# Patient Record
Sex: MALE | Race: OTHER | Employment: FULL TIME | URBAN - METROPOLITAN AREA
[De-identification: names, ages, dates, MRNs, and addresses within clinical notes are randomized per-mention and may not be internally consistent; named-entity substitution may affect disease eponyms.]

---

## 2018-03-20 ENCOUNTER — HOSPITAL ENCOUNTER (OUTPATIENT)
Dept: CT IMAGING | Age: 33
Discharge: HOME | End: 2018-03-22
Payer: COMMERCIAL

## 2018-03-20 DIAGNOSIS — R31.9 HEMATURIA, UNSPECIFIED TYPE: ICD-10-CM

## 2018-03-20 PROCEDURE — 74176 CT ABD & PELVIS W/O CONTRAST: CPT

## 2018-04-03 ENCOUNTER — OFFICE VISIT (OUTPATIENT)
Dept: FAMILY MEDICINE CLINIC | Age: 33
End: 2018-04-03
Payer: COMMERCIAL

## 2018-04-03 VITALS
HEART RATE: 68 BPM | WEIGHT: 131.2 LBS | HEIGHT: 69 IN | SYSTOLIC BLOOD PRESSURE: 107 MMHG | TEMPERATURE: 99.3 F | BODY MASS INDEX: 19.43 KG/M2 | DIASTOLIC BLOOD PRESSURE: 67 MMHG

## 2018-04-03 DIAGNOSIS — K40.90 LEFT INGUINAL HERNIA: ICD-10-CM

## 2018-04-03 DIAGNOSIS — R31.0 GROSS HEMATURIA: Primary | ICD-10-CM

## 2018-04-03 PROCEDURE — 81001 URINALYSIS AUTO W/SCOPE: CPT | Performed by: STUDENT IN AN ORGANIZED HEALTH CARE EDUCATION/TRAINING PROGRAM

## 2018-04-03 PROCEDURE — 4004F PT TOBACCO SCREEN RCVD TLK: CPT | Performed by: STUDENT IN AN ORGANIZED HEALTH CARE EDUCATION/TRAINING PROGRAM

## 2018-04-03 PROCEDURE — G8420 CALC BMI NORM PARAMETERS: HCPCS | Performed by: STUDENT IN AN ORGANIZED HEALTH CARE EDUCATION/TRAINING PROGRAM

## 2018-04-03 PROCEDURE — 99203 OFFICE O/P NEW LOW 30 MIN: CPT | Performed by: STUDENT IN AN ORGANIZED HEALTH CARE EDUCATION/TRAINING PROGRAM

## 2018-04-03 PROCEDURE — G8427 DOCREV CUR MEDS BY ELIG CLIN: HCPCS | Performed by: STUDENT IN AN ORGANIZED HEALTH CARE EDUCATION/TRAINING PROGRAM

## 2018-04-03 PROCEDURE — 99213 OFFICE O/P EST LOW 20 MIN: CPT

## 2018-04-03 ASSESSMENT — ENCOUNTER SYMPTOMS
WHEEZING: 0
ABDOMINAL PAIN: 0
VOMITING: 0
CONSTIPATION: 0
DIARRHEA: 0
NAUSEA: 0
SHORTNESS OF BREATH: 0
COUGH: 0

## 2018-04-03 ASSESSMENT — PATIENT HEALTH QUESTIONNAIRE - PHQ9
2. FEELING DOWN, DEPRESSED OR HOPELESS: 0
1. LITTLE INTEREST OR PLEASURE IN DOING THINGS: 0
SUM OF ALL RESPONSES TO PHQ QUESTIONS 1-9: 0
SUM OF ALL RESPONSES TO PHQ9 QUESTIONS 1 & 2: 0

## 2018-04-04 ENCOUNTER — HOSPITAL ENCOUNTER (OUTPATIENT)
Age: 33
Setting detail: SPECIMEN
Discharge: HOME | End: 2018-04-04
Payer: COMMERCIAL

## 2018-04-04 ENCOUNTER — OFFICE VISIT (OUTPATIENT)
Dept: SURGERY | Age: 33
End: 2018-04-04
Payer: COMMERCIAL

## 2018-04-04 VITALS
WEIGHT: 132.2 LBS | BODY MASS INDEX: 19.58 KG/M2 | SYSTOLIC BLOOD PRESSURE: 126 MMHG | TEMPERATURE: 98.1 F | DIASTOLIC BLOOD PRESSURE: 83 MMHG | HEART RATE: 82 BPM | HEIGHT: 69 IN

## 2018-04-04 DIAGNOSIS — K40.90 LEFT INGUINAL HERNIA: Primary | ICD-10-CM

## 2018-04-04 LAB
-: ABNORMAL
AMORPHOUS: ABNORMAL
BACTERIA: ABNORMAL
BILIRUBIN URINE: NEGATIVE
CASTS UA: ABNORMAL /LPF (ref 0–2)
COLOR: YELLOW
COMMENT UA: ABNORMAL
CRYSTALS, UA: ABNORMAL /HPF
EPITHELIAL CELLS UA: ABNORMAL /HPF (ref 0–5)
GLUCOSE URINE: NEGATIVE
KETONES, URINE: NEGATIVE
LEUKOCYTE ESTERASE, URINE: ABNORMAL
MUCUS: ABNORMAL
NITRITE, URINE: NEGATIVE
OTHER OBSERVATIONS UA: ABNORMAL
PH UA: 5 (ref 5–8)
PROTEIN UA: NEGATIVE
RBC UA: ABNORMAL /HPF (ref 0–2)
RENAL EPITHELIAL, UA: ABNORMAL /HPF
SPECIFIC GRAVITY UA: 1.03 (ref 1–1.03)
TRICHOMONAS: ABNORMAL
TURBIDITY: ABNORMAL
URINE HGB: ABNORMAL
UROBILINOGEN, URINE: NORMAL
WBC UA: ABNORMAL /HPF (ref 0–5)
YEAST: ABNORMAL

## 2018-04-04 PROCEDURE — G8427 DOCREV CUR MEDS BY ELIG CLIN: HCPCS | Performed by: SURGERY

## 2018-04-04 PROCEDURE — G8420 CALC BMI NORM PARAMETERS: HCPCS | Performed by: SURGERY

## 2018-04-04 PROCEDURE — 4004F PT TOBACCO SCREEN RCVD TLK: CPT | Performed by: SURGERY

## 2018-04-04 PROCEDURE — 99203 OFFICE O/P NEW LOW 30 MIN: CPT | Performed by: SURGERY

## 2018-04-05 ENCOUNTER — TELEPHONE (OUTPATIENT)
Dept: FAMILY MEDICINE CLINIC | Age: 33
End: 2018-04-05

## 2018-04-12 ENCOUNTER — ANESTHESIA EVENT (OUTPATIENT)
Dept: OPERATING ROOM | Age: 33
End: 2018-04-12
Payer: COMMERCIAL

## 2018-04-13 ENCOUNTER — ANESTHESIA (OUTPATIENT)
Dept: OPERATING ROOM | Age: 33
End: 2018-04-13
Payer: COMMERCIAL

## 2018-04-13 ENCOUNTER — HOSPITAL ENCOUNTER (OUTPATIENT)
Age: 33
Setting detail: OUTPATIENT SURGERY
Discharge: HOME | End: 2018-04-13
Attending: SURGERY | Admitting: SURGERY
Payer: COMMERCIAL

## 2018-04-13 VITALS
SYSTOLIC BLOOD PRESSURE: 126 MMHG | OXYGEN SATURATION: 99 % | TEMPERATURE: 97.3 F | RESPIRATION RATE: 17 BRPM | WEIGHT: 141 LBS | DIASTOLIC BLOOD PRESSURE: 88 MMHG | HEART RATE: 67 BPM | BODY MASS INDEX: 20.88 KG/M2 | HEIGHT: 69 IN

## 2018-04-13 VITALS — OXYGEN SATURATION: 99 % | TEMPERATURE: 95.6 F | SYSTOLIC BLOOD PRESSURE: 109 MMHG | DIASTOLIC BLOOD PRESSURE: 79 MMHG

## 2018-04-13 DIAGNOSIS — K40.90 LEFT INGUINAL HERNIA: Primary | ICD-10-CM

## 2018-04-13 PROCEDURE — 3700000000 HC ANESTHESIA ATTENDED CARE: Performed by: SURGERY

## 2018-04-13 PROCEDURE — 6360000002 HC RX W HCPCS: Performed by: NURSE ANESTHETIST, CERTIFIED REGISTERED

## 2018-04-13 PROCEDURE — C1760 CLOSURE DEV, VASC: HCPCS | Performed by: SURGERY

## 2018-04-13 PROCEDURE — S2900 ROBOTIC SURGICAL SYSTEM: HCPCS | Performed by: SURGERY

## 2018-04-13 PROCEDURE — 6360000002 HC RX W HCPCS: Performed by: SURGERY

## 2018-04-13 PROCEDURE — 3700000001 HC ADD 15 MINUTES (ANESTHESIA): Performed by: SURGERY

## 2018-04-13 PROCEDURE — 2500000003 HC RX 250 WO HCPCS: Performed by: NURSE ANESTHETIST, CERTIFIED REGISTERED

## 2018-04-13 PROCEDURE — 2500000003 HC RX 250 WO HCPCS: Performed by: ANESTHESIOLOGY

## 2018-04-13 PROCEDURE — 6360000002 HC RX W HCPCS

## 2018-04-13 PROCEDURE — 3600000019 HC SURGERY ROBOT ADDTL 15MIN: Performed by: SURGERY

## 2018-04-13 PROCEDURE — 64488 TAP BLOCK BI INJECTION: CPT | Performed by: ANESTHESIOLOGY

## 2018-04-13 PROCEDURE — 2580000003 HC RX 258: Performed by: SURGERY

## 2018-04-13 PROCEDURE — 7100000011 HC PHASE II RECOVERY - ADDTL 15 MIN: Performed by: SURGERY

## 2018-04-13 PROCEDURE — S0028 INJECTION, FAMOTIDINE, 20 MG: HCPCS | Performed by: ANESTHESIOLOGY

## 2018-04-13 PROCEDURE — C1781 MESH (IMPLANTABLE): HCPCS | Performed by: SURGERY

## 2018-04-13 PROCEDURE — 6370000000 HC RX 637 (ALT 250 FOR IP): Performed by: ANESTHESIOLOGY

## 2018-04-13 PROCEDURE — 7100000010 HC PHASE II RECOVERY - FIRST 15 MIN: Performed by: SURGERY

## 2018-04-13 PROCEDURE — 3600000009 HC SURGERY ROBOT BASE: Performed by: SURGERY

## 2018-04-13 PROCEDURE — 7100000001 HC PACU RECOVERY - ADDTL 15 MIN: Performed by: SURGERY

## 2018-04-13 PROCEDURE — 6360000002 HC RX W HCPCS: Performed by: ANESTHESIOLOGY

## 2018-04-13 PROCEDURE — 7100000000 HC PACU RECOVERY - FIRST 15 MIN: Performed by: SURGERY

## 2018-04-13 DEVICE — MESH SURG W3.5XL6IN POLY SELF FIXATING RECT W/ RESRB PLA: Type: IMPLANTABLE DEVICE | Site: PELVIS | Status: FUNCTIONAL

## 2018-04-13 RX ORDER — LIDOCAINE HYDROCHLORIDE 10 MG/ML
INJECTION, SOLUTION EPIDURAL; INFILTRATION; INTRACAUDAL; PERINEURAL PRN
Status: DISCONTINUED | OUTPATIENT
Start: 2018-04-13 | End: 2018-04-13 | Stop reason: SDUPTHER

## 2018-04-13 RX ORDER — OXYCODONE HYDROCHLORIDE AND ACETAMINOPHEN 5; 325 MG/1; MG/1
TABLET ORAL
Qty: 28 TABLET | Refills: 0 | Status: SHIPPED | OUTPATIENT
Start: 2018-04-13 | End: 2018-04-20

## 2018-04-13 RX ORDER — SODIUM CHLORIDE 0.9 % (FLUSH) 0.9 %
10 SYRINGE (ML) INJECTION EVERY 12 HOURS SCHEDULED
Status: DISCONTINUED | OUTPATIENT
Start: 2018-04-13 | End: 2018-04-13 | Stop reason: HOSPADM

## 2018-04-13 RX ORDER — FENTANYL CITRATE 50 UG/ML
INJECTION, SOLUTION INTRAMUSCULAR; INTRAVENOUS
Status: COMPLETED
Start: 2018-04-13 | End: 2018-04-13

## 2018-04-13 RX ORDER — FENTANYL CITRATE 50 UG/ML
25 INJECTION, SOLUTION INTRAMUSCULAR; INTRAVENOUS EVERY 5 MIN PRN
Status: DISCONTINUED | OUTPATIENT
Start: 2018-04-13 | End: 2018-04-13 | Stop reason: HOSPADM

## 2018-04-13 RX ORDER — ROCURONIUM BROMIDE 10 MG/ML
INJECTION, SOLUTION INTRAVENOUS PRN
Status: DISCONTINUED | OUTPATIENT
Start: 2018-04-13 | End: 2018-04-13 | Stop reason: SDUPTHER

## 2018-04-13 RX ORDER — MIDAZOLAM HYDROCHLORIDE 1 MG/ML
INJECTION INTRAMUSCULAR; INTRAVENOUS
Status: COMPLETED
Start: 2018-04-13 | End: 2018-04-13

## 2018-04-13 RX ORDER — LIDOCAINE HYDROCHLORIDE 10 MG/ML
1 INJECTION, SOLUTION EPIDURAL; INFILTRATION; INTRACAUDAL; PERINEURAL
Status: DISCONTINUED | OUTPATIENT
Start: 2018-04-13 | End: 2018-04-13 | Stop reason: HOSPADM

## 2018-04-13 RX ORDER — DOCUSATE SODIUM 100 MG/1
100 CAPSULE, LIQUID FILLED ORAL 2 TIMES DAILY PRN
Qty: 30 CAPSULE | Refills: 0 | Status: SHIPPED | OUTPATIENT
Start: 2018-04-13 | End: 2020-09-01

## 2018-04-13 RX ORDER — SODIUM CHLORIDE, SODIUM LACTATE, POTASSIUM CHLORIDE, CALCIUM CHLORIDE 600; 310; 30; 20 MG/100ML; MG/100ML; MG/100ML; MG/100ML
INJECTION, SOLUTION INTRAVENOUS CONTINUOUS
Status: DISCONTINUED | OUTPATIENT
Start: 2018-04-13 | End: 2018-04-13 | Stop reason: HOSPADM

## 2018-04-13 RX ORDER — SODIUM CHLORIDE, SODIUM LACTATE, POTASSIUM CHLORIDE, CALCIUM CHLORIDE 600; 310; 30; 20 MG/100ML; MG/100ML; MG/100ML; MG/100ML
INJECTION, SOLUTION INTRAVENOUS CONTINUOUS
Status: DISCONTINUED | OUTPATIENT
Start: 2018-04-13 | End: 2018-04-13

## 2018-04-13 RX ORDER — FENTANYL CITRATE 50 UG/ML
100 INJECTION, SOLUTION INTRAMUSCULAR; INTRAVENOUS ONCE
Status: COMPLETED | OUTPATIENT
Start: 2018-04-13 | End: 2018-04-13

## 2018-04-13 RX ORDER — PROPOFOL 10 MG/ML
INJECTION, EMULSION INTRAVENOUS PRN
Status: DISCONTINUED | OUTPATIENT
Start: 2018-04-13 | End: 2018-04-13 | Stop reason: SDUPTHER

## 2018-04-13 RX ORDER — ONDANSETRON 2 MG/ML
INJECTION INTRAMUSCULAR; INTRAVENOUS PRN
Status: DISCONTINUED | OUTPATIENT
Start: 2018-04-13 | End: 2018-04-13 | Stop reason: SDUPTHER

## 2018-04-13 RX ORDER — ONDANSETRON 2 MG/ML
4 INJECTION INTRAMUSCULAR; INTRAVENOUS ONCE
Status: DISCONTINUED | OUTPATIENT
Start: 2018-04-13 | End: 2018-04-13 | Stop reason: HOSPADM

## 2018-04-13 RX ORDER — ONDANSETRON 2 MG/ML
4 INJECTION INTRAMUSCULAR; INTRAVENOUS
Status: DISCONTINUED | OUTPATIENT
Start: 2018-04-13 | End: 2018-04-13 | Stop reason: HOSPADM

## 2018-04-13 RX ORDER — BUPIVACAINE HYDROCHLORIDE 5 MG/ML
40 INJECTION, SOLUTION EPIDURAL; INTRACAUDAL ONCE
Status: COMPLETED | OUTPATIENT
Start: 2018-04-13 | End: 2018-04-13

## 2018-04-13 RX ORDER — SODIUM CHLORIDE 0.9 % (FLUSH) 0.9 %
10 SYRINGE (ML) INJECTION PRN
Status: DISCONTINUED | OUTPATIENT
Start: 2018-04-13 | End: 2018-04-13 | Stop reason: HOSPADM

## 2018-04-13 RX ORDER — DEXAMETHASONE SODIUM PHOSPHATE 10 MG/ML
INJECTION INTRAMUSCULAR; INTRAVENOUS PRN
Status: DISCONTINUED | OUTPATIENT
Start: 2018-04-13 | End: 2018-04-13 | Stop reason: SDUPTHER

## 2018-04-13 RX ORDER — MIDAZOLAM HYDROCHLORIDE 1 MG/ML
2 INJECTION INTRAMUSCULAR; INTRAVENOUS ONCE
Status: COMPLETED | OUTPATIENT
Start: 2018-04-13 | End: 2018-04-13

## 2018-04-13 RX ORDER — OXYCODONE HYDROCHLORIDE AND ACETAMINOPHEN 5; 325 MG/1; MG/1
1 TABLET ORAL ONCE
Status: COMPLETED | OUTPATIENT
Start: 2018-04-13 | End: 2018-04-13

## 2018-04-13 RX ADMIN — LIDOCAINE HYDROCHLORIDE 50 MG: 10 INJECTION, SOLUTION EPIDURAL; INFILTRATION; INTRACAUDAL; PERINEURAL at 13:37

## 2018-04-13 RX ADMIN — PROPOFOL 150 MG: 10 INJECTION, EMULSION INTRAVENOUS at 13:37

## 2018-04-13 RX ADMIN — Medication 2 G: at 13:45

## 2018-04-13 RX ADMIN — BUPIVACAINE HYDROCHLORIDE 200 MG: 5 INJECTION, SOLUTION EPIDURAL; INTRACAUDAL; PERINEURAL at 13:16

## 2018-04-13 RX ADMIN — OXYCODONE HYDROCHLORIDE AND ACETAMINOPHEN 1 TABLET: 5; 325 TABLET ORAL at 15:50

## 2018-04-13 RX ADMIN — DEXAMETHASONE SODIUM PHOSPHATE 10 MG: 10 INJECTION INTRAMUSCULAR; INTRAVENOUS at 13:45

## 2018-04-13 RX ADMIN — MIDAZOLAM HYDROCHLORIDE 2 MG: 1 INJECTION, SOLUTION INTRAMUSCULAR; INTRAVENOUS at 13:07

## 2018-04-13 RX ADMIN — ONDANSETRON 4 MG: 2 INJECTION INTRAMUSCULAR; INTRAVENOUS at 14:35

## 2018-04-13 RX ADMIN — ROCURONIUM BROMIDE 30 MG: 10 INJECTION INTRAVENOUS at 13:37

## 2018-04-13 RX ADMIN — FENTANYL CITRATE 100 MCG: 50 INJECTION, SOLUTION INTRAMUSCULAR; INTRAVENOUS at 13:07

## 2018-04-13 RX ADMIN — MIDAZOLAM HYDROCHLORIDE 2 MG: 1 INJECTION INTRAMUSCULAR; INTRAVENOUS at 13:07

## 2018-04-13 RX ADMIN — SODIUM CHLORIDE, POTASSIUM CHLORIDE, SODIUM LACTATE AND CALCIUM CHLORIDE: 600; 310; 30; 20 INJECTION, SOLUTION INTRAVENOUS at 13:02

## 2018-04-13 RX ADMIN — FENTANYL CITRATE 25 MCG: 50 INJECTION, SOLUTION INTRAMUSCULAR; INTRAVENOUS at 15:30

## 2018-04-13 RX ADMIN — FENTANYL CITRATE 100 MCG: 50 INJECTION INTRAMUSCULAR; INTRAVENOUS at 13:07

## 2018-04-13 RX ADMIN — FAMOTIDINE 20 MG: 10 INJECTION, SOLUTION INTRAVENOUS at 13:20

## 2018-04-13 RX ADMIN — FENTANYL CITRATE 25 MCG: 50 INJECTION, SOLUTION INTRAMUSCULAR; INTRAVENOUS at 15:25

## 2018-04-13 ASSESSMENT — PULMONARY FUNCTION TESTS
PIF_VALUE: 21
PIF_VALUE: 21
PIF_VALUE: 15
PIF_VALUE: 21
PIF_VALUE: 1
PIF_VALUE: 18
PIF_VALUE: 18
PIF_VALUE: 1
PIF_VALUE: 5
PIF_VALUE: 18
PIF_VALUE: 3
PIF_VALUE: 1
PIF_VALUE: 4
PIF_VALUE: 0
PIF_VALUE: 27
PIF_VALUE: 14
PIF_VALUE: 18
PIF_VALUE: 19
PIF_VALUE: 21
PIF_VALUE: 20
PIF_VALUE: 15
PIF_VALUE: 21
PIF_VALUE: 3
PIF_VALUE: 14
PIF_VALUE: 21
PIF_VALUE: 12
PIF_VALUE: 1
PIF_VALUE: 1
PIF_VALUE: 14
PIF_VALUE: 14
PIF_VALUE: 21
PIF_VALUE: 21
PIF_VALUE: 3
PIF_VALUE: 15
PIF_VALUE: 21
PIF_VALUE: 14
PIF_VALUE: 18
PIF_VALUE: 14
PIF_VALUE: 15
PIF_VALUE: 22
PIF_VALUE: 11
PIF_VALUE: 16
PIF_VALUE: 3
PIF_VALUE: 14
PIF_VALUE: 3
PIF_VALUE: 21
PIF_VALUE: 14
PIF_VALUE: 14
PIF_VALUE: 13
PIF_VALUE: 21
PIF_VALUE: 21
PIF_VALUE: 14
PIF_VALUE: 1
PIF_VALUE: 4
PIF_VALUE: 21
PIF_VALUE: 20
PIF_VALUE: 21
PIF_VALUE: 21
PIF_VALUE: 1
PIF_VALUE: 2
PIF_VALUE: 20
PIF_VALUE: 22
PIF_VALUE: 21
PIF_VALUE: 4
PIF_VALUE: 3
PIF_VALUE: 15
PIF_VALUE: 21
PIF_VALUE: 15
PIF_VALUE: 13
PIF_VALUE: 1
PIF_VALUE: 0
PIF_VALUE: 21
PIF_VALUE: 22
PIF_VALUE: 1
PIF_VALUE: 21
PIF_VALUE: 21
PIF_VALUE: 1
PIF_VALUE: 21
PIF_VALUE: 18
PIF_VALUE: 14
PIF_VALUE: 21
PIF_VALUE: 19
PIF_VALUE: 1
PIF_VALUE: 21
PIF_VALUE: 14

## 2018-04-13 ASSESSMENT — PAIN SCALES - WONG BAKER
WONGBAKER_NUMERICALRESPONSE: 0

## 2018-04-13 ASSESSMENT — PAIN DESCRIPTION - LOCATION
LOCATION: ABDOMEN

## 2018-04-13 ASSESSMENT — PAIN SCALES - GENERAL
PAINLEVEL_OUTOF10: 5
PAINLEVEL_OUTOF10: 6
PAINLEVEL_OUTOF10: 5
PAINLEVEL_OUTOF10: 6
PAINLEVEL_OUTOF10: 5
PAINLEVEL_OUTOF10: 6
PAINLEVEL_OUTOF10: 3
PAINLEVEL_OUTOF10: 7
PAINLEVEL_OUTOF10: 5
PAINLEVEL_OUTOF10: 3

## 2018-04-13 ASSESSMENT — PAIN DESCRIPTION - PAIN TYPE
TYPE: SURGICAL PAIN

## 2018-04-13 ASSESSMENT — PAIN - FUNCTIONAL ASSESSMENT: PAIN_FUNCTIONAL_ASSESSMENT: 0-10

## 2018-05-02 ENCOUNTER — OFFICE VISIT (OUTPATIENT)
Dept: SURGERY | Age: 33
End: 2018-05-02
Payer: COMMERCIAL

## 2018-05-02 VITALS
HEART RATE: 65 BPM | WEIGHT: 128.8 LBS | DIASTOLIC BLOOD PRESSURE: 83 MMHG | BODY MASS INDEX: 19.02 KG/M2 | SYSTOLIC BLOOD PRESSURE: 115 MMHG | TEMPERATURE: 97.8 F

## 2018-05-02 DIAGNOSIS — K40.90 LEFT INGUINAL HERNIA: Primary | ICD-10-CM

## 2018-05-02 PROCEDURE — 99213 OFFICE O/P EST LOW 20 MIN: CPT | Performed by: STUDENT IN AN ORGANIZED HEALTH CARE EDUCATION/TRAINING PROGRAM

## 2018-05-02 PROCEDURE — 99024 POSTOP FOLLOW-UP VISIT: CPT | Performed by: SURGERY

## 2019-06-24 ENCOUNTER — HOSPITAL ENCOUNTER (EMERGENCY)
Age: 34
Discharge: HOME | End: 2019-06-24
Attending: EMERGENCY MEDICINE

## 2019-06-24 VITALS
SYSTOLIC BLOOD PRESSURE: 123 MMHG | TEMPERATURE: 98.4 F | DIASTOLIC BLOOD PRESSURE: 83 MMHG | RESPIRATION RATE: 16 BRPM | HEART RATE: 90 BPM | OXYGEN SATURATION: 98 %

## 2019-06-24 DIAGNOSIS — N34.2 URETHRITIS: Primary | ICD-10-CM

## 2019-06-24 LAB
-: NORMAL
AMORPHOUS: NORMAL
BACTERIA: NORMAL
BILIRUBIN URINE: NEGATIVE
C. TRACHOMATIS DNA ,URINE: ABNORMAL
CASTS UA: NORMAL /LPF (ref 0–8)
COLOR: YELLOW
COMMENT UA: ABNORMAL
CRYSTALS, UA: NORMAL /HPF
DIRECT EXAM: NORMAL
EPITHELIAL CELLS UA: NORMAL /HPF (ref 0–5)
GLUCOSE URINE: NEGATIVE
HIV AG/AB: NONREACTIVE
KETONES, URINE: NEGATIVE
LEUKOCYTE ESTERASE, URINE: ABNORMAL
Lab: NORMAL
MUCUS: NORMAL
N. GONORRHOEAE DNA, URINE: ABNORMAL
NITRITE, URINE: NEGATIVE
OTHER OBSERVATIONS UA: NORMAL
PH UA: 8 (ref 5–8)
PROTEIN UA: ABNORMAL
RBC UA: NORMAL /HPF (ref 0–4)
RENAL EPITHELIAL, UA: NORMAL /HPF
SPECIFIC GRAVITY UA: 1.03 (ref 1–1.03)
SPECIMEN DESCRIPTION: ABNORMAL
SPECIMEN DESCRIPTION: NORMAL
T. PALLIDUM, IGG: NONREACTIVE
TRICHOMONAS: NORMAL
TURBIDITY: ABNORMAL
URINE HGB: NEGATIVE
UROBILINOGEN, URINE: NORMAL
WBC UA: NORMAL /HPF (ref 0–5)
YEAST: NORMAL

## 2019-06-24 PROCEDURE — 87086 URINE CULTURE/COLONY COUNT: CPT

## 2019-06-24 PROCEDURE — 6370000000 HC RX 637 (ALT 250 FOR IP): Performed by: STUDENT IN AN ORGANIZED HEALTH CARE EDUCATION/TRAINING PROGRAM

## 2019-06-24 PROCEDURE — 86780 TREPONEMA PALLIDUM: CPT

## 2019-06-24 PROCEDURE — 96372 THER/PROPH/DIAG INJ SC/IM: CPT

## 2019-06-24 PROCEDURE — 99283 EMERGENCY DEPT VISIT LOW MDM: CPT

## 2019-06-24 PROCEDURE — 87591 N.GONORRHOEAE DNA AMP PROB: CPT

## 2019-06-24 PROCEDURE — 87210 SMEAR WET MOUNT SALINE/INK: CPT

## 2019-06-24 PROCEDURE — 81001 URINALYSIS AUTO W/SCOPE: CPT

## 2019-06-24 PROCEDURE — 6360000002 HC RX W HCPCS: Performed by: STUDENT IN AN ORGANIZED HEALTH CARE EDUCATION/TRAINING PROGRAM

## 2019-06-24 PROCEDURE — 87491 CHLMYD TRACH DNA AMP PROBE: CPT

## 2019-06-24 PROCEDURE — 87389 HIV-1 AG W/HIV-1&-2 AB AG IA: CPT

## 2019-06-24 RX ORDER — AZITHROMYCIN 250 MG/1
1000 TABLET, FILM COATED ORAL ONCE
Status: COMPLETED | OUTPATIENT
Start: 2019-06-24 | End: 2019-06-24

## 2019-06-24 RX ORDER — CEPHALEXIN 500 MG/1
500 CAPSULE ORAL 4 TIMES DAILY
Qty: 28 CAPSULE | Refills: 0 | Status: SHIPPED | OUTPATIENT
Start: 2019-06-24 | End: 2019-07-01

## 2019-06-24 RX ORDER — CEFTRIAXONE SODIUM 250 MG/1
250 INJECTION, POWDER, FOR SOLUTION INTRAMUSCULAR; INTRAVENOUS ONCE
Status: COMPLETED | OUTPATIENT
Start: 2019-06-24 | End: 2019-06-24

## 2019-06-24 RX ADMIN — AZITHROMYCIN 1000 MG: 250 TABLET, FILM COATED ORAL at 05:40

## 2019-06-24 RX ADMIN — CEFTRIAXONE SODIUM 250 MG: 250 INJECTION, POWDER, FOR SOLUTION INTRAMUSCULAR; INTRAVENOUS at 05:40

## 2019-06-24 ASSESSMENT — ENCOUNTER SYMPTOMS
ABDOMINAL PAIN: 0
SORE THROAT: 0
SHORTNESS OF BREATH: 0
NAUSEA: 0
VOMITING: 0

## 2019-06-24 NOTE — ED NOTES
Pt laying on cart with gown over his face. Pt not speaking to RN. Respirations noted.       Giorgi Cedeño RN  06/24/19 6557

## 2019-06-24 NOTE — ED PROVIDER NOTES
[facility]  ED  Emergency Department  Emergency Medicine Resident Sign-out     Care of Holly Martin was assumed from Dr. Claudette Yu and is being seen for Hematuria (x 1 week)  . The patient's initial evaluation and plan have been discussed with the prior provider who initially evaluated the patient. EMERGENCY DEPARTMENT COURSE / MEDICAL DECISION MAKING:       MEDICATIONS GIVEN:  Orders Placed This Encounter   Medications    cefTRIAXone (ROCEPHIN) injection 250 mg    azithromycin (ZITHROMAX) tablet 1,000 mg       LABS / RADIOLOGY:     Labs Reviewed   URINE RT REFLEX TO CULTURE - Abnormal; Notable for the following components:       Result Value    Turbidity UA CLOUDY (*)     Protein, UA NEGATIVE  Verified by sulfosalicylic acid test. (*)     Leukocyte Esterase, Urine MODERATE (*)     All other components within normal limits   WET PREP, GENITAL   C.TRACHOMATIS N.GONORRHOEAE DNA, URINE   URINE CULTURE   T. PALLIDUM AB   MICROSCOPIC URINALYSIS   HIV SCREEN   PROTEIN, URINE   PROTEIN, URINE       No results found. RECENT VITALS:     Temp: 98.4 °F (36.9 °C),  Pulse: 90, Resp: 16, BP: 123/83, SpO2: 98 %    This patient is a 26 y.o. Male with std exposure, received azithro/rocephin. Awaiting syphilis & HIV. Patient with elevated white blood cell count, positive leukoesterase on urinalysis. Provided with Keflex prescription for 7 days for possible superimposed UTI. Patient also treated with Rocephin and azithromycin prior to handoff. HIV and syphilis negative. OUTSTANDING TASKS / RECOMMENDATIONS:    1. D/C home     FINAL IMPRESSION:     1.  Urethritis        DISPOSITION:         DISPOSITION:  [x]  Discharge   []  Transfer -    []  Admission -     []  Against Medical Advice   []  Eloped   FOLLOW-UP: Saundra Puente MD  [address] [place] [id number]    Schedule an appointment as soon as possible for a visit       [facility] ED  [address] [place] [address] [address] [id number]  [phone]    As needed, If symptoms worsen     DISCHARGE MEDICATIONS: New Prescriptions    No medications on file          Walter Bell MD  Emergency Medicine Resident  Janneth Bell MD  06/24/19 5293

## 2019-06-24 NOTE — ED PROVIDER NOTES
[address] ED     Emergency Department     Faculty Attestation    I performed a history and physical examination of the patient and discussed management with the resident. I reviewed the residents note and agree with the documented findings and plan of care. Any areas of disagreement are noted on the chart. I was personally present for the key portions of any procedures. I have documented in the chart those procedures where I was not present during the key portions. I have reviewed the emergency nurses triage note. I agree with the chief complaint, past medical history, past surgical history, allergies, medications, social and family history as documented unless otherwise noted below. For Physician Assistant/ Nurse Practitioner cases/documentation I have personally evaluated this patient and have completed at least one if not all key elements of the E/M (history, physical exam, and MDM). Additional findings are as noted. STD concerns, multiple current partners with unprotected sex.   Will check UA, labs, treat regardless      Critical Care     none    Cain Gallego MD, Luis Alberto Dailey  Attending Emergency  Physician             Cain Gallego MD  06/24/19 6480

## 2019-06-25 ENCOUNTER — TELEPHONE (OUTPATIENT)
Dept: PHARMACY | Age: 34
End: 2019-06-25

## 2019-06-25 LAB
CULTURE: NORMAL
Lab: NORMAL
SPECIMEN DESCRIPTION: NORMAL

## 2019-06-27 ENCOUNTER — TELEPHONE (OUTPATIENT)
Dept: PHARMACY | Age: 34
End: 2019-06-27

## 2019-08-10 ENCOUNTER — APPOINTMENT (OUTPATIENT)
Dept: CT IMAGING | Age: 34
DRG: 918 | End: 2019-08-10

## 2019-08-10 ENCOUNTER — APPOINTMENT (OUTPATIENT)
Dept: GENERAL RADIOLOGY | Age: 34
DRG: 918 | End: 2019-08-10

## 2019-08-10 ENCOUNTER — HOSPITAL ENCOUNTER (INPATIENT)
Age: 34
LOS: 1 days | Discharge: HOME | DRG: 918 | End: 2019-08-11
Attending: EMERGENCY MEDICINE | Admitting: INTERNAL MEDICINE

## 2019-08-10 DIAGNOSIS — R06.89 RESPIRATORY DEPRESSION: Primary | ICD-10-CM

## 2019-08-10 LAB
-: NORMAL
ABSOLUTE EOS #: 0.2 K/UL (ref 0–0.44)
ABSOLUTE IMMATURE GRANULOCYTE: <0.03 K/UL (ref 0–0.3)
ABSOLUTE LYMPH #: 2.53 K/UL (ref 1.1–3.7)
ABSOLUTE MONO #: 0.91 K/UL (ref 0.1–1.2)
ACETAMINOPHEN LEVEL: <5 UG/ML (ref 10–30)
ALBUMIN SERPL-MCNC: 4 G/DL (ref 3.5–5.2)
ALBUMIN/GLOBULIN RATIO: 1.5 (ref 1–2.5)
ALLEN TEST: ABNORMAL
ALP BLD-CCNC: 56 U/L (ref 40–129)
ALT SERPL-CCNC: 19 U/L (ref 5–41)
AMMONIA: 75 UMOL/L (ref 16–60)
AMORPHOUS: NORMAL
AMPHETAMINE SCREEN URINE: NEGATIVE
ANION GAP SERPL CALCULATED.3IONS-SCNC: 10 MMOL/L (ref 9–17)
ANION GAP: 8 MMOL/L (ref 7–16)
AST SERPL-CCNC: 26 U/L
BACTERIA: NORMAL
BARBITURATE SCREEN URINE: NEGATIVE
BASOPHILS # BLD: 0 % (ref 0–2)
BASOPHILS ABSOLUTE: 0.03 K/UL (ref 0–0.2)
BENZODIAZEPINE SCREEN, URINE: POSITIVE
BILIRUB SERPL-MCNC: 0.89 MG/DL (ref 0.3–1.2)
BILIRUBIN DIRECT: 0.17 MG/DL
BILIRUBIN URINE: NEGATIVE
BILIRUBIN, INDIRECT: 0.72 MG/DL (ref 0–1)
BUN BLDV-MCNC: 14 MG/DL (ref 6–20)
BUN/CREAT BLD: NORMAL (ref 9–20)
BUPRENORPHINE URINE: ABNORMAL
CALCIUM SERPL-MCNC: 8.8 MG/DL (ref 8.6–10.4)
CANNABINOID SCREEN URINE: POSITIVE
CASTS UA: NORMAL /LPF (ref 0–2)
CHLORIDE BLD-SCNC: 103 MMOL/L (ref 98–107)
CO2: 25 MMOL/L (ref 20–31)
COCAINE METABOLITE, URINE: POSITIVE
COLOR: YELLOW
COMMENT UA: ABNORMAL
CREAT SERPL-MCNC: 1.09 MG/DL (ref 0.7–1.2)
CRYSTALS, UA: NORMAL /HPF
DIFFERENTIAL TYPE: NORMAL
EOSINOPHILS RELATIVE PERCENT: 2 % (ref 1–4)
EPITHELIAL CELLS UA: NORMAL /HPF
ETHANOL PERCENT: <0.01 %
ETHANOL: <10 MG/DL
FIO2: ABNORMAL
GFR AFRICAN AMERICAN: >60 ML/MIN
GFR NON-AFRICAN AMERICAN: >60 ML/MIN
GFR NON-AFRICAN AMERICAN: >60 ML/MIN
GFR SERPL CREATININE-BSD FRML MDRD: >60 ML/MIN
GFR SERPL CREATININE-BSD FRML MDRD: NORMAL ML/MIN/{1.73_M2}
GLOBULIN: NORMAL G/DL (ref 1.5–3.8)
GLUCOSE BLD-MCNC: 78 MG/DL (ref 75–110)
GLUCOSE BLD-MCNC: 88 MG/DL (ref 70–99)
GLUCOSE BLD-MCNC: 96 MG/DL (ref 74–100)
GLUCOSE URINE: NEGATIVE
HCO3 VENOUS: 28.3 MMOL/L (ref 22–29)
HCT VFR BLD CALC: 43.2 % (ref 40.7–50.3)
HEMOGLOBIN: 13.5 G/DL (ref 13–17)
IMMATURE GRANULOCYTES: 0 %
KETONES, URINE: NEGATIVE
LEUKOCYTE ESTERASE, URINE: NEGATIVE
LYMPHOCYTES # BLD: 25 % (ref 24–43)
MCH RBC QN AUTO: 30.5 PG (ref 25.2–33.5)
MCHC RBC AUTO-ENTMCNC: 31.3 G/DL (ref 28.4–34.8)
MCV RBC AUTO: 97.7 FL (ref 82.6–102.9)
MDMA URINE: ABNORMAL
METHADONE SCREEN, URINE: NEGATIVE
METHAMPHETAMINE, URINE: ABNORMAL
MODE: ABNORMAL
MONOCYTES # BLD: 9 % (ref 3–12)
MUCUS: NORMAL
NEGATIVE BASE EXCESS, VEN: ABNORMAL (ref 0–2)
NITRITE, URINE: NEGATIVE
NRBC AUTOMATED: 0 PER 100 WBC
O2 DEVICE/FLOW/%: ABNORMAL
O2 SAT, VEN: 75 % (ref 60–85)
OPIATES, URINE: NEGATIVE
OTHER OBSERVATIONS UA: NORMAL
OXYCODONE SCREEN URINE: NEGATIVE
PATIENT TEMP: ABNORMAL
PCO2, VEN: 51.7 MM HG (ref 41–51)
PDW BLD-RTO: 13.1 % (ref 11.8–14.4)
PH UA: 6.5 (ref 5–8)
PH VENOUS: 7.35 (ref 7.32–7.43)
PHENCYCLIDINE, URINE: NEGATIVE
PLATELET # BLD: NORMAL K/UL (ref 138–453)
PLATELET ESTIMATE: NORMAL
PLATELET, FLUORESCENCE: NORMAL K/UL (ref 138–453)
PMV BLD AUTO: NORMAL FL (ref 8.1–13.5)
PO2, VEN: 42.8 MM HG (ref 30–50)
POC CHLORIDE: 106 MMOL/L (ref 98–107)
POC CREATININE: 0.97 MG/DL (ref 0.51–1.19)
POC HEMATOCRIT: 41 % (ref 41–53)
POC HEMOGLOBIN: 13.8 G/DL (ref 13.5–17.5)
POC IONIZED CALCIUM: 1.22 MMOL/L (ref 1.15–1.33)
POC LACTIC ACID: 0.7 MMOL/L (ref 0.56–1.39)
POC PCO2 TEMP: ABNORMAL MM HG
POC PH TEMP: ABNORMAL
POC PO2 TEMP: ABNORMAL MM HG
POC POTASSIUM: 3.5 MMOL/L (ref 3.5–4.5)
POC SODIUM: 142 MMOL/L (ref 138–146)
POSITIVE BASE EXCESS, VEN: 2 (ref 0–3)
POTASSIUM SERPL-SCNC: 4 MMOL/L (ref 3.7–5.3)
PROPOXYPHENE, URINE: ABNORMAL
PROTEIN UA: NEGATIVE
RBC # BLD: 4.42 M/UL (ref 4.21–5.77)
RBC # BLD: NORMAL 10*6/UL
RBC UA: NORMAL /HPF (ref 0–2)
RENAL EPITHELIAL, UA: NORMAL /HPF
SALICYLATE LEVEL: <1 MG/DL (ref 3–10)
SAMPLE SITE: ABNORMAL
SEG NEUTROPHILS: 64 % (ref 36–65)
SEGMENTED NEUTROPHILS ABSOLUTE COUNT: 6.47 K/UL (ref 1.5–8.1)
SODIUM BLD-SCNC: 138 MMOL/L (ref 135–144)
SPECIFIC GRAVITY UA: 1.01 (ref 1–1.03)
TEST INFORMATION: ABNORMAL
TOTAL CO2, VENOUS: 30 MMOL/L (ref 23–30)
TOTAL PROTEIN: 6.7 G/DL (ref 6.4–8.3)
TOXIC TRICYCLIC SC,BLOOD: NEGATIVE
TRICHOMONAS: NORMAL
TRICYCLIC ANTIDEPRESSANTS, UR: ABNORMAL
TROPONIN INTERP: NORMAL
TROPONIN T: NORMAL NG/ML
TROPONIN, HIGH SENSITIVITY: <6 NG/L (ref 0–22)
TURBIDITY: CLEAR
URINE HGB: NEGATIVE
UROBILINOGEN, URINE: NORMAL
WBC # BLD: 10.2 K/UL (ref 3.5–11.3)
WBC # BLD: NORMAL 10*3/UL
WBC UA: NORMAL /HPF (ref 0–5)
YEAST: NORMAL

## 2019-08-10 PROCEDURE — 84132 ASSAY OF SERUM POTASSIUM: CPT

## 2019-08-10 PROCEDURE — 70450 CT HEAD/BRAIN W/O DYE: CPT

## 2019-08-10 PROCEDURE — 96376 TX/PRO/DX INJ SAME DRUG ADON: CPT

## 2019-08-10 PROCEDURE — 93005 ELECTROCARDIOGRAM TRACING: CPT | Performed by: STUDENT IN AN ORGANIZED HEALTH CARE EDUCATION/TRAINING PROGRAM

## 2019-08-10 PROCEDURE — 82803 BLOOD GASES ANY COMBINATION: CPT

## 2019-08-10 PROCEDURE — 80307 DRUG TEST PRSMV CHEM ANLYZR: CPT

## 2019-08-10 PROCEDURE — 87641 MR-STAPH DNA AMP PROBE: CPT

## 2019-08-10 PROCEDURE — 96365 THER/PROPH/DIAG IV INF INIT: CPT

## 2019-08-10 PROCEDURE — 71045 X-RAY EXAM CHEST 1 VIEW: CPT

## 2019-08-10 PROCEDURE — 83605 ASSAY OF LACTIC ACID: CPT

## 2019-08-10 PROCEDURE — 6360000002 HC RX W HCPCS

## 2019-08-10 PROCEDURE — 82140 ASSAY OF AMMONIA: CPT

## 2019-08-10 PROCEDURE — 84484 ASSAY OF TROPONIN QUANT: CPT

## 2019-08-10 PROCEDURE — 2700000000 HC OXYGEN THERAPY PER DAY

## 2019-08-10 PROCEDURE — 94770 HC ETCO2 MONITOR DAILY: CPT

## 2019-08-10 PROCEDURE — 6360000002 HC RX W HCPCS: Performed by: STUDENT IN AN ORGANIZED HEALTH CARE EDUCATION/TRAINING PROGRAM

## 2019-08-10 PROCEDURE — 80048 BASIC METABOLIC PNL TOTAL CA: CPT

## 2019-08-10 PROCEDURE — 94761 N-INVAS EAR/PLS OXIMETRY MLT: CPT

## 2019-08-10 PROCEDURE — 80076 HEPATIC FUNCTION PANEL: CPT

## 2019-08-10 PROCEDURE — 51701 INSERT BLADDER CATHETER: CPT

## 2019-08-10 PROCEDURE — 85055 RETICULATED PLATELET ASSAY: CPT

## 2019-08-10 PROCEDURE — 94760 N-INVAS EAR/PLS OXIMETRY 1: CPT

## 2019-08-10 PROCEDURE — 82565 ASSAY OF CREATININE: CPT

## 2019-08-10 PROCEDURE — 2580000003 HC RX 258: Performed by: STUDENT IN AN ORGANIZED HEALTH CARE EDUCATION/TRAINING PROGRAM

## 2019-08-10 PROCEDURE — 82947 ASSAY GLUCOSE BLOOD QUANT: CPT

## 2019-08-10 PROCEDURE — G0480 DRUG TEST DEF 1-7 CLASSES: HCPCS

## 2019-08-10 PROCEDURE — 82330 ASSAY OF CALCIUM: CPT

## 2019-08-10 PROCEDURE — 2000000000 HC ICU R&B

## 2019-08-10 PROCEDURE — 82435 ASSAY OF BLOOD CHLORIDE: CPT

## 2019-08-10 PROCEDURE — 84295 ASSAY OF SERUM SODIUM: CPT

## 2019-08-10 PROCEDURE — 99285 EMERGENCY DEPT VISIT HI MDM: CPT

## 2019-08-10 PROCEDURE — 85014 HEMATOCRIT: CPT

## 2019-08-10 PROCEDURE — 81001 URINALYSIS AUTO W/SCOPE: CPT

## 2019-08-10 PROCEDURE — 85025 COMPLETE CBC W/AUTO DIFF WBC: CPT

## 2019-08-10 PROCEDURE — 51798 US URINE CAPACITY MEASURE: CPT

## 2019-08-10 RX ORDER — SODIUM CHLORIDE 0.9 % (FLUSH) 0.9 %
10 SYRINGE (ML) INJECTION PRN
Status: DISCONTINUED | OUTPATIENT
Start: 2019-08-10 | End: 2019-08-11 | Stop reason: HOSPADM

## 2019-08-10 RX ORDER — NALOXONE HYDROCHLORIDE 1 MG/ML
INJECTION INTRAMUSCULAR; INTRAVENOUS; SUBCUTANEOUS
Status: DISPENSED
Start: 2019-08-10 | End: 2019-08-11

## 2019-08-10 RX ORDER — NALOXONE HYDROCHLORIDE 0.4 MG/ML
0.4 INJECTION, SOLUTION INTRAMUSCULAR; INTRAVENOUS; SUBCUTANEOUS PRN
Status: DISCONTINUED | OUTPATIENT
Start: 2019-08-10 | End: 2019-08-11 | Stop reason: HOSPADM

## 2019-08-10 RX ORDER — NALOXONE HYDROCHLORIDE 1 MG/ML
INJECTION INTRAMUSCULAR; INTRAVENOUS; SUBCUTANEOUS
Status: COMPLETED
Start: 2019-08-10 | End: 2019-08-10

## 2019-08-10 RX ORDER — 0.9 % SODIUM CHLORIDE 0.9 %
1000 INTRAVENOUS SOLUTION INTRAVENOUS ONCE
Status: COMPLETED | OUTPATIENT
Start: 2019-08-10 | End: 2019-08-10

## 2019-08-10 RX ORDER — ONDANSETRON 2 MG/ML
4 INJECTION INTRAMUSCULAR; INTRAVENOUS EVERY 6 HOURS PRN
Status: DISCONTINUED | OUTPATIENT
Start: 2019-08-10 | End: 2019-08-11 | Stop reason: HOSPADM

## 2019-08-10 RX ORDER — SODIUM CHLORIDE 0.9 % (FLUSH) 0.9 %
10 SYRINGE (ML) INJECTION EVERY 12 HOURS SCHEDULED
Status: DISCONTINUED | OUTPATIENT
Start: 2019-08-10 | End: 2019-08-11 | Stop reason: HOSPADM

## 2019-08-10 RX ORDER — DOCUSATE SODIUM 100 MG/1
100 CAPSULE, LIQUID FILLED ORAL 2 TIMES DAILY PRN
Status: DISCONTINUED | OUTPATIENT
Start: 2019-08-10 | End: 2019-08-11 | Stop reason: HOSPADM

## 2019-08-10 RX ADMIN — NALOXONE HYDROCHLORIDE 4 MG: 1 INJECTION PARENTERAL at 14:18

## 2019-08-10 RX ADMIN — NALOXONE HYDROCHLORIDE 2 MG: 1 INJECTION PARENTERAL at 13:15

## 2019-08-10 RX ADMIN — ENOXAPARIN SODIUM 40 MG: 40 INJECTION SUBCUTANEOUS at 19:50

## 2019-08-10 RX ADMIN — NALOXONE HYDROCHLORIDE 10 MG/HR: 1 INJECTION PARENTERAL at 14:04

## 2019-08-10 RX ADMIN — Medication 10 ML: at 20:00

## 2019-08-10 RX ADMIN — SODIUM CHLORIDE 1000 ML: 9 INJECTION, SOLUTION INTRAVENOUS at 13:34

## 2019-08-10 ASSESSMENT — PAIN SCALES - GENERAL: PAINLEVEL_OUTOF10: 0

## 2019-08-10 NOTE — ED NOTES
Pt resting comfortably on cot. Pt hard to arouse. Only responds to pain. TPD remains at bedside. Call light within reach.  Will cont to monitor     Jaqueline Naylor RN  08/10/19 4919

## 2019-08-10 NOTE — H&P
results found for: TSH     Urinalysis:   No results for input(s): COLORU, PHUR, LABCAST, WBCUA, RBCUA, MUCUS, TRICHOMONAS, YEAST, BACTERIA, CLARITYU, SPECGRAV, LEUKOCYTESUR, UROBILINOGEN, Donzella Cone in the last 72 hours. Invalid input(s): NITRATE, GLUCOSEUKETONESUAMORPHOUS        RADIOLOGY:  CT HEAD WO CONTRAST   Final Result   No acute intracranial abnormality. Mild left maxillary disease. XR CHEST PORTABLE   Final Result   No acute cardiopulmonary process. Interstitial markings are mildly prominent   which may represent chronic change. No localized consolidation. CARDIOLOGY:  Recent Cardiac Catheterization summary:    Last Echocardiogram findings:   No results found for this or any previous visit. No results found for this or any previous visit. Assessment and Plan     Patient Active Problem List   Diagnosis    Respiratory depression       PLAN/MEDICAL DECISION MAKING:  Neurologic: ?Opiate overdose vs clonidine overdose. Somnolent but responds to painful stimuli. No focal deficits. Slight hyperammonemia may be contributing to altered mentation. · Neuro checks per protocol  · Continue narcan drip  · Monitor for s/s of withdrawal    Cardiovascular:  No prior cardiac hx known. Nonspecific EKG  · Hemodynamically stable  · MAP goal 65  · Telemetr  Pulmonary: No prior hx of pulmonary issues on chart review. Multiple apneic episodes per ED team. Satting 97% on 2L NC. Mild hypercarbia on VBG. · Maintain oxygen sats >92%  · Pulmonary toilet  · Repeat VBG if respiratory depression worsens, low threshold to intubate     GI/Nutrition  · daily documentation of bowel movement  · Ulcer Prophylaxis: not indicated  · Diet: NPO until mentation improved   Renal/Fluid/Electrolyte  · IV Fluids: 0.9NS @ 100 mL/Hr   · I/O: No intake/output data recorded.   · UOP: monitor Is/Os closely  · Monitor electrolytes, replace PRN   ID  WBC:   Lab Results   Component Value Date    WBC 10.2 08/10/2019

## 2019-08-10 NOTE — ED NOTES
4mg narcan/500ml NS started. Running over 1 hour per Dr Jaqueline Gomez.      Karen Mesa RN  08/10/19 0468

## 2019-08-10 NOTE — LETTER
[facility]  [address] [facility] [address]  Phone: [phone]             August 12, 2019    Patient: Zeke Hawk   YOB: 1985   Date of Visit: 8/10/2019       To Whom It May Concern:    Arin Menjivar was seen and treated in our facility  beginning 8/10/2019 until 8/11/2019. He may return to work 8/11/19 and can return with full duty.       Sincerely,       Vineet Alexis MD         Signature:__________________________________ none

## 2019-08-10 NOTE — ED NOTES
4mg ivp narcan given. Pt pinpoint, briefly awakes to painful stimuli.  Pt drooling on self     Ashleigh Linda RN  08/10/19 2375

## 2019-08-10 NOTE — ED PROVIDER NOTES
[facility] ED  Emergency Department Encounter  Emergency Medicine Resident     Pt Name: Nabila Jackson  MRN: [id number]  [place] 1985  Date of evaluation: 8/10/19  PCP:  Lenny Perales MD    CHIEF COMPLAINT       Chief Complaint   Patient presents with    Drug Overdose       HISTORY Santa  (Location/Symptom, Timing/Onset, Context/Setting, Quality, Duration, Modifying Factors,Severity.)      Nabila Jackson is a 37 y. o.yo male who presents with decreased responsiveness and respiratory depression after possible overdose. Patient came in custody of [place] police after suspected overdose. He was taken to the police station and found unresponsive. Patient was given a total of 18 mg Narcan prior to arrival in the emergency department. Upon arrival in the emergency department, patient continues to be very drowsy. He does respond to loud verbal cues and sternal rub. He answers questions and gets aggressive with sternal rubbing. He denies any ingestion pain. PAST MEDICAL / SURGICAL / SOCIAL / FAMILY HISTORY      has a past medical history of Bronchitis, Difficult intubation, Heartburn, Inguinal hernia, and Snores. has a past surgical history that includes Tonsillectomy; Inguinal hernia repair (Left, 04/13/2018); and pr lap,inguinal hernia repr,initial (Left, 4/13/2018).      Social History     Socioeconomic History    Marital status: Single     Spouse name: Not on file    Number of children: Not on file    Years of education: Not on file    Highest education level: Not on file   Occupational History    Not on file   Social Needs    Financial resource strain: Not on file    Food insecurity:     Worry: Not on file     Inability: Not on file    Transportation needs:     Medical: Not on file     Non-medical: Not on file   Tobacco Use    Smoking status: Current Every Day Smoker     Packs/day: 0.50     Years: 20.00     Pack years: 10.00     Types: Cigarettes    Smokeless unit routine    Telemetry monitoring    Daily weights    Intake and output    Tobacco cessation education    Place intermittent pneumatic compression device    Notify physician    Up with assistance    Adv Diet as Demario (nurse communication)    Full Code    Inpatient consult to Critical Care    End Tidal CO2 Continuous    Initiate Oxygen Therapy Protocol    Respiratory care evaluation only    POC Blood Gas    POC Glucose Fingerstick    Venous Blood Gas, POC    Creatinine W/GFR Point of Care    Lactic Acid, POC    POCT Glucose    Anion Gap (Calc) POC    EKG 12 Lead    Insert peripheral IV    PATIENT STATUS (FROM ED OR OR/PROCEDURAL) Inpatient       MEDICATIONS ORDERED:  Orders Placed This Encounter   Medications    0.9 % sodium chloride bolus    naloxone (NARCAN) 2 MG/2ML injection     THIEDE, MERCY: cabinet override    naloxone (NARCAN) 2 mg in sodium chloride 0.9 % 500 mL infusion    naloxone (NARCAN) injection 0.4 mg    naloxone (NARCAN) 2 MG/2ML injection     THIEDE, [facility]: cabinet override    naloxone (NARCAN) 2 MG/2ML injection     THIEDE, [facility]CY: cabinet override    docusate sodium (COLACE) capsule 100 mg    sodium chloride flush 0.9 % injection 10 mL    sodium chloride flush 0.9 % injection 10 mL    magnesium hydroxide (MILK OF MAGNESIA) 400 MG/5ML suspension 30 mL    ondansetron (ZOFRAN) injection 4 mg    enoxaparin (LOVENOX) injection 40 mg    DISCONTD: lactulose enema       DDX: opioid overdose, clonidine overdose, olanzapine overdose, other ingestion    Initial MDM/Plan: 37 y.o. male who presents with initial unresponsiveness, partly relieved with Narcan. Patient appears to be presenting with classic opioid toxidrome. He is drowsy, arousable with loud verbal cues and sternal rubs. He does have intermittent periods of respiratory depression resulting in apnea however he is able to be aroused and then can maintain airway without difficulty.   During the times of decreased abnormal extra-axial fluid collection. The gray-white differentiation is maintained without evidence of an acute infarct. There is no evidence of hydrocephalus. ORBITS: The visualized portion of the orbits demonstrate no acute abnormality. SINUSES: Mild left maxillary sinus disease. SOFT TISSUES/SKULL:  No acute abnormality of the visualized skull or soft tissues. No acute intracranial abnormality. Mild left maxillary disease. Xr Chest Portable    Result Date: 8/10/2019  EXAMINATION: ONE XRAY VIEW OF THE CHEST 8/10/2019 1:02 pm COMPARISON: None. HISTORY: ORDERING SYSTEM PROVIDED HISTORY: OD TECHNOLOGIST PROVIDED HISTORY: OD FINDINGS: AP portable view of the chest time stamped at 1256 hours demonstrates overlying cardiac monitoring electrodes. No cardiomegaly, localized consolidation, effusion, or pneumothorax is noted. Interstitial markings are mildly prominent without evidence of edema. A mild dextroscoliotic curvature is noted. Mediastinal contours are within normal limits. No acute cardiopulmonary process. Interstitial markings are mildly prominent which may represent chronic change. No localized consolidation. EKG  EKG Interpretation    Interpreted by me    Rhythm: normal sinus   Rate: normal  Axis: normal  Ectopy: none  Conduction: normal  ST Segments: no acute change  T Waves: no acute change  Q Waves: none    Clinical Impression: no acute changes and normal EKG    All EKG's are interpreted by the Emergency Department Physicianwho either signs or Co-signs this chart in the absence of a cardiologist.    EMERGENCY DEPARTMENT COURSE:  ED Course as of Aug 10 1808   Sat Aug 10, 2019   1321 Received initial dose of 18 mg Narcan prior to arrival.  Due to multiple apnea episodes, he received another 2 mg while in the emergency department. Patient continues to have pinpoint pupils and periodic periods of apnea.   Due to continued decreased responsiveness, will place patient on Narcan

## 2019-08-10 NOTE — CARE COORDINATION
Pt is in custody-officers at bedside. Spoke with them, plan is to return to shelter [place]. Pt is from [facility].

## 2019-08-10 NOTE — ED NOTES
Dr. Michael Fermin at bedside     Rebecca Hennessy, [facility]  08/10/19 3415

## 2019-08-11 VITALS
OXYGEN SATURATION: 100 % | RESPIRATION RATE: 18 BRPM | HEART RATE: 78 BPM | WEIGHT: 126.98 LBS | HEIGHT: 70 IN | TEMPERATURE: 97.5 F | SYSTOLIC BLOOD PRESSURE: 108 MMHG | BODY MASS INDEX: 18.18 KG/M2 | DIASTOLIC BLOOD PRESSURE: 62 MMHG

## 2019-08-11 LAB
ABSOLUTE EOS #: 0.45 K/UL (ref 0–0.44)
ABSOLUTE IMMATURE GRANULOCYTE: <0.03 K/UL (ref 0–0.3)
ABSOLUTE LYMPH #: 2.7 K/UL (ref 1.1–3.7)
ABSOLUTE MONO #: 0.66 K/UL (ref 0.1–1.2)
ANION GAP SERPL CALCULATED.3IONS-SCNC: 11 MMOL/L (ref 9–17)
BASOPHILS # BLD: 1 % (ref 0–2)
BASOPHILS ABSOLUTE: 0.04 K/UL (ref 0–0.2)
BUN BLDV-MCNC: 9 MG/DL (ref 6–20)
BUN/CREAT BLD: ABNORMAL (ref 9–20)
CALCIUM SERPL-MCNC: 8.3 MG/DL (ref 8.6–10.4)
CHLORIDE BLD-SCNC: 102 MMOL/L (ref 98–107)
CO2: 25 MMOL/L (ref 20–31)
CREAT SERPL-MCNC: 0.86 MG/DL (ref 0.7–1.2)
DIFFERENTIAL TYPE: ABNORMAL
EOSINOPHILS RELATIVE PERCENT: 6 % (ref 1–4)
GFR AFRICAN AMERICAN: >60 ML/MIN
GFR NON-AFRICAN AMERICAN: >60 ML/MIN
GFR SERPL CREATININE-BSD FRML MDRD: ABNORMAL ML/MIN/{1.73_M2}
GFR SERPL CREATININE-BSD FRML MDRD: ABNORMAL ML/MIN/{1.73_M2}
GLUCOSE BLD-MCNC: 81 MG/DL (ref 70–99)
GLUCOSE BLD-MCNC: 85 MG/DL (ref 75–110)
HCT VFR BLD CALC: 44.9 % (ref 40.7–50.3)
HEMOGLOBIN: 14.1 G/DL (ref 13–17)
IMMATURE GRANULOCYTES: 0 %
LYMPHOCYTES # BLD: 36 % (ref 24–43)
MAGNESIUM: 2.1 MG/DL (ref 1.6–2.6)
MCH RBC QN AUTO: 30.7 PG (ref 25.2–33.5)
MCHC RBC AUTO-ENTMCNC: 31.4 G/DL (ref 28.4–34.8)
MCV RBC AUTO: 97.8 FL (ref 82.6–102.9)
MONOCYTES # BLD: 9 % (ref 3–12)
MRSA, DNA, NASAL: ABNORMAL
NRBC AUTOMATED: 0 PER 100 WBC
PDW BLD-RTO: 13 % (ref 11.8–14.4)
PLATELET # BLD: 328 K/UL (ref 138–453)
PLATELET ESTIMATE: ABNORMAL
PMV BLD AUTO: 9.8 FL (ref 8.1–13.5)
POTASSIUM SERPL-SCNC: 3.5 MMOL/L (ref 3.7–5.3)
RBC # BLD: 4.59 M/UL (ref 4.21–5.77)
RBC # BLD: ABNORMAL 10*6/UL
SEG NEUTROPHILS: 48 % (ref 36–65)
SEGMENTED NEUTROPHILS ABSOLUTE COUNT: 3.56 K/UL (ref 1.5–8.1)
SODIUM BLD-SCNC: 138 MMOL/L (ref 135–144)
SPECIMEN DESCRIPTION: ABNORMAL
WBC # BLD: 7.4 K/UL (ref 3.5–11.3)
WBC # BLD: ABNORMAL 10*3/UL

## 2019-08-11 PROCEDURE — 85025 COMPLETE CBC W/AUTO DIFF WBC: CPT

## 2019-08-11 PROCEDURE — 36415 COLL VENOUS BLD VENIPUNCTURE: CPT

## 2019-08-11 PROCEDURE — 80048 BASIC METABOLIC PNL TOTAL CA: CPT

## 2019-08-11 PROCEDURE — 83735 ASSAY OF MAGNESIUM: CPT

## 2019-08-11 PROCEDURE — 99222 1ST HOSP IP/OBS MODERATE 55: CPT | Performed by: INTERNAL MEDICINE

## 2019-08-11 PROCEDURE — 94761 N-INVAS EAR/PLS OXIMETRY MLT: CPT

## 2019-08-11 PROCEDURE — 94770 HC ETCO2 MONITOR DAILY: CPT

## 2019-08-11 ASSESSMENT — PAIN SCALES - GENERAL
PAINLEVEL_OUTOF10: 0
PAINLEVEL_OUTOF10: 0

## 2019-08-11 ASSESSMENT — ENCOUNTER SYMPTOMS
SHORTNESS OF BREATH: 0
SINUS PAIN: 0
ABDOMINAL PAIN: 0
EYE REDNESS: 0
VOMITING: 0
WHEEZING: 0
COUGH: 0
APNEA: 0
RHINORRHEA: 0
DIARRHEA: 0
NAUSEA: 0
EYE PAIN: 0
BLOOD IN STOOL: 0

## 2019-08-12 LAB
EKG ATRIAL RATE: 67 BPM
EKG P AXIS: 85 DEGREES
EKG P-R INTERVAL: 140 MS
EKG Q-T INTERVAL: 406 MS
EKG QRS DURATION: 98 MS
EKG QTC CALCULATION (BAZETT): 429 MS
EKG R AXIS: -16 DEGREES
EKG T AXIS: 35 DEGREES
EKG VENTRICULAR RATE: 67 BPM

## 2019-08-12 PROCEDURE — 93010 ELECTROCARDIOGRAM REPORT: CPT | Performed by: INTERNAL MEDICINE

## 2020-08-13 ENCOUNTER — HOSPITAL ENCOUNTER (EMERGENCY)
Age: 35
Discharge: ANOTHER HOSPITAL | End: 2020-08-14
Attending: EMERGENCY MEDICINE
Payer: MEDICAID

## 2020-08-13 PROCEDURE — 94002 VENT MGMT INPAT INIT DAY: CPT

## 2020-08-13 PROCEDURE — 85610 PROTHROMBIN TIME: CPT

## 2020-08-13 PROCEDURE — U0002 COVID-19 LAB TEST NON-CDC: HCPCS

## 2020-08-13 PROCEDURE — 6360000002 HC RX W HCPCS: Performed by: EMERGENCY MEDICINE

## 2020-08-13 PROCEDURE — 85730 THROMBOPLASTIN TIME PARTIAL: CPT

## 2020-08-13 PROCEDURE — 84484 ASSAY OF TROPONIN QUANT: CPT

## 2020-08-13 PROCEDURE — 80053 COMPREHEN METABOLIC PANEL: CPT

## 2020-08-13 PROCEDURE — G0480 DRUG TEST DEF 1-7 CLASSES: HCPCS

## 2020-08-13 PROCEDURE — 2500000003 HC RX 250 WO HCPCS: Performed by: EMERGENCY MEDICINE

## 2020-08-13 PROCEDURE — 36415 COLL VENOUS BLD VENIPUNCTURE: CPT

## 2020-08-13 PROCEDURE — 99285 EMERGENCY DEPT VISIT HI MDM: CPT

## 2020-08-13 PROCEDURE — 85025 COMPLETE CBC W/AUTO DIFF WBC: CPT

## 2020-08-13 PROCEDURE — 80307 DRUG TEST PRSMV CHEM ANLYZR: CPT

## 2020-08-13 RX ORDER — MIDAZOLAM HYDROCHLORIDE 1 MG/ML
2 INJECTION INTRAMUSCULAR; INTRAVENOUS ONCE
Status: COMPLETED | OUTPATIENT
Start: 2020-08-14 | End: 2020-08-14

## 2020-08-13 RX ORDER — SUCCINYLCHOLINE CHLORIDE 20 MG/ML
INJECTION INTRAMUSCULAR; INTRAVENOUS DAILY PRN
Status: DISCONTINUED | OUTPATIENT
Start: 2020-08-13 | End: 2020-08-14

## 2020-08-13 RX ORDER — ETOMIDATE 2 MG/ML
INJECTION INTRAVENOUS DAILY PRN
Status: DISCONTINUED | OUTPATIENT
Start: 2020-08-13 | End: 2020-08-14

## 2020-08-13 RX ORDER — PROPOFOL 10 MG/ML
10 INJECTION, EMULSION INTRAVENOUS ONCE
Status: COMPLETED | OUTPATIENT
Start: 2020-08-14 | End: 2020-08-13

## 2020-08-13 RX ORDER — PROPOFOL 10 MG/ML
INJECTION, EMULSION INTRAVENOUS DAILY PRN
Status: DISCONTINUED | OUTPATIENT
Start: 2020-08-13 | End: 2020-08-14

## 2020-08-13 RX ADMIN — PROPOFOL 40 MG: 10 INJECTION, EMULSION INTRAVENOUS at 23:47

## 2020-08-13 RX ADMIN — PROPOFOL 25 MCG/KG/MIN: 10 INJECTION, EMULSION INTRAVENOUS at 23:49

## 2020-08-13 RX ADMIN — SUCCINYLCHOLINE CHLORIDE 100 MG: 20 INJECTION, SOLUTION INTRAMUSCULAR; INTRAVENOUS at 23:31

## 2020-08-13 RX ADMIN — ETOMIDATE 30 MG: 2 INJECTION, SOLUTION INTRAVENOUS at 23:31

## 2020-08-13 RX ADMIN — PROPOFOL 20 MCG/KG/MIN: 10 INJECTION, EMULSION INTRAVENOUS at 23:38

## 2020-08-13 RX ADMIN — PROPOFOL 40 MG: 10 INJECTION, EMULSION INTRAVENOUS at 23:38

## 2020-08-13 RX ADMIN — PROPOFOL 40 MG: 10 INJECTION, EMULSION INTRAVENOUS at 23:40

## 2020-08-14 ENCOUNTER — APPOINTMENT (OUTPATIENT)
Dept: GENERAL RADIOLOGY | Age: 35
DRG: 812 | End: 2020-08-14
Attending: INTERNAL MEDICINE
Payer: MEDICAID

## 2020-08-14 ENCOUNTER — APPOINTMENT (OUTPATIENT)
Dept: CT IMAGING | Age: 35
End: 2020-08-14
Payer: MEDICAID

## 2020-08-14 ENCOUNTER — HOSPITAL ENCOUNTER (INPATIENT)
Age: 35
LOS: 12 days | Discharge: HOME | DRG: 812 | End: 2020-08-26
Attending: INTERNAL MEDICINE | Admitting: INTERNAL MEDICINE
Payer: MEDICAID

## 2020-08-14 ENCOUNTER — APPOINTMENT (OUTPATIENT)
Dept: GENERAL RADIOLOGY | Age: 35
End: 2020-08-14
Payer: MEDICAID

## 2020-08-14 VITALS
DIASTOLIC BLOOD PRESSURE: 92 MMHG | BODY MASS INDEX: 21.47 KG/M2 | TEMPERATURE: 98.7 F | HEIGHT: 70 IN | HEART RATE: 112 BPM | WEIGHT: 150 LBS | RESPIRATION RATE: 33 BRPM | SYSTOLIC BLOOD PRESSURE: 127 MMHG | OXYGEN SATURATION: 97 %

## 2020-08-14 PROBLEM — J96.01 ACUTE RESPIRATORY FAILURE WITH HYPOXIA (HCC): Status: ACTIVE | Noted: 2020-08-14

## 2020-08-14 LAB
-: ABNORMAL
ABSOLUTE EOS #: 0.1 K/UL (ref 0–0.4)
ABSOLUTE EOS #: <0.03 K/UL (ref 0–0.44)
ABSOLUTE IMMATURE GRANULOCYTE: 0.05 K/UL (ref 0–0.3)
ABSOLUTE IMMATURE GRANULOCYTE: ABNORMAL K/UL (ref 0–0.3)
ABSOLUTE LYMPH #: 1.25 K/UL (ref 1.1–3.7)
ABSOLUTE LYMPH #: 4.4 K/UL (ref 1–4.8)
ABSOLUTE MONO #: 0.3 K/UL (ref 0.1–1.3)
ABSOLUTE MONO #: 0.76 K/UL (ref 0.1–1.2)
ACETAMINOPHEN LEVEL: <5 UG/ML (ref 10–30)
ACTION: NORMAL
ALBUMIN SERPL-MCNC: 3.5 G/DL (ref 3.5–5.2)
ALBUMIN SERPL-MCNC: 3.8 G/DL (ref 3.5–5.2)
ALBUMIN/GLOBULIN RATIO: 1.4 (ref 1–2.5)
ALBUMIN/GLOBULIN RATIO: ABNORMAL (ref 1–2.5)
ALLEN TEST: ABNORMAL
ALLEN TEST: POSITIVE
ALLEN TEST: POSITIVE
ALP BLD-CCNC: 66 U/L (ref 40–129)
ALP BLD-CCNC: 79 U/L (ref 40–129)
ALT SERPL-CCNC: 50 U/L (ref 5–41)
ALT SERPL-CCNC: 58 U/L (ref 5–41)
AMORPHOUS: ABNORMAL
AMPHETAMINE SCREEN URINE: NEGATIVE
ANION GAP SERPL CALCULATED.3IONS-SCNC: 11 MMOL/L (ref 9–17)
ANION GAP SERPL CALCULATED.3IONS-SCNC: 15 MMOL/L (ref 9–17)
AST SERPL-CCNC: 54 U/L
AST SERPL-CCNC: 58 U/L
BACTERIA: ABNORMAL
BARBITURATE SCREEN URINE: NEGATIVE
BASOPHILS # BLD: 0 % (ref 0–2)
BASOPHILS # BLD: 1 % (ref 0–2)
BASOPHILS ABSOLUTE: 0 K/UL (ref 0–0.2)
BASOPHILS ABSOLUTE: 0.07 K/UL (ref 0–0.2)
BENZODIAZEPINE SCREEN, URINE: NEGATIVE
BILIRUB SERPL-MCNC: 0.24 MG/DL (ref 0.3–1.2)
BILIRUB SERPL-MCNC: 0.89 MG/DL (ref 0.3–1.2)
BILIRUBIN URINE: NEGATIVE
BUN BLDV-MCNC: 14 MG/DL (ref 6–20)
BUN BLDV-MCNC: 15 MG/DL (ref 6–20)
BUN/CREAT BLD: ABNORMAL (ref 9–20)
BUN/CREAT BLD: ABNORMAL (ref 9–20)
BUPRENORPHINE URINE: ABNORMAL
CALCIUM IONIZED: 1.06 MMOL/L (ref 1.13–1.33)
CALCIUM SERPL-MCNC: 8.1 MG/DL (ref 8.6–10.4)
CALCIUM SERPL-MCNC: 8.1 MG/DL (ref 8.6–10.4)
CANNABINOID SCREEN URINE: POSITIVE
CARBOXYHEMOGLOBIN: 1.6 % (ref 0–5)
CARBOXYHEMOGLOBIN: 2.3 % (ref 0–5)
CASTS UA: ABNORMAL /LPF
CASTS UA: ABNORMAL /LPF
CHLORIDE BLD-SCNC: 103 MMOL/L (ref 98–107)
CHLORIDE BLD-SCNC: 107 MMOL/L (ref 98–107)
CO2: 20 MMOL/L (ref 20–31)
CO2: 22 MMOL/L (ref 20–31)
COCAINE METABOLITE, URINE: NEGATIVE
COLOR: YELLOW
COMMENT UA: ABNORMAL
CREAT SERPL-MCNC: 0.92 MG/DL (ref 0.7–1.2)
CREAT SERPL-MCNC: 1.53 MG/DL (ref 0.7–1.2)
CRYSTALS, UA: ABNORMAL /HPF
CULTURE: NORMAL
DATE AND TIME: NORMAL
DIFFERENTIAL TYPE: ABNORMAL
DIFFERENTIAL TYPE: ABNORMAL
EKG ATRIAL RATE: 113 BPM
EKG ATRIAL RATE: 113 BPM
EKG ATRIAL RATE: 127 BPM
EKG P AXIS: 70 DEGREES
EKG P AXIS: 74 DEGREES
EKG P AXIS: 79 DEGREES
EKG P-R INTERVAL: 118 MS
EKG P-R INTERVAL: 118 MS
EKG P-R INTERVAL: 120 MS
EKG Q-T INTERVAL: 304 MS
EKG Q-T INTERVAL: 328 MS
EKG Q-T INTERVAL: 336 MS
EKG QRS DURATION: 92 MS
EKG QRS DURATION: 94 MS
EKG QRS DURATION: 96 MS
EKG QTC CALCULATION (BAZETT): 441 MS
EKG QTC CALCULATION (BAZETT): 449 MS
EKG QTC CALCULATION (BAZETT): 460 MS
EKG R AXIS: -17 DEGREES
EKG R AXIS: -55 DEGREES
EKG R AXIS: -58 DEGREES
EKG T AXIS: -18 DEGREES
EKG T AXIS: -49 DEGREES
EKG T AXIS: 63 DEGREES
EKG VENTRICULAR RATE: 113 BPM
EKG VENTRICULAR RATE: 113 BPM
EKG VENTRICULAR RATE: 127 BPM
EOSINOPHILS RELATIVE PERCENT: 0 % (ref 1–4)
EOSINOPHILS RELATIVE PERCENT: 1 % (ref 0–4)
EPITHELIAL CELLS UA: ABNORMAL /HPF
ETHANOL PERCENT: <0.01 %
ETHANOL: <10 MG/DL
FIO2: 100
FIO2: 100
FIO2: 55
FIO2: 55
FIO2: 65
FIO2: 70
FIO2: 70
FIO2: ABNORMAL
GFR AFRICAN AMERICAN: >60 ML/MIN
GFR AFRICAN AMERICAN: >60 ML/MIN
GFR NON-AFRICAN AMERICAN: 51 ML/MIN
GFR NON-AFRICAN AMERICAN: >60 ML/MIN
GFR SERPL CREATININE-BSD FRML MDRD: ABNORMAL ML/MIN/{1.73_M2}
GLUCOSE BLD-MCNC: 106 MG/DL (ref 70–99)
GLUCOSE BLD-MCNC: 120 MG/DL (ref 74–100)
GLUCOSE BLD-MCNC: 129 MG/DL (ref 74–100)
GLUCOSE BLD-MCNC: 129 MG/DL (ref 74–100)
GLUCOSE BLD-MCNC: 132 MG/DL (ref 74–100)
GLUCOSE BLD-MCNC: 316 MG/DL (ref 70–99)
GLUCOSE URINE: ABNORMAL
HCO3 ARTERIAL: 22.8 MMOL/L (ref 22–26)
HCO3 ARTERIAL: 22.9 MMOL/L (ref 22–26)
HCT VFR BLD CALC: 46.8 % (ref 41–53)
HCT VFR BLD CALC: 49.5 % (ref 40.7–50.3)
HEMOGLOBIN: 15.7 G/DL (ref 13.5–17.5)
HEMOGLOBIN: 16.1 G/DL (ref 13–17)
IMMATURE GRANULOCYTES: 0 %
IMMATURE GRANULOCYTES: ABNORMAL %
INR BLD: 1.1
KETONES, URINE: NEGATIVE
LACTIC ACID, WHOLE BLOOD: 2.1 MMOL/L (ref 0.7–2.1)
LACTIC ACID, WHOLE BLOOD: 2.4 MMOL/L (ref 0.7–2.1)
LACTIC ACID, WHOLE BLOOD: 2.8 MMOL/L (ref 0.7–2.1)
LACTIC ACID: ABNORMAL MMOL/L
LACTIC ACID: ABNORMAL MMOL/L
LACTIC ACID: NORMAL MMOL/L
LEUKOCYTE ESTERASE, URINE: NEGATIVE
LV EF: 33 %
LVEF MODALITY: NORMAL
LYMPHOCYTES # BLD: 10 % (ref 24–43)
LYMPHOCYTES # BLD: 48 % (ref 24–44)
Lab: NORMAL
MAGNESIUM: 1.8 MG/DL (ref 1.6–2.6)
MCH RBC QN AUTO: 31.6 PG (ref 25.2–33.5)
MCH RBC QN AUTO: 32.8 PG (ref 26–34)
MCHC RBC AUTO-ENTMCNC: 32.5 G/DL (ref 28.4–34.8)
MCHC RBC AUTO-ENTMCNC: 33.7 G/DL (ref 31–37)
MCV RBC AUTO: 97.2 FL (ref 82.6–102.9)
MCV RBC AUTO: 97.4 FL (ref 80–100)
MDMA URINE: ABNORMAL
METHADONE SCREEN, URINE: NEGATIVE
METHAMPHETAMINE, URINE: ABNORMAL
METHEMOGLOBIN: 0.5 % (ref 0–1.9)
METHEMOGLOBIN: 0.5 % (ref 0–1.9)
MODE: ABNORMAL
MONOCYTES # BLD: 3 % (ref 1–7)
MONOCYTES # BLD: 6 % (ref 3–12)
MRSA, DNA, NASAL: NORMAL
MUCUS: ABNORMAL
NEGATIVE BASE EXCESS, ART: 2 (ref 0–2)
NEGATIVE BASE EXCESS, ART: 3 (ref 0–2)
NEGATIVE BASE EXCESS, ART: 4 (ref 0–2)
NEGATIVE BASE EXCESS, ART: 4.9 MMOL/L (ref 0–2)
NEGATIVE BASE EXCESS, ART: 6.4 MMOL/L (ref 0–2)
NEGATIVE BASE EXCESS, ART: ABNORMAL (ref 0–2)
NITRITE, URINE: NEGATIVE
NOTIFICATION TIME: ABNORMAL
NOTIFICATION TIME: ABNORMAL
NOTIFICATION: ABNORMAL
NOTIFICATION: ABNORMAL
NOTIFY: NORMAL
NRBC AUTOMATED: 0 PER 100 WBC
NRBC AUTOMATED: ABNORMAL PER 100 WBC
O2 DEVICE/FLOW/%: ABNORMAL
O2 SAT, ARTERIAL: 85.5 % (ref 95–98)
O2 SAT, ARTERIAL: 93.1 % (ref 95–98)
OPIATES, URINE: NEGATIVE
OTHER OBSERVATIONS UA: ABNORMAL
OXYCODONE SCREEN URINE: NEGATIVE
OXYHEMOGLOBIN: ABNORMAL % (ref 95–98)
OXYHEMOGLOBIN: ABNORMAL % (ref 95–98)
PARTIAL THROMBOPLASTIN TIME: 23.3 SEC (ref 20.5–30.5)
PARTIAL THROMBOPLASTIN TIME: 26.2 SEC (ref 24–36)
PARTIAL THROMBOPLASTIN TIME: 39.3 SEC (ref 20.5–30.5)
PATIENT TEMP: ABNORMAL
PCO2 ARTERIAL: 56.3 MMHG (ref 35–45)
PCO2 ARTERIAL: 68.7 MMHG (ref 35–45)
PCO2, ART, TEMP ADJ: ABNORMAL (ref 35–45)
PCO2, ART, TEMP ADJ: ABNORMAL (ref 35–45)
PDW BLD-RTO: 13.1 % (ref 11.8–14.4)
PDW BLD-RTO: 14 % (ref 11.5–14.9)
PEEP/CPAP: 10
PEEP/CPAP: 14
PH ARTERIAL: 7.13 (ref 7.35–7.45)
PH ARTERIAL: 7.22 (ref 7.35–7.45)
PH UA: 6 (ref 5–8)
PH, ART, TEMP ADJ: ABNORMAL (ref 7.35–7.45)
PH, ART, TEMP ADJ: ABNORMAL (ref 7.35–7.45)
PHENCYCLIDINE, URINE: NEGATIVE
PHOSPHORUS: 3.3 MG/DL (ref 2.5–4.5)
PLATELET # BLD: 246 K/UL (ref 138–453)
PLATELET # BLD: 297 K/UL (ref 150–450)
PLATELET ESTIMATE: ABNORMAL
PLATELET ESTIMATE: ABNORMAL
PMV BLD AUTO: 8.7 FL (ref 6–12)
PMV BLD AUTO: 9.8 FL (ref 8.1–13.5)
PO2 ARTERIAL: 67.6 MMHG (ref 80–100)
PO2 ARTERIAL: 82.3 MMHG (ref 80–100)
PO2, ART, TEMP ADJ: ABNORMAL MMHG (ref 80–100)
PO2, ART, TEMP ADJ: ABNORMAL MMHG (ref 80–100)
POC HCO3: 24.6 MMOL/L (ref 21–28)
POC HCO3: 24.8 MMOL/L (ref 21–28)
POC HCO3: 28.9 MMOL/L (ref 21–28)
POC HCO3: 30.6 MMOL/L (ref 21–28)
POC HCO3: 30.9 MMOL/L (ref 21–28)
POC HCO3: 30.9 MMOL/L (ref 21–28)
POC LACTIC ACID: 1.47 MMOL/L (ref 0.56–1.39)
POC LACTIC ACID: 1.48 MMOL/L (ref 0.56–1.39)
POC O2 SATURATION: 100 % (ref 94–98)
POC O2 SATURATION: 94 % (ref 94–98)
POC O2 SATURATION: 96 % (ref 94–98)
POC O2 SATURATION: 98 % (ref 94–98)
POC O2 SATURATION: 98 % (ref 94–98)
POC O2 SATURATION: 99 % (ref 94–98)
POC PCO2 TEMP: ABNORMAL MM HG
POC PCO2: 106.5 MM HG (ref 35–48)
POC PCO2: 107.3 MM HG (ref 35–48)
POC PCO2: 45.6 MM HG (ref 35–48)
POC PCO2: 48.7 MM HG (ref 35–48)
POC PCO2: 75.6 MM HG (ref 35–48)
POC PCO2: 76.4 MM HG (ref 35–48)
POC PH TEMP: ABNORMAL
POC PH: 7.07 (ref 7.35–7.45)
POC PH: 7.07 (ref 7.35–7.45)
POC PH: 7.19 (ref 7.35–7.45)
POC PH: 7.21 (ref 7.35–7.45)
POC PH: 7.32 (ref 7.35–7.45)
POC PH: 7.34 (ref 7.35–7.45)
POC PO2 TEMP: ABNORMAL MM HG
POC PO2: 147.8 MM HG (ref 83–108)
POC PO2: 160.9 MM HG (ref 83–108)
POC PO2: 194.5 MM HG (ref 83–108)
POC PO2: 221.8 MM HG (ref 83–108)
POC PO2: 76.6 MM HG (ref 83–108)
POC PO2: 87.8 MM HG (ref 83–108)
POSITIVE BASE EXCESS, ART: 0 (ref 0–3)
POSITIVE BASE EXCESS, ART: ABNORMAL (ref 0–3)
POSITIVE BASE EXCESS, ART: ABNORMAL MMOL/L (ref 0–2)
POSITIVE BASE EXCESS, ART: ABNORMAL MMOL/L (ref 0–2)
POTASSIUM SERPL-SCNC: 4.3 MMOL/L (ref 3.7–5.3)
POTASSIUM SERPL-SCNC: 4.7 MMOL/L (ref 3.7–5.3)
PROPOXYPHENE, URINE: ABNORMAL
PROTEIN UA: ABNORMAL
PROTHROMBIN TIME: 14.2 SEC (ref 11.8–14.6)
PSV: ABNORMAL
PSV: ABNORMAL
PT. POSITION: ABNORMAL
PT. POSITION: ABNORMAL
RBC # BLD: 4.8 M/UL (ref 4.5–5.9)
RBC # BLD: 5.09 M/UL (ref 4.21–5.77)
RBC # BLD: ABNORMAL 10*6/UL
RBC # BLD: ABNORMAL 10*6/UL
RBC UA: ABNORMAL /HPF
READ BACK: YES
RENAL EPITHELIAL, UA: ABNORMAL /HPF
RESPIRATORY RATE: 20
RESPIRATORY RATE: 24
SALICYLATE LEVEL: <1 MG/DL (ref 3–10)
SAMPLE SITE: ABNORMAL
SARS-COV-2, PCR: NORMAL
SARS-COV-2, RAPID: NOT DETECTED
SARS-COV-2: NORMAL
SEG NEUTROPHILS: 48 % (ref 36–66)
SEG NEUTROPHILS: 83 % (ref 36–65)
SEGMENTED NEUTROPHILS ABSOLUTE COUNT: 10.92 K/UL (ref 1.5–8.1)
SEGMENTED NEUTROPHILS ABSOLUTE COUNT: 4.4 K/UL (ref 1.3–9.1)
SET RATE: 20
SET RATE: 24
SODIUM BLD-SCNC: 138 MMOL/L (ref 135–144)
SODIUM BLD-SCNC: 140 MMOL/L (ref 135–144)
SOURCE: NORMAL
SPECIFIC GRAVITY UA: 1.01 (ref 1–1.03)
SPECIMEN DESCRIPTION: NORMAL
SPECIMEN DESCRIPTION: NORMAL
TCO2 (CALC), ART: 26 MMOL/L (ref 22–29)
TCO2 (CALC), ART: 26 MMOL/L (ref 22–29)
TCO2 (CALC), ART: 31 MMOL/L (ref 22–29)
TCO2 (CALC), ART: 33 MMOL/L (ref 22–29)
TCO2 (CALC), ART: 34 MMOL/L (ref 22–29)
TCO2 (CALC), ART: 34 MMOL/L (ref 22–29)
TEST INFORMATION: ABNORMAL
TEXT FOR RESPIRATORY: ABNORMAL
TEXT FOR RESPIRATORY: ABNORMAL
TOTAL CK: 831 U/L (ref 39–308)
TOTAL HB: ABNORMAL G/DL (ref 12–16)
TOTAL HB: ABNORMAL G/DL (ref 12–16)
TOTAL PROTEIN: 6 G/DL (ref 6.4–8.3)
TOTAL PROTEIN: 6.4 G/DL (ref 6.4–8.3)
TOTAL RATE: 20
TOTAL RATE: 24
TOXIC TRICYCLIC SC,BLOOD: ABNORMAL
TRICHOMONAS: ABNORMAL
TRICYCLIC ANTIDEP,URINE: NEGATIVE
TRICYCLIC ANTIDEPRESSANTS, UR: ABNORMAL
TROPONIN INTERP: ABNORMAL
TROPONIN INTERP: NORMAL
TROPONIN T: ABNORMAL NG/ML
TROPONIN T: NORMAL NG/ML
TROPONIN, HIGH SENSITIVITY: 121 NG/L (ref 0–22)
TROPONIN, HIGH SENSITIVITY: 17 NG/L (ref 0–22)
TROPONIN, HIGH SENSITIVITY: 205 NG/L (ref 0–22)
TROPONIN, HIGH SENSITIVITY: 257 NG/L (ref 0–22)
TROPONIN, HIGH SENSITIVITY: 72 NG/L (ref 0–22)
TURBIDITY: CLEAR
URINE HGB: ABNORMAL
UROBILINOGEN, URINE: NORMAL
VT: 500
VT: 500
WBC # BLD: 13.1 K/UL (ref 3.5–11.3)
WBC # BLD: 9.2 K/UL (ref 3.5–11)
WBC # BLD: ABNORMAL 10*3/UL
WBC # BLD: ABNORMAL 10*3/UL
WBC UA: ABNORMAL /HPF
YEAST: ABNORMAL

## 2020-08-14 PROCEDURE — 4A133B1 MONITORING OF ARTERIAL PRESSURE, PERIPHERAL, PERCUTANEOUS APPROACH: ICD-10-PCS | Performed by: STUDENT IN AN ORGANIZED HEALTH CARE EDUCATION/TRAINING PROGRAM

## 2020-08-14 PROCEDURE — 94002 VENT MGMT INPAT INIT DAY: CPT

## 2020-08-14 PROCEDURE — 82803 BLOOD GASES ANY COMBINATION: CPT

## 2020-08-14 PROCEDURE — 2500000003 HC RX 250 WO HCPCS: Performed by: EMERGENCY MEDICINE

## 2020-08-14 PROCEDURE — 70450 CT HEAD/BRAIN W/O DYE: CPT

## 2020-08-14 PROCEDURE — 2580000003 HC RX 258: Performed by: EMERGENCY MEDICINE

## 2020-08-14 PROCEDURE — 87150 DNA/RNA AMPLIFIED PROBE: CPT

## 2020-08-14 PROCEDURE — 6360000002 HC RX W HCPCS: Performed by: STUDENT IN AN ORGANIZED HEALTH CARE EDUCATION/TRAINING PROGRAM

## 2020-08-14 PROCEDURE — 5A1955Z RESPIRATORY VENTILATION, GREATER THAN 96 CONSECUTIVE HOURS: ICD-10-PCS | Performed by: INTERNAL MEDICINE

## 2020-08-14 PROCEDURE — 2580000003 HC RX 258: Performed by: STUDENT IN AN ORGANIZED HEALTH CARE EDUCATION/TRAINING PROGRAM

## 2020-08-14 PROCEDURE — 37799 UNLISTED PX VASCULAR SURGERY: CPT

## 2020-08-14 PROCEDURE — 87641 MR-STAPH DNA AMP PROBE: CPT

## 2020-08-14 PROCEDURE — 93005 ELECTROCARDIOGRAM TRACING: CPT | Performed by: STUDENT IN AN ORGANIZED HEALTH CARE EDUCATION/TRAINING PROGRAM

## 2020-08-14 PROCEDURE — 94640 AIRWAY INHALATION TREATMENT: CPT

## 2020-08-14 PROCEDURE — 2500000003 HC RX 250 WO HCPCS

## 2020-08-14 PROCEDURE — 82805 BLOOD GASES W/O2 SATURATION: CPT

## 2020-08-14 PROCEDURE — 80053 COMPREHEN METABOLIC PANEL: CPT

## 2020-08-14 PROCEDURE — 0DH67UZ INSERTION OF FEEDING DEVICE INTO STOMACH, VIA NATURAL OR ARTIFICIAL OPENING: ICD-10-PCS | Performed by: INTERNAL MEDICINE

## 2020-08-14 PROCEDURE — 87040 BLOOD CULTURE FOR BACTERIA: CPT

## 2020-08-14 PROCEDURE — 2500000003 HC RX 250 WO HCPCS: Performed by: INTERNAL MEDICINE

## 2020-08-14 PROCEDURE — 6370000000 HC RX 637 (ALT 250 FOR IP): Performed by: STUDENT IN AN ORGANIZED HEALTH CARE EDUCATION/TRAINING PROGRAM

## 2020-08-14 PROCEDURE — 84484 ASSAY OF TROPONIN QUANT: CPT

## 2020-08-14 PROCEDURE — 82550 ASSAY OF CK (CPK): CPT

## 2020-08-14 PROCEDURE — 80307 DRUG TEST PRSMV CHEM ANLYZR: CPT

## 2020-08-14 PROCEDURE — 87070 CULTURE OTHR SPECIMN AEROBIC: CPT

## 2020-08-14 PROCEDURE — 2500000003 HC RX 250 WO HCPCS: Performed by: STUDENT IN AN ORGANIZED HEALTH CARE EDUCATION/TRAINING PROGRAM

## 2020-08-14 PROCEDURE — 2580000003 HC RX 258: Performed by: INTERNAL MEDICINE

## 2020-08-14 PROCEDURE — 2000000000 HC ICU R&B

## 2020-08-14 PROCEDURE — 36556 INSERT NON-TUNNEL CV CATH: CPT

## 2020-08-14 PROCEDURE — 94761 N-INVAS EAR/PLS OXIMETRY MLT: CPT

## 2020-08-14 PROCEDURE — 82947 ASSAY GLUCOSE BLOOD QUANT: CPT

## 2020-08-14 PROCEDURE — 93306 TTE W/DOPPLER COMPLETE: CPT

## 2020-08-14 PROCEDURE — 6360000002 HC RX W HCPCS

## 2020-08-14 PROCEDURE — 94003 VENT MGMT INPAT SUBQ DAY: CPT

## 2020-08-14 PROCEDURE — 94770 HC ETCO2 MONITOR DAILY: CPT

## 2020-08-14 PROCEDURE — 84100 ASSAY OF PHOSPHORUS: CPT

## 2020-08-14 PROCEDURE — 85025 COMPLETE CBC W/AUTO DIFF WBC: CPT

## 2020-08-14 PROCEDURE — 71045 X-RAY EXAM CHEST 1 VIEW: CPT

## 2020-08-14 PROCEDURE — 36600 WITHDRAWAL OF ARTERIAL BLOOD: CPT

## 2020-08-14 PROCEDURE — 96376 TX/PRO/DX INJ SAME DRUG ADON: CPT

## 2020-08-14 PROCEDURE — 99291 CRITICAL CARE FIRST HOUR: CPT | Performed by: INTERNAL MEDICINE

## 2020-08-14 PROCEDURE — 0BH17EZ INSERTION OF ENDOTRACHEAL AIRWAY INTO TRACHEA, VIA NATURAL OR ARTIFICIAL OPENING: ICD-10-PCS | Performed by: INTERNAL MEDICINE

## 2020-08-14 PROCEDURE — 36620 INSERTION CATHETER ARTERY: CPT

## 2020-08-14 PROCEDURE — 36415 COLL VENOUS BLD VENIPUNCTURE: CPT

## 2020-08-14 PROCEDURE — 87186 SC STD MICRODIL/AGAR DIL: CPT

## 2020-08-14 PROCEDURE — 87077 CULTURE AEROBIC IDENTIFY: CPT

## 2020-08-14 PROCEDURE — 83605 ASSAY OF LACTIC ACID: CPT

## 2020-08-14 PROCEDURE — 36620 INSERTION CATHETER ARTERY: CPT | Performed by: INTERNAL MEDICINE

## 2020-08-14 PROCEDURE — 93005 ELECTROCARDIOGRAM TRACING: CPT | Performed by: EMERGENCY MEDICINE

## 2020-08-14 PROCEDURE — 87205 SMEAR GRAM STAIN: CPT

## 2020-08-14 PROCEDURE — 83735 ASSAY OF MAGNESIUM: CPT

## 2020-08-14 PROCEDURE — 82330 ASSAY OF CALCIUM: CPT

## 2020-08-14 PROCEDURE — 96374 THER/PROPH/DIAG INJ IV PUSH: CPT

## 2020-08-14 PROCEDURE — 4A133J1 MONITORING OF ARTERIAL PULSE, PERIPHERAL, PERCUTANEOUS APPROACH: ICD-10-PCS | Performed by: STUDENT IN AN ORGANIZED HEALTH CARE EDUCATION/TRAINING PROGRAM

## 2020-08-14 PROCEDURE — 85730 THROMBOPLASTIN TIME PARTIAL: CPT

## 2020-08-14 PROCEDURE — 3E0G76Z INTRODUCTION OF NUTRITIONAL SUBSTANCE INTO UPPER GI, VIA NATURAL OR ARTIFICIAL OPENING: ICD-10-PCS | Performed by: INTERNAL MEDICINE

## 2020-08-14 PROCEDURE — 6360000002 HC RX W HCPCS: Performed by: EMERGENCY MEDICINE

## 2020-08-14 PROCEDURE — 6360000004 HC RX CONTRAST MEDICATION: Performed by: EMERGENCY MEDICINE

## 2020-08-14 PROCEDURE — 2700000000 HC OXYGEN THERAPY PER DAY

## 2020-08-14 PROCEDURE — 36556 INSERT NON-TUNNEL CV CATH: CPT | Performed by: INTERNAL MEDICINE

## 2020-08-14 PROCEDURE — 81001 URINALYSIS AUTO W/SCOPE: CPT

## 2020-08-14 PROCEDURE — 96375 TX/PRO/DX INJ NEW DRUG ADDON: CPT

## 2020-08-14 PROCEDURE — 71260 CT THORAX DX C+: CPT

## 2020-08-14 RX ORDER — PROPOFOL 10 MG/ML
10 INJECTION, EMULSION INTRAVENOUS CONTINUOUS
Status: DISCONTINUED | OUTPATIENT
Start: 2020-08-14 | End: 2020-08-14 | Stop reason: HOSPADM

## 2020-08-14 RX ORDER — POLYETHYLENE GLYCOL 3350 17 G/17G
17 POWDER, FOR SOLUTION ORAL DAILY PRN
Status: DISCONTINUED | OUTPATIENT
Start: 2020-08-14 | End: 2020-08-26 | Stop reason: HOSPADM

## 2020-08-14 RX ORDER — MIDAZOLAM HYDROCHLORIDE 1 MG/ML
4 INJECTION INTRAMUSCULAR; INTRAVENOUS ONCE
Status: COMPLETED | OUTPATIENT
Start: 2020-08-14 | End: 2020-08-14

## 2020-08-14 RX ORDER — PROMETHAZINE HYDROCHLORIDE 25 MG/1
12.5 TABLET ORAL EVERY 6 HOURS PRN
Status: DISCONTINUED | OUTPATIENT
Start: 2020-08-14 | End: 2020-08-26 | Stop reason: HOSPADM

## 2020-08-14 RX ORDER — SODIUM CHLORIDE 0.9 % (FLUSH) 0.9 %
10 SYRINGE (ML) INJECTION PRN
Status: DISCONTINUED | OUTPATIENT
Start: 2020-08-14 | End: 2020-08-26 | Stop reason: HOSPADM

## 2020-08-14 RX ORDER — FENTANYL CITRATE 50 UG/ML
INJECTION, SOLUTION INTRAMUSCULAR; INTRAVENOUS
Status: COMPLETED
Start: 2020-08-14 | End: 2020-08-14

## 2020-08-14 RX ORDER — MINERAL OIL AND WHITE PETROLATUM 150; 830 MG/G; MG/G
OINTMENT OPHTHALMIC PRN
Status: DISCONTINUED | OUTPATIENT
Start: 2020-08-14 | End: 2020-08-26 | Stop reason: HOSPADM

## 2020-08-14 RX ORDER — ONDANSETRON 2 MG/ML
4 INJECTION INTRAMUSCULAR; INTRAVENOUS EVERY 6 HOURS PRN
Status: DISCONTINUED | OUTPATIENT
Start: 2020-08-14 | End: 2020-08-14

## 2020-08-14 RX ORDER — VECURONIUM BROMIDE 1 MG/ML
INJECTION, POWDER, LYOPHILIZED, FOR SOLUTION INTRAVENOUS
Status: DISCONTINUED
Start: 2020-08-14 | End: 2020-08-14 | Stop reason: HOSPADM

## 2020-08-14 RX ORDER — HEPARIN SODIUM 10000 [USP'U]/100ML
12 INJECTION, SOLUTION INTRAVENOUS CONTINUOUS
Status: DISCONTINUED | OUTPATIENT
Start: 2020-08-14 | End: 2020-08-15

## 2020-08-14 RX ORDER — SODIUM CHLORIDE 0.9 % (FLUSH) 0.9 %
10 SYRINGE (ML) INJECTION EVERY 12 HOURS SCHEDULED
Status: DISCONTINUED | OUTPATIENT
Start: 2020-08-14 | End: 2020-08-26 | Stop reason: HOSPADM

## 2020-08-14 RX ORDER — HEPARIN SODIUM 1000 [USP'U]/ML
30 INJECTION, SOLUTION INTRAVENOUS; SUBCUTANEOUS PRN
Status: DISCONTINUED | OUTPATIENT
Start: 2020-08-14 | End: 2020-08-15

## 2020-08-14 RX ORDER — PROMETHAZINE HYDROCHLORIDE 25 MG/1
12.5 TABLET ORAL EVERY 6 HOURS PRN
Status: DISCONTINUED | OUTPATIENT
Start: 2020-08-14 | End: 2020-08-14

## 2020-08-14 RX ORDER — NOREPINEPHRINE BIT/0.9 % NACL 16MG/250ML
2 INFUSION BOTTLE (ML) INTRAVENOUS CONTINUOUS
Status: DISCONTINUED | OUTPATIENT
Start: 2020-08-14 | End: 2020-08-16

## 2020-08-14 RX ORDER — HEPARIN SODIUM 1000 [USP'U]/ML
60 INJECTION, SOLUTION INTRAVENOUS; SUBCUTANEOUS PRN
Status: DISCONTINUED | OUTPATIENT
Start: 2020-08-14 | End: 2020-08-15

## 2020-08-14 RX ORDER — 0.9 % SODIUM CHLORIDE 0.9 %
80 INTRAVENOUS SOLUTION INTRAVENOUS ONCE
Status: COMPLETED | OUTPATIENT
Start: 2020-08-14 | End: 2020-08-14

## 2020-08-14 RX ORDER — CALCIUM GLUCONATE 20 MG/ML
1 INJECTION, SOLUTION INTRAVENOUS ONCE
Status: COMPLETED | OUTPATIENT
Start: 2020-08-14 | End: 2020-08-14

## 2020-08-14 RX ORDER — VECURONIUM BROMIDE 1 MG/ML
10 INJECTION, POWDER, LYOPHILIZED, FOR SOLUTION INTRAVENOUS ONCE
Status: COMPLETED | OUTPATIENT
Start: 2020-08-14 | End: 2020-08-14

## 2020-08-14 RX ORDER — ACETAMINOPHEN 650 MG/1
650 SUPPOSITORY RECTAL EVERY 6 HOURS PRN
Status: DISCONTINUED | OUTPATIENT
Start: 2020-08-14 | End: 2020-08-14

## 2020-08-14 RX ORDER — ONDANSETRON 2 MG/ML
4 INJECTION INTRAMUSCULAR; INTRAVENOUS EVERY 6 HOURS PRN
Status: DISCONTINUED | OUTPATIENT
Start: 2020-08-14 | End: 2020-08-26 | Stop reason: HOSPADM

## 2020-08-14 RX ORDER — 0.9 % SODIUM CHLORIDE 0.9 %
1000 INTRAVENOUS SOLUTION INTRAVENOUS ONCE
Status: COMPLETED | OUTPATIENT
Start: 2020-08-14 | End: 2020-08-14

## 2020-08-14 RX ORDER — SODIUM CHLORIDE 0.9 % (FLUSH) 0.9 %
10 SYRINGE (ML) INJECTION PRN
Status: DISCONTINUED | OUTPATIENT
Start: 2020-08-14 | End: 2020-08-14 | Stop reason: HOSPADM

## 2020-08-14 RX ORDER — FENTANYL CITRATE 50 UG/ML
100 INJECTION, SOLUTION INTRAMUSCULAR; INTRAVENOUS ONCE
Status: COMPLETED | OUTPATIENT
Start: 2020-08-14 | End: 2020-08-14

## 2020-08-14 RX ORDER — ACETAMINOPHEN 325 MG/1
650 TABLET ORAL EVERY 6 HOURS PRN
Status: DISCONTINUED | OUTPATIENT
Start: 2020-08-14 | End: 2020-08-26 | Stop reason: HOSPADM

## 2020-08-14 RX ORDER — FENTANYL CITRATE 50 UG/ML
INJECTION, SOLUTION INTRAMUSCULAR; INTRAVENOUS
Status: DISCONTINUED
Start: 2020-08-14 | End: 2020-08-14 | Stop reason: HOSPADM

## 2020-08-14 RX ORDER — ACETAMINOPHEN 650 MG/1
650 SUPPOSITORY RECTAL EVERY 6 HOURS PRN
Status: DISCONTINUED | OUTPATIENT
Start: 2020-08-14 | End: 2020-08-26 | Stop reason: HOSPADM

## 2020-08-14 RX ORDER — ACETAMINOPHEN 325 MG/1
650 TABLET ORAL EVERY 6 HOURS PRN
Status: DISCONTINUED | OUTPATIENT
Start: 2020-08-14 | End: 2020-08-14

## 2020-08-14 RX ORDER — ALBUTEROL SULFATE 2.5 MG/3ML
2.5 SOLUTION RESPIRATORY (INHALATION)
Status: DISCONTINUED | OUTPATIENT
Start: 2020-08-14 | End: 2020-08-26 | Stop reason: HOSPADM

## 2020-08-14 RX ORDER — MIDAZOLAM HYDROCHLORIDE 1 MG/ML
INJECTION INTRAMUSCULAR; INTRAVENOUS DAILY PRN
Status: DISCONTINUED | OUTPATIENT
Start: 2020-08-14 | End: 2020-08-14

## 2020-08-14 RX ORDER — HEPARIN SODIUM 1000 [USP'U]/ML
60 INJECTION, SOLUTION INTRAVENOUS; SUBCUTANEOUS ONCE
Status: COMPLETED | OUTPATIENT
Start: 2020-08-14 | End: 2020-08-14

## 2020-08-14 RX ORDER — IPRATROPIUM BROMIDE AND ALBUTEROL SULFATE 2.5; .5 MG/3ML; MG/3ML
1 SOLUTION RESPIRATORY (INHALATION) EVERY 4 HOURS PRN
Status: DISCONTINUED | OUTPATIENT
Start: 2020-08-14 | End: 2020-08-21

## 2020-08-14 RX ORDER — POLYETHYLENE GLYCOL 3350 17 G/17G
17 POWDER, FOR SOLUTION ORAL DAILY PRN
Status: DISCONTINUED | OUTPATIENT
Start: 2020-08-14 | End: 2020-08-14

## 2020-08-14 RX ORDER — PROPOFOL 10 MG/ML
10 INJECTION, EMULSION INTRAVENOUS ONCE
Status: COMPLETED | OUTPATIENT
Start: 2020-08-14 | End: 2020-08-14

## 2020-08-14 RX ORDER — FENTANYL CITRATE 50 UG/ML
100 INJECTION, SOLUTION INTRAMUSCULAR; INTRAVENOUS EVERY 30 MIN PRN
Status: DISCONTINUED | OUTPATIENT
Start: 2020-08-14 | End: 2020-08-14 | Stop reason: HOSPADM

## 2020-08-14 RX ORDER — SODIUM CHLORIDE 9 MG/ML
INJECTION, SOLUTION INTRAVENOUS CONTINUOUS
Status: DISCONTINUED | OUTPATIENT
Start: 2020-08-14 | End: 2020-08-17

## 2020-08-14 RX ADMIN — FAMOTIDINE 20 MG: 10 INJECTION, SOLUTION INTRAVENOUS at 20:06

## 2020-08-14 RX ADMIN — SODIUM CHLORIDE 2 MCG/KG/MIN: 9 INJECTION, SOLUTION INTRAVENOUS at 11:37

## 2020-08-14 RX ADMIN — PROPOFOL 40 MG: 10 INJECTION, EMULSION INTRAVENOUS at 01:28

## 2020-08-14 RX ADMIN — Medication 100 MG: at 20:06

## 2020-08-14 RX ADMIN — NOREPINEPHRINE BITARTRATE 2 MCG/MIN: 1 INJECTION, SOLUTION, CONCENTRATE INTRAVENOUS at 02:25

## 2020-08-14 RX ADMIN — VECURONIUM BROMIDE 10 MG: 1 INJECTION, POWDER, LYOPHILIZED, FOR SOLUTION INTRAVENOUS at 00:16

## 2020-08-14 RX ADMIN — SODIUM CHLORIDE 1000 ML: 9 INJECTION, SOLUTION INTRAVENOUS at 02:15

## 2020-08-14 RX ADMIN — Medication 100 MG: at 13:30

## 2020-08-14 RX ADMIN — FENTANYL CITRATE 100 MCG: 50 INJECTION, SOLUTION INTRAMUSCULAR; INTRAVENOUS at 03:12

## 2020-08-14 RX ADMIN — Medication 10 ML: at 01:16

## 2020-08-14 RX ADMIN — FENTANYL CITRATE 100 MCG: 50 INJECTION, SOLUTION INTRAMUSCULAR; INTRAVENOUS at 10:29

## 2020-08-14 RX ADMIN — HEPARIN SODIUM 1960 UNITS: 1000 INJECTION INTRAVENOUS; SUBCUTANEOUS at 18:46

## 2020-08-14 RX ADMIN — SODIUM CHLORIDE 1.5 G: 900 INJECTION INTRAVENOUS at 16:15

## 2020-08-14 RX ADMIN — Medication 100 MCG/HR: at 11:08

## 2020-08-14 RX ADMIN — IPRATROPIUM BROMIDE AND ALBUTEROL SULFATE 1 AMPULE: .5; 3 SOLUTION RESPIRATORY (INHALATION) at 15:18

## 2020-08-14 RX ADMIN — SODIUM CHLORIDE 1.5 G: 900 INJECTION INTRAVENOUS at 23:09

## 2020-08-14 RX ADMIN — HEPARIN SODIUM 3920 UNITS: 1000 INJECTION INTRAVENOUS; SUBCUTANEOUS at 11:14

## 2020-08-14 RX ADMIN — Medication 8 MG/HR: at 11:59

## 2020-08-14 RX ADMIN — MIDAZOLAM 2 MG: 1 INJECTION INTRAMUSCULAR; INTRAVENOUS at 00:03

## 2020-08-14 RX ADMIN — Medication 10 MG/HR: at 23:13

## 2020-08-14 RX ADMIN — MIDAZOLAM HYDROCHLORIDE 4 MG: 2 INJECTION, SOLUTION INTRAMUSCULAR; INTRAVENOUS at 02:17

## 2020-08-14 RX ADMIN — SODIUM CHLORIDE: 9 INJECTION, SOLUTION INTRAVENOUS at 11:14

## 2020-08-14 RX ADMIN — SODIUM CHLORIDE 1000 ML: 9 INJECTION, SOLUTION INTRAVENOUS at 16:15

## 2020-08-14 RX ADMIN — HEPARIN SODIUM 12 UNITS/KG/HR: 5000 INJECTION, SOLUTION INTRAVENOUS; SUBCUTANEOUS at 11:07

## 2020-08-14 RX ADMIN — FAMOTIDINE 20 MG: 10 INJECTION, SOLUTION INTRAVENOUS at 13:30

## 2020-08-14 RX ADMIN — FENTANYL CITRATE 100 MCG: 50 INJECTION INTRAMUSCULAR; INTRAVENOUS at 02:15

## 2020-08-14 RX ADMIN — SODIUM CHLORIDE 80 ML: 9 INJECTION, SOLUTION INTRAVENOUS at 01:15

## 2020-08-14 RX ADMIN — SODIUM CHLORIDE 1.5 G: 900 INJECTION INTRAVENOUS at 13:31

## 2020-08-14 RX ADMIN — SODIUM CHLORIDE, PRESERVATIVE FREE 10 ML: 5 INJECTION INTRAVENOUS at 20:06

## 2020-08-14 RX ADMIN — IOVERSOL 75 ML: 741 INJECTION INTRA-ARTERIAL; INTRAVENOUS at 01:15

## 2020-08-14 RX ADMIN — CALCIUM GLUCONATE 1 G: 20 INJECTION, SOLUTION INTRAVENOUS at 11:14

## 2020-08-14 RX ADMIN — MIDAZOLAM 6 MG/HR: 5 INJECTION INTRAMUSCULAR; INTRAVENOUS at 03:12

## 2020-08-14 RX ADMIN — Medication 100 MCG/HR: at 18:52

## 2020-08-14 RX ADMIN — MIDAZOLAM 2 MG: 1 INJECTION INTRAMUSCULAR; INTRAVENOUS at 00:02

## 2020-08-14 ASSESSMENT — PAIN SCALES - GENERAL
PAINLEVEL_OUTOF10: 0
PAINLEVEL_OUTOF10: 0

## 2020-08-14 ASSESSMENT — PULMONARY FUNCTION TESTS
PIF_VALUE: 23
PIF_VALUE: 25
PIF_VALUE: 23
PIF_VALUE: 27
PIF_VALUE: 27
PIF_VALUE: 26
PIF_VALUE: 25
PIF_VALUE: 21
PIF_VALUE: 22

## 2020-08-14 NOTE — ED NOTES
RESULTS OF ABGS: PH 7.12, PO2 67.6, PCO2 68.7, HCO3 22.8, BASE -6.4 AND DR. Michael Medina RN  08/14/20 0191

## 2020-08-14 NOTE — PROGRESS NOTES
Comprehensive Nutrition Assessment    Type and Reason for Visit:  Initial    Nutrition Recommendations/Plan: Recommend Pivot (Immune Enhancing) tube feed goal of 50 ml per hour to provide 1800 kcal, 113 g protein    Nutrition Assessment:  Pt admitted for possible OD. Now in ARDS. Chart reviewed due to vent. Malnutrition Assessment:  Malnutrition Status: At risk for malnutrition (Comment)    Context:  Acute Illness     Findings of the 6 clinical characteristics of malnutrition:  Energy Intake:  Unable to assess  Weight Loss:  Unable to assess     Body Fat Loss:  Unable to assess     Muscle Mass Loss:  Unable to assess    Fluid Accumulation:  No significant fluid accumulation     Strength:  Not Performed    Estimated Daily Nutrient Needs:  Energy (kcal):  2027-0189 kcal (28-30 kcal/kg);  Weight Used for Energy Requirements:  Current     Protein (g):  1.2g/kg=70 g; Weight Used for Protein Requirements:  Ideal          Current Nutrition Therapies:    Diet NPO Effective Now    Anthropometric Measures:  · Height: 5' 4\" (162.6 cm)  · Current Body Weight: 144 lb (65.3 kg)   · Admission Body Weight:      · Usual Body Weight:       · Ideal Body Weight: 130 lbs; % Ideal Body Weight     · BMI: 24.7  · Adjusted Body Weight:  ;     · Adjusted BMI:      · BMI Categories:         Nutrition Diagnosis:   · Inadequate oral intake related to impaired respiratory funtion as evidenced by NPO or clear liquid status due to medical condition    Nutrition Interventions:   Food and/or Nutrient Delivery:  Start Tube Feeding  Nutrition Education/Counseling:  No recommendation at this time   Coordination of Nutrition Care:  No recommendation at this time    Goals:  provide greater than 75% of nutrition needs       Nutrition Monitoring and Evaluation:   Behavioral-Environmental Outcomes:      Food/Nutrient Intake Outcomes:  (initiation of nutrition)  Physical Signs/Symptoms Outcomes:  Biochemical Data, GI Status, Skin     Discharge Planning:     Too soon to determine     Electronically signed by Veda Corrigan RD, LD on 8/14/20 at 2:58 PM EDT    Contact: [phone]

## 2020-08-14 NOTE — CONSULTS
Attestation signed by      Attending Physician Statement:    I have discussed the care of  London Siegel , including pertinent history and exam findings, with the Cardiology fellow/resident. I have seen and examined the patient and the key elements of all parts of the encounter have been performed by me. I agree with the assessment, plan and orders as documented by the fellow/resident, after I modified exam findings and plan of treatments, and the final version is my approved version of the assessment. Additional Comments: Will start Heparin , trend Trop   Echo for LVEF , wall motion abnormalities     Further cardiac work up once improves from current condition  Dr. Elvin Serrato Cardiology Cardiology    Consult / H&P               Today's Date: 8/14/2020  Patient Name: London Siegel  Date of admission: 8/14/2020  5:18 AM  Patient's age: 38 y.o., 1985  Admission Dx: Acute respiratory failure with hypoxia ([facility] [address] [J96.01]    Reason for Consult:  Cardiac evaluation    Requesting Physician: Maritza Aguilar MD    CHIEF COMPLAINT:  Altered mentation    History Obtained From:  electronic medical record    HISTORY OF PRESENT ILLNESS:      The patient is a 38 y.o.  male who is admitted to the hospital for altered mental nation. Patient had suspected drug overdose. Was intubated on support, seen to have acute hypoxic respiratory failure with groundglass opacities on the CT chest.  Patient also had hypovolemic shock requiring pressors  Developed acute kidney injury. Cardiology consulted for elevated troponins, initial troponin was 17 which increased to 257. EKG shows new T wave inversions in lead II, III and aVF    Past Medical History:   has a past medical history of Bronchitis, Difficult intubation, Heartburn, Inguinal hernia, and Snores. Past Surgical History:   has a past surgical history that includes Tonsillectomy;  Inguinal hernia repair (Left, 04/13/2018); and pr lap,inguinal hernia repr,initial (Left, 4/13/2018). Home Medications:    Prior to Admission medications    Medication Sig Start Date End Date Taking? Authorizing Provider   docusate sodium (COLACE) 100 MG capsule Take 1 capsule by mouth 2 times daily as needed for Constipation (use while taking narcotic pain meds) 4/13/18   Oscar Manuel,       Current Facility-Administered Medications: famotidine (PEPCID) injection 20 mg, 20 mg, Intravenous, BID  albuterol (PROVENTIL) nebulizer solution 2.5 mg, 2.5 mg, Nebulization, As Directed RT PRN  sodium chloride flush 0.9 % injection 10 mL, 10 mL, Intravenous, 2 times per day  sodium chloride flush 0.9 % injection 10 mL, 10 mL, Intravenous, PRN  midazolam (VERSED) 1 mg/mL in D5W infusion, 1 mg/hr, Intravenous, Continuous  sodium chloride flush 0.9 % injection 10 mL, 10 mL, Intravenous, 2 times per day  sodium chloride flush 0.9 % injection 10 mL, 10 mL, Intravenous, PRN  acetaminophen (TYLENOL) tablet 650 mg, 650 mg, Oral, Q6H PRN **OR** acetaminophen (TYLENOL) suppository 650 mg, 650 mg, Rectal, Q6H PRN  polyethylene glycol (GLYCOLAX) packet 17 g, 17 g, Oral, Daily PRN  promethazine (PHENERGAN) tablet 12.5 mg, 12.5 mg, Oral, Q6H PRN **OR** ondansetron (ZOFRAN) injection 4 mg, 4 mg, Intravenous, Q6H PRN  norepinephrine (LEVOPHED) 16 mg in sodium chloride 0.9 % 250 mL infusion, 2 mcg/min, Intravenous, Continuous  heparin (porcine) injection 3,920 Units, 60 Units/kg, Intravenous, Once  heparin (porcine) injection 3,920 Units, 60 Units/kg, Intravenous, PRN  heparin (porcine) injection 1,960 Units, 30 Units/kg, Intravenous, PRN  heparin 25,000 units in dextrose 5% 250 mL infusion, 12 Units/kg/hr, Intravenous, Continuous    Allergies:  Patient has no known allergies. Social History:   reports that he has been smoking cigarettes. He has a 10.00 pack-year smoking history. He has never used smokeless tobacco. He reports current drug use. Drug: Marijuana. He reports that he does not drink alcohol. Family History: family history includes Cancer in his maternal grandmother; Diabetes in his paternal grandfather and paternal grandmother; No Known Problems in his brother, father, maternal grandfather, mother, and sister. No h/o sudden cardiac death. No for premature CAD    REVIEW OF SYSTEMS:    · Review of system could not be done as patient was intubated      PHYSICAL EXAM:      BP 99/64   Pulse 108   Temp 98.6 °F (37 °C) (Oral)   Resp 23   Ht 5' 4\" (1.626 m)   Wt 144 lb 2.9 oz (65.4 kg)   SpO2 100%   BMI 24.75 kg/m²    Constitutional and General Appearance: Intubated, sedated   HEENT: PERRL, no cervical lymphadenopathy. No masses palpable. Normal oral mucosa  Respiratory:  · Bilateral decreased breath sounds, ET tube in place  Cardiovascular:  · The apical impulse is not displaced  · Heart tones are crisp and normal. regular S1 and S2.  · Jugular venous pulsation Normal  · The carotid upstroke is normal in amplitude and contour without delay or bruit  · Peripheral pulses are symmetrical and full   Abdomen:   · No masses or tenderness  · Bowel sounds present  Extremities:  ·  No Cyanosis or Clubbing  ·  Lower extremity edema: No  ·  Skin: Warm and dry  Neurological:  · Alert and oriented. · Moves all extremities well  · No abnormalities of mood, affect, memory, mentation, or behavior are noted    DATA:    Diagnostics:    EKG: T wave inversions in 2 3 and aVF, incomplete right bundle branch block,     Labs:     CBC:   Recent Labs     08/13/20 2339   WBC 9.2   HGB 15.7   HCT 46.8        BMP:   Recent Labs     08/13/20 2339      K 4.7   CO2 20   BUN 15   CREATININE 1.53*   LABGLOM 51*   GLUCOSE 316*     BNP: No results for input(s): BNP in the last 72 hours.   PT/INR:   Recent Labs     08/13/20 2339   PROTIME 14.2   INR 1.1     APTT:  Recent Labs     08/13/20 2339   APTT 26.2     CARDIAC ENZYMES:No results for input(s): CKTOTAL, CKMB, CKMBINDEX, TROPONINI in the last 72 hours.  FASTING LIPID PANEL:No results found for: HDL, LDLDIRECT, LDLCALC, TRIG  LIVER PROFILE:  Recent Labs     08/13/20  2339   AST 58*   ALT 58*   LABALBU 3.8       IMPRESSION:    Altered mental status  Acute respiratory failure status post intubation  ARDS  Suspected drug overdose  Hypotension on pressor support  LIZABETH  NSTEMI, troponins elevated from 17 to 257  Cocaine abuse    RECOMMENDATIONS:  1. Trend troponins  2. Will consider heparin drip for NSTEMI, T wave inversion changes in II, III and aVF although troponin elevation can be likely sec to cocaine abuse  3. Bedside echo  4. Repeat urine and blood tox    Discussed with Nurse.     Electronically signed by Anya Gallegos MD on 8/14/2020 at 1201 [facility]      [phone]

## 2020-08-14 NOTE — PLAN OF CARE
Problem: OXYGENATION/RESPIRATORY FUNCTION  Goal: Patient will maintain patent airway  8/14/2020 1531 by Ivy Tirado RCP  Outcome: Ongoing     Problem: OXYGENATION/RESPIRATORY FUNCTION  Goal: Patient will achieve/maintain normal respiratory rate/effort  Description: Respiratory rate and effort will be within normal limits for the patient  8/14/2020 1531 by Ivy Tirado RCP  Outcome: Ongoing     Problem: MECHANICAL VENTILATION  Goal: Patient will maintain patent airway  8/14/2020 1531 by Ivy Tirado RCP  Outcome: Ongoing     Problem: MECHANICAL VENTILATION  Goal: Oral health is maintained or improved  8/14/2020 1531 by Ivy Tirado RCP  Outcome: Ongoing     Problem: MECHANICAL VENTILATION  Goal: ET tube will be managed safely  8/14/2020 1531 by Ivy Tirado RCP  Outcome: Ongoing     Problem: MECHANICAL VENTILATION  Goal: Ability to express needs and understand communication  8/14/2020 1531 by Ivy Tirado RCP  Outcome: Ongoing     Problem: MECHANICAL VENTILATION  Goal: Mobility/activity is maintained at optimum level for patient  8/14/2020 1531 by Ivy Tirado RCP  Outcome: Ongoing     Problem: SKIN INTEGRITY  Goal: Skin integrity is maintained or improved  8/14/2020 1531 by Ivy Tirado RCP  Outcome: Ongoing    Ventilator Bronchodilator assessment    Breath sounds: clear diminished upper lung fields; diminished middle and lower lung fields  Inspiratory Pressure: 23  Plateau Pressure: 22    Patient assessed at level 1          []    Bronchodilator Assessment    BRONCHODILATOR ASSESSMENT SCORE  Score 0 (Home) 1 2 3 4   Breath Sounds   []  Chronic Ventilator: Patient at baseline [x]  Mild Wheezes/ Clear []  Intermittent wheezes with good air entry []  Bilateral/unilateral wheezing with diminished air entry []  Insp/Exp wheeze and/or poor aeration   Ventilator Pressures   []  Chronic Ventilator []  Insp. Pressure less than 25 cm H20 []  Insp. Pressure less than 25 cm H20 []  Insp.  Pressure exceeds 25

## 2020-08-14 NOTE — PROGRESS NOTES
08/14/20 0546   Vent Information   FiO2  70 %   PEEP/CPAP 12   pt sats 88%, increased settings, pt tolerating well, 98%, RN at bedside

## 2020-08-14 NOTE — PROCEDURES
PROCEDURE NOTE - ARTERIAL LINE PLACEMENT    PATIENT NAME: Srikanth RECORD NO. [id number]  DATE: 8/14/2020  ATTENDING PHYSICIAN:  megan      PREOPERATIVE DIAGNOSIS:  Need for blood pressure monitoring due to shock  POSTOPERATIVE DIAGNOSIS:  Same  PROCEDURE PERFORMED: Right Radial Arterial Line Insertion  PERFORMING PHYSICIAN:  Stewart Rosen. DO  ESTIMATED BLOOD LOSS:  Less than 25 ml  COMPLICATIONS:  None immediately appreciated. DISCUSSION:  Mertha Gowers is a 38 y.o. male who requires invasive pressure monitoring. The history and physical examination were reviewed and confirmed. CONSENT: Unable to be obtained due to the emergent nature of this procedure. PROCEDURE:  A timeout was initiated by the bedside nurse and was confirmed by those present. The patient was placed in a supine position. The skin overlying the Right Radial was prepped with chlorhexadine. Through this region, the introducer needle through catheter was inserted into radial artery until pulsatile bright blood was seen in collection tubing. Guidewire was advanced with no resistance. Catheter was advanced into the artery, wire was pulled with brisk bleeding noted. Pressure monitoring setup was connected to the catheter, it aspirated and flushed easily. The catheter was secured to the wrist with 3-0 silk. No immediate complication was evident. All sponge, instrument and needle counts were correct at the completion of the procedure. Vito Ruiz MS, RD  PGY-2 Emergency Medicine  8/14/2020  2:04 PM    Attending Physician Statement  I have discussed the care of Mertha Gowers, including pertinent history and exam findings,  with the resident. I have seen and examined the patient and the key elements of all parts of the encounter have been performed by me. I agree with the assessment, plan and orders as documented by the resident with additions .       Treatment plan Discussed with nursing staff in detail , all questions answered . Electronically signed by Aracely Richey MD on   8/14/20 at 6:51 PM EDT    Please note that this chart was generated using voice recognition Dragon dictation software. Although every effort was made to ensure the accuracy of this automated transcription, some errors in transcription may have occurred.

## 2020-08-14 NOTE — PROGRESS NOTES
Ventilator Bronchodilator assessment    Breath sounds: Clear   Inspiratory Pressure: 27  Plateau Pressure: 22    Patient assessed at level 1          [x]    Bronchodilator Assessment    BRONCHODILATOR ASSESSMENT SCORE  Score 0 (Home) 1 2 3 4   Breath Sounds   []  Chronic Ventilator: Patient at baseline [x]  Mild Wheezes/ Clear []  Intermittent wheezes with good air entry []  Bilateral/unilateral wheezing with diminished air entry []  Insp/Exp wheeze and/or poor aeration   Ventilator Pressures   []  Chronic Ventilator []  Insp. Pressure less than 25 cm H20 []  Insp. Pressure less than 25 cm H20 [x]  Insp. Pressure exceeds 25 cm H20 []  Insp.  Pressure exceeds 30 cm H20   Plateau Pressure []  NA   []  Plateau Pressure less than 4  [x]  Plateau Pressure less than or equal to 5 []  Plateau Pressure greater than or equal to 6 []  Plateau Pressure greater than or equal to 8       [employer]  5:30 AM

## 2020-08-14 NOTE — CARE COORDINATION
Case Management Initial Discharge Plan  Dl Rahman,             Met with:Called mom to discuss discharge plans. Information verified: address, contacts, phone number, , insurance Yes    Emergency Contact/Next of Kin name & number: Carlin Awan    PCP: No primary care provider on file. Date of last visit: no pcp    Insurance Provider: no insurance, Steele in HELP and left vm he does not has insurance. Discharge Planning    Living Arrangements:  Parent(or girlfriend)   Support Systems:  Parent    Home has 1 stories  3 stairs to climb to get into front door, n/a stairs to climb to reach second floor  Location of bedroom/bathroom in home main at moms house    Patient able to perform ADL's:Independent    Current Services (outpatient & in home) none  DME equipment: none  DME provider: n/a    Receiving oral anticoagulation therapy? No    If indicated:   Physician managing anticoagulation treatment: n/a  Where does patient obtain lab work for ATC treatment? n/a      Potential Assistance Needed:  N/A    Patient agreeable to home care: No  [place] of choice provided:  n/a    Prior SNF/Rehab Placement and Facility: none  Agreeable to SNF/Rehab: No  [place] of choice provided: n/a     Evaluation: n/a    Expected Discharge date:       Patient expects to be discharged to: Follow Up Appointment: Best Day/ Time:      Transportation provider: mom   Transportation arrangements needed for discharge: Yes    Readmission Risk              Risk of Unplanned Readmission:        12             Does patient have a readmission risk score greater than 14?: No  If yes, follow-up appointment must be made within 7 days of discharge. Goals of Care: improve respiratory function      Discharge Plan: drug overdose, is intubated, sedated, and paralyzed, monitor for discharge needs.            Electronically signed by Ese Mayen RN on 20 at 4:37 PM EDT

## 2020-08-14 NOTE — PLAN OF CARE
ABG  5.15 showed pH 7.068/pCO2 107.3, pO2 160.9/HCO3 30.9  PRVC 32/370/14/55%  Peak pressure 23  Plateau 21  Pt has air trapping with Vee 8    RR decreased to 26, TV increased to 8mL (490mL)    F/U ABG on PRVC 26/490/14/55%  PH 7.191/CO2 75.6/O2 194.5/ 28.9    Plan:  Continue same vent settings  Repeat ABG in 3 hrs

## 2020-08-14 NOTE — PROGRESS NOTES
Physician Progress Note      PATIENT:               Donny VALENZUELA #:                  [id number]  :                       1985  ADMIT DATE:       2020 5:18 AM  DISCH DATE:  RESPONDING  PROVIDER #:        Brian Hardy MD          QUERY TEXT:    Dear Dr. Nayan Owens and Residents-    Pt admitted with drug over dose. Pt noted to have AMS. If possible, please   document in the progress notes and discharge summary if you are evaluating and   / or treating any of the following: The medical record reflects the following:  Risk Factors: acute respiratory failure, suspected drug overdose, LIZABETH,   hypovolemic shock, cocaine abuse  Clinical Indicators: urine toxic screen - positive for cannabis, AMS at OSH,   SOB, generalized malaise form pt reports on chart  Treatment: intubation upon transfer with mechanical ventilation,  transfer to   higher level of care for evaluation and tx. If you are in need of further assistance, or have a question, you can contact   me at cell -[phone] - [facility]- [phone]. I am usually on line from    6:30 - 3:00. Thank Jailyn Ann RN, CDI  Options provided:  -- Toxic encephalopathy  -- Encephalopathy due to medications or drugs, Please specify  -- Anoxic encephalopathy  -- Metabolic encephalopathy  -- Other - I will add my own diagnosis  -- Disagree - Not applicable / Not valid  -- Disagree - Clinically unable to determine / Unknown  -- Refer to Clinical Documentation Reviewer    PROVIDER RESPONSE TEXT:    This patient has toxic encephalopathy.     Query created by: Klarissa Barnes on 2020 11:16 AM      Electronically signed by:  Brian Hardy MD 2020 6:50 PM

## 2020-08-14 NOTE — PROCEDURES
Damian Hartman is a 38 y.o. male patient. 1. Acute respiratory failure with hypoxia (HCC)      Past Medical History:   Diagnosis Date    Bronchitis     in the past    Difficult intubation     ? per mother at age 6, states \" flap very big \"    Heartburn     diet controlled    Inguinal hernia     left    Snores      Blood pressure 101/71, pulse 138, temperature 98.8 °F (37.1 °C), temperature source Axillary, resp. rate 26, height 5' 4\" (1.626 m), weight 144 lb 2.9 oz (65.4 kg), SpO2 96 %.     Procedures    Mannie Hashimoto, MD  8/14/2020

## 2020-08-14 NOTE — ED PROVIDER NOTES
states that he has not stated he doesn't want to die today      On exam, patient is in acute respiratory distress. He is pale and diaphoretic. Heart sounds are tachycardic, and lungs have diffuse crackles throughout all lung fields. Abdomen soft nontender. Patient is alert and answering questions, but is very fatigued. Moving all extremities. MDM/Plan: Acute respiratory distress status post drug overdose and Narcan administration. Patient is only saturating in the upper 70s despite being on nonrebreather. He is very tachycardic and tachypneic. Decision was made immediately to intubate given the fact he was saturating in the 70s despite being on nonrebreather. Intubated by resident, see procedure note below. Patient had difficulty getting his saturations up, but he was able to confirm to by rechecking with the glide scope. Patient was on propofol initially, but became very alert and aggressive. Was given paralytic to get through CT scans. Acidotic. CoVID-19 negative. CT scan shows bilateral pulmonary edema with large. This is likely due to pulmonary hemorrhage and post Narcan pulmonary edema. No signs of infection. Of note, patient had stated prior to admission he is not had any cough or fever. He is a normal white count. Not believe that this is infectious in etiology and therefore antibiotics were not started at this time. Patient was having difficulty with sedation. Was initially hy He was on 30 mics of propofol, however he was hypotensive despite being on levo fed. This has been initially stopped by nursing staff however, patient became very awake and combative. We started the propofol at half of 15, Versed drip was added on with max of 12. Given fentanyl. Patient is now sedated. We will continue to try to wean down the Levophed first and then the propofol.   Initially was planning on trying to avoid Versed however given that he responds this better than propofol continue to use.    2:49 - Spoke to Dr. Kimi Tabor who accepted the admission at this time to [facility] ICU. Patient was transferred due to high acuity of care, and the fact that we do not have any ICU beds available at our facility at this time. 3:00  -patient's troponin is elevated at 205. Repeat EKG shows T wave inversions in lead III, aVF, and lead II that were not there deviously. Repeated the EKG again, T wave inversions are still present. 4:32 Spoke to Dr. Trixie Johnson at [facility] with cardiology. Aware that patient is being transferred over. EKG Interpretation # 1    Interpreted by emergency department physician    Rhythm: sinus tachycardia  Rate: normal  Axis: normal  Ectopy: none  Conduction: right bundle branch block (incomplete)  ST Segments: no acute change  T Waves: non specific changes  Q Waves: nonspecific    Clinical Impression: Tachycardia with left atrial enlargement and incomplete right bundle branch block. No significant change when [facility]    EKG Interpretation # 2 @3:20    Interpreted by emergency department physician    Rhythm: sinus tachycardia  Rate: tachycardia  Axis: left  Ectopy: none  Conduction: normal  ST Segments: nonspecific changes, mild elevations at less than 1 mm in V2. T Waves:New T wave inversions in II, III, aVF  Q Waves: none    Clinical Impression: Nonspecific EKG, T wave inversions are new compared to EKG done previously. Prudencio Schirmer     EKG Interpretation # 2 @3:42    Interpreted by emergency department physician    Rhythm: sinus tachycardia  Rate: tachycardia  Axis: left  Ectopy: none  Conduction: normal  ST Segments: nonspecific changes, mild elevations at less than 1 mm in V2. T Waves:New T wave inversions in II, III, aVF  Q Waves: none    Clinical Impression: Nonspecific EKG, T wave inversions persistent.      Prudencio Schirmer      CRITICAL CARE: There was significant risk of life threatening deterioration of patient's condition requiring my direct management. Critical care time 90 minutes, excluding any documented procedures.       Angelina Leach MD  Attending Emergency Physician        Angelina Leach MD  08/16/20 [id number]

## 2020-08-14 NOTE — CARE COORDINATION
Patient was intubated with a 7.5 ET tube under glide scope. Easy Cap changed color immediately with coarse bilateral breath sounds. ET tube was secured at 25 cm at the lip. Fresh Red Blood suctioned from his ET tube.  Patient requiring heavy sedation to obtain his ABG in the Right Radial Arteryl    Alexus May RRT

## 2020-08-14 NOTE — H&P
anicteric, oropharynx clear, no lesions, neck supple with midline trachea. Neck: Supple, symmetrical, trachea midline, no adenopathy, thyroid symmetric, no jvd skin normal  Respiratory: clear to auscultation, no wheezes or rales and unlabored breathing. No intercostal tenderness  Cardiovascular: regular rate and rhythm, normal S1, S2, no murmur noted and 2+ pulses throughout  Abdomen: soft, nontender, nondistended, no masses or organomegaly  Neurological:  Extremities:  peripheral pulses normal, no pedal edema, no clubbing or cyanosis      Laboratory findings:-    CBC:   Recent Labs     08/13/20 2339   WBC 9.2   HGB 15.7        BMP:    Recent Labs     08/13/20 2339      K 4.7      CO2 20   BUN 15   CREATININE 1.53*   GLUCOSE 316*     S. Calcium:  Recent Labs     08/13/20 2339   CALCIUM 8.1*     S. Ionized Calcium:No results for input(s): IONCA in the last 72 hours. S. Magnesium:No results for input(s): MG in the last 72 hours. S. Phosphorus:No results for input(s): PHOS in the last 72 hours. S. Glucose:No results for input(s): POCGLU in the last 72 hours. Glycosylated hemoglobin A1C: No results for input(s): LABA1C in the last 72 hours. INR:   Recent Labs     08/13/20 2339   INR 1.1     Hepatic functions:   Recent Labs     08/13/20 2339   ALKPHOS 79   ALT 58*   AST 58*   PROT 6.4   BILITOT 0.24*   LABALBU 3.8     Pancreatic functions:No results for input(s): LACTA, AMYLASE in the last 72 hours. S. Lactic Acid: No results for input(s): LACTA in the last 72 hours. Cardiac enzymes:No results for input(s): CKTOTAL, CKMB, CKMBINDEX, TROPONINI in the last 72 hours. BNP:No results for input(s): BNP in the last 72 hours. Lipid profile: No results for input(s): CHOL, TRIG, HDL, LDLCALC in the last 72 hours.     Invalid input(s): LDL  Blood Gases: No results found for: PH, PCO2, PO2, HCO3, O2SAT  Thyroid functions: No results found for: TSH     Urinalysis:     Microbiology:  Cultures during this admission:     Blood cultures:                 [] None drawn      [] Negative             []  Positive (Details:  )  Urine Culture:                   [] None drawn      [] Negative             []  Positive (Details:  )  Sputum Culture:               [] None drawn       [] Negative             []  Positive (Details:  )   Endotracheal aspirate:     [] None drawn       [] Negative             []  Positive (Details:  )         -----------------------------------------------------------------  Radiological reports:    CXR: PULM EDEMA VS ARDS    Electrocardiogram: NONE    Last Echocardiogram findings:  NONE         Assessment and Plan     Patient Active Problem List   Diagnosis    Respiratory depression    Acute respiratory failure with hypoxia ([facility] [address]         Additional assessment:  ACUTE HYPOXIC RESP FAILURE 2/2 AMS FROM SUSPECTED DRUG OVERDOSE vs ARDS? ?  · PRVC, ABG AS NEEDED  · UDS pending  · Versed gtt   · CT chest ARDS  ·     Hypovolemic shock requiring pressor support? · Levophed to maintain MAP > 65     LIZABETH 1.53 (baseline 0.86) 2/2 prerenal azotemia    TROPNEMIA 2/2 LIZABETH KDIGO STAGE 1 FROM HYPOPERFUSION vs Demand ischemia? · Trend trops, cards consult, echo, no ECG changes        Ashleigh Brain, DO  Emergency Medicine PGY-3  Critical Care Service  8/14/2020, 5:40 AM  Attending Physician Statement  I have discussed the care of Elena Freeman, including pertinent history and exam findings,  with the resident. I have seen and examined the patient and the key elements of all parts of the encounter have been performed by me. I agree with the assessment, plan and orders as documented by the resident with additions .     ards due aspiration , non cardiogenic pulmonary edema due to drug over dose   Septic shock   nstemi   Acute chf systolic   Plan   panculture   Iv fluis   ards vent protocol   Nimbex and sedatives   unasyn   Heparin gtt   Total critical care time caring for this patient with life threatening, unstable organ failure, including direct patient contact, management of life support systems, review of data including imaging and labs, discussions with other team members and physicians at least 28   Min so far today, excluding procedures. Treatment plan Discussed with nursing staff in detail , all questions answered . Electronically signed by Nacho Stark MD on   8/14/20 at 6:52 PM EDT    Please note that this chart was generated using voice recognition Dragon dictation software. Although every effort was made to ensure the accuracy of this automated transcription, some errors in transcription may have occurred.

## 2020-08-14 NOTE — ED PROVIDER NOTES
[address]  Emergency Department Encounter  EmergencyMedicine Resident     Pt Name:Benja Sharpe  MRN: [id number]  [place] 1985  Date of evaluation: 8/14/20  PCP:  Ernie Carmichael MD    CHIEF COMPLAINT       Chief Complaint   Patient presents with    Other       HISTORY OF PRESENT ILLNESS  (Location/Symptom, Timing/Onset, Context/Setting, Quality, Duration, Modifying Factors, Severity.)      Carley Worthy is a 38 y.o. male who presents to the emergency department with acute drug overdose and shortness of breath. On arrival patient able to answer questions but obviously tachypneic and speaking in short sentences with O2 sat in the low 80% and falling. Noted by EMS that he received Narcan prior to arrival with improvement in somnolence. Patient unable to state what he took. Further history unable to acquire secondary to severe hypoxia and need for emergent intubation. PAST MEDICAL / SURGICAL / SOCIAL / FAMILY HISTORY      has a past medical history of Bronchitis, Difficult intubation, Heartburn, Inguinal hernia, and Snores. has a past surgical history that includes Tonsillectomy; Inguinal hernia repair (Left, 04/13/2018); and pr lap,inguinal hernia repr,initial (Left, 4/13/2018). Social History     Socioeconomic History    Marital status: Single     Spouse name: Not on file    Number of children: Not on file    Years of education: Not on file    Highest education level: Not on file   Occupational History    Not on file   Social Needs    Financial resource strain: Not on file    Food insecurity     Worry: Not on file     Inability: Not on file    Transportation needs     Medical: Not on file     Non-medical: Not on file   Tobacco Use    Smoking status: Current Every Day Smoker     Packs/day: 0.50     Years: 20.00     Pack years: 10.00     Types: Cigarettes    Smokeless tobacco: Never Used    Tobacco comment: trying to quit   Substance and Sexual Activity    Alcohol use:  No Comment: monthly    Drug use: Yes     Types: Marijuana     Comment: last [facility] 4/10/2018    Sexual activity: Yes     Partners: Female     Birth control/protection: Condom   Lifestyle    Physical activity     Days per week: Not on file     Minutes per session: Not on file    Stress: Not on file   Relationships    Social connections     Talks on phone: Not on file     Gets together: Not on file     Attends Hoahaoism service: Not on file     Active member of club or organization: Not on file     Attends meetings of clubs or organizations: Not on file     Relationship status: Not on file    Intimate partner violence     Fear of current or ex partner: Not on file     Emotionally abused: Not on file     Physically abused: Not on file     Forced sexual activity: Not on file   Other Topics Concern    Not on file   Social History Narrative    Not on file       Family History   Problem Relation Age of Onset    No Known Problems Mother     No Known Problems Father     No Known Problems Sister     No Known Problems Brother     Cancer Maternal Grandmother     No Known Problems Maternal Grandfather     Diabetes Paternal Grandmother     Diabetes Paternal Grandfather        Allergies:  Patient has no known allergies. Home Medications:  Prior to Admission medications    Medication Sig Start Date End Date Taking?  Authorizing Provider   docusate sodium (COLACE) 100 MG capsule Take 1 capsule by mouth 2 times daily as needed for Constipation (use while taking narcotic pain meds) 4/13/18   Satish Lu, DO       REVIEW OF SYSTEMS    (2-9 systems for level 4, 10 or more for level 5)      Review of Systems   Unable to perform ROS: Acuity of condition       PHYSICAL EXAM   (up to 7 for level 4, 8 or more for level 5)      INITIAL VITALS:   /76   Pulse 137   Temp 98.7 °F (37.1 °C) (Rectal)   Resp 23   Ht 5' 10\" (1.778 m)   Wt 150 lb (68 kg)   SpO2 94%   BMI 21.52 kg/m²     Physical Exam  Vitals signs and nursing k/uL    Basophils Absolute 0.00 0.0 - 0.2 k/uL    Absolute Immature Granulocyte NOT REPORTED 0.00 - 0.30 k/uL    WBC Morphology NOT REPORTED     RBC Morphology NOT REPORTED     Platelet Estimate NOT REPORTED    APTT   Result Value Ref Range    PTT 26.2 24.0 - 36.0 sec   Protime-INR   Result Value Ref Range    Protime 14.2 11.8 - 14.6 sec    INR 1.1    Troponin   Result Value Ref Range    Troponin, High Sensitivity 17 0 - 22 ng/L    Troponin T NOT REPORTED <0.03 ng/mL    Troponin Interp NOT REPORTED    TOX SCR, BLD, ED   Result Value Ref Range    Acetaminophen Level <5 (L) 10 - 30 ug/mL    Ethanol <10 <10 mg/dL    Ethanol percent <6.907 %    Salicylate Lvl <1 (L) 3 - 10 mg/dL    Toxic Tricyclic Sc,Blood WRONG TEST ORDERED NEGATIVE   Urinalysis Reflex to Culture    Specimen: Urine, clean catch   Result Value Ref Range    Color, UA YELLOW YELLOW    Turbidity UA CLEAR CLEAR    Glucose, Ur 2+ (A) NEGATIVE    Bilirubin Urine NEGATIVE NEGATIVE    Ketones, Urine NEGATIVE NEGATIVE    Specific [place], UA 1.014 1.000 - 1.030    Urine Hgb SMALL (A) NEGATIVE    pH, UA 6.0 5.0 - 8.0    Protein, UA 1+ (A) NEGATIVE    Urobilinogen, Urine Normal Normal    Nitrite, Urine NEGATIVE NEGATIVE    Leukocyte Esterase, Urine NEGATIVE NEGATIVE    Urinalysis Comments NOT REPORTED    Comprehensive Metabolic Panel   Result Value Ref Range    Glucose 316 (H) 70 - 99 mg/dL    BUN 15 6 - 20 mg/dL    CREATININE 1.53 (H) 0.70 - 1.20 mg/dL    Bun/Cre Ratio NOT REPORTED 9 - 20    Calcium 8.1 (L) 8.6 - 10.4 mg/dL    Sodium 138 135 - 144 mmol/L    Potassium 4.7 3.7 - 5.3 mmol/L    Chloride 103 98 - 107 mmol/L    CO2 20 20 - 31 mmol/L    Anion Gap 15 9 - 17 mmol/L    Alkaline Phosphatase 79 40 - 129 U/L    ALT 58 (H) 5 - 41 U/L    AST 58 (H) <40 U/L    Total Bilirubin 0.24 (L) 0.3 - 1.2 mg/dL    Total Protein 6.4 6.4 - 8.3 g/dL    Alb 3.8 3.5 - 5.2 g/dL    Albumin/Globulin Ratio NOT REPORTED 1.0 - 2.5    GFR Non- 51 (L) >60 mL/min    GFR  >60 >60 mL/min    GFR Comment          GFR Staging NOT REPORTED    Drug screen, tricyclic   Result Value Ref Range    Tricyclic Antidep,Urine NEGATIVE NEGATIVE       IMPRESSION: Gregg Feldman is a 38 y.o. male who presents to the emergency department with report of drug overdose, severe respiratory distress. Could not obtain additional history secondary to severe respiratory distress. Reported drug use and improvement with Narcan. Given significant hypoxia despite alert patient decision was made to intubate the patient which was accomplished with etomidate and succinylcholine. Sedation afterwards with propofol. Very difficult to maintain O2 saturation above 90%. Saturating 92% on 100% FiO2, respiratory rate 20, PEEP 14, tidal volume 500. Will obtain CT head, CT chest, labs, ICU admit. RADIOLOGY:  No results found. EKG  EKG Interpretation    Interpreted by emergency department physician    Rhythm: Sinus tachycardia  Rate: 127  Axis: Left  Ectopy: none  Conduction: CT interval 120, QRS 94, QTc 441 with good R wave progression in the lateral leads, left anterior fascicular block  ST Segments: no acute change  T Waves: no acute change  Q Waves: none    Clinical Impression: Nonspecific EKG, left anterior fascicular block, possible right bundle branch block pattern with RSR prime in V2    Oumar Mckeon MD    All EKG's are interpreted by the Emergency Department Physician who either signs or co-signs this chart in the absence of a cardiologist.    EMERGENCY DEPARTMENT COURSE:  ED Course as of Aug 14 0123   Fri Aug 14, 2020   0123 Patient signed out to Dr. Ekta Rodriguez.     [TS]      ED Course User Index  [TS] Johney Scheuermann, MD       PROCEDURES:  PROCEDURE NOTE - EMERGENCY INTUBATION    PATIENT NAME: Srikanth RECORD NO. [id number]  DATE: 8/14/2020  ATTENDING PHYSICIAN: Ekta Rodriguez    PREOPERATIVE DIAGNOSIS:  Acute Respiratory Failure  POSTOPERATIVE DIAGNOSIS:  Same  PROCEDURE PERFORMED:  Emergency algorithms that pertain to the evaluation of patients at risk for COVID-19 are in a state of rapid change based on information released by regulatory bodies including the CDC and federal and state organizations. These policies and algorithms were followed during the patient's care in the ED.     (Please note that portions of thisnote were completed with a voice recognition program.  Efforts were made to edit the dictations but occasionally words are mis-transcribed.)        Shivani Braga MD  Resident  08/14/20 8583

## 2020-08-15 ENCOUNTER — APPOINTMENT (OUTPATIENT)
Dept: GENERAL RADIOLOGY | Age: 35
DRG: 812 | End: 2020-08-15
Attending: INTERNAL MEDICINE
Payer: MEDICAID

## 2020-08-15 LAB
ABSOLUTE EOS #: 0 K/UL (ref 0–0.4)
ABSOLUTE IMMATURE GRANULOCYTE: 0.24 K/UL (ref 0–0.3)
ABSOLUTE LYMPH #: 1.9 K/UL (ref 1–4.8)
ABSOLUTE MONO #: 0.95 K/UL (ref 0.1–0.8)
ALBUMIN SERPL-MCNC: 2.7 G/DL (ref 3.5–5.2)
ALBUMIN SERPL-MCNC: 3 G/DL (ref 3.5–5.2)
ALBUMIN/GLOBULIN RATIO: 1 (ref 1–2.5)
ALBUMIN/GLOBULIN RATIO: 1.2 (ref 1–2.5)
ALLEN TEST: ABNORMAL
ALP BLD-CCNC: 59 U/L (ref 40–129)
ALP BLD-CCNC: 63 U/L (ref 40–129)
ALT SERPL-CCNC: 27 U/L (ref 5–41)
ALT SERPL-CCNC: 36 U/L (ref 5–41)
ANION GAP SERPL CALCULATED.3IONS-SCNC: 10 MMOL/L (ref 9–17)
AST SERPL-CCNC: 20 U/L
AST SERPL-CCNC: 26 U/L
BASOPHILS # BLD: 0 % (ref 0–2)
BASOPHILS ABSOLUTE: 0 K/UL (ref 0–0.2)
BILIRUB SERPL-MCNC: 0.93 MG/DL (ref 0.3–1.2)
BILIRUB SERPL-MCNC: 1.05 MG/DL (ref 0.3–1.2)
BILIRUBIN DIRECT: 0.25 MG/DL
BILIRUBIN, INDIRECT: 0.8 MG/DL (ref 0–1)
BUN BLDV-MCNC: 11 MG/DL (ref 6–20)
BUN/CREAT BLD: ABNORMAL (ref 9–20)
CALCIUM IONIZED: 1.09 MMOL/L (ref 1.13–1.33)
CALCIUM SERPL-MCNC: 8 MG/DL (ref 8.6–10.4)
CHLORIDE BLD-SCNC: 104 MMOL/L (ref 98–107)
CO2: 25 MMOL/L (ref 20–31)
CREAT SERPL-MCNC: 0.93 MG/DL (ref 0.7–1.2)
DIFFERENTIAL TYPE: ABNORMAL
EKG ATRIAL RATE: 112 BPM
EKG P AXIS: 74 DEGREES
EKG P-R INTERVAL: 112 MS
EKG Q-T INTERVAL: 308 MS
EKG QRS DURATION: 96 MS
EKG QTC CALCULATION (BAZETT): 420 MS
EKG R AXIS: -49 DEGREES
EKG T AXIS: -43 DEGREES
EKG VENTRICULAR RATE: 112 BPM
EOSINOPHILS RELATIVE PERCENT: 0 % (ref 1–4)
FIO2: 45
GFR AFRICAN AMERICAN: >60 ML/MIN
GFR NON-AFRICAN AMERICAN: >60 ML/MIN
GFR SERPL CREATININE-BSD FRML MDRD: ABNORMAL ML/MIN/{1.73_M2}
GFR SERPL CREATININE-BSD FRML MDRD: ABNORMAL ML/MIN/{1.73_M2}
GLOBULIN: ABNORMAL G/DL (ref 1.5–3.8)
GLUCOSE BLD-MCNC: 137 MG/DL (ref 70–99)
HCT VFR BLD CALC: 40.2 % (ref 40.7–50.3)
HEMOGLOBIN: 13.2 G/DL (ref 13–17)
IMMATURE GRANULOCYTES: 1 %
LACTIC ACID, WHOLE BLOOD: 2.1 MMOL/L (ref 0.7–2.1)
LACTIC ACID, WHOLE BLOOD: 2.8 MMOL/L (ref 0.7–2.1)
LACTIC ACID, WHOLE BLOOD: 3.1 MMOL/L (ref 0.7–2.1)
LACTIC ACID: ABNORMAL MMOL/L
LACTIC ACID: ABNORMAL MMOL/L
LACTIC ACID: NORMAL MMOL/L
LYMPHOCYTES # BLD: 8 % (ref 24–44)
MAGNESIUM: 1.9 MG/DL (ref 1.6–2.6)
MCH RBC QN AUTO: 32.1 PG (ref 25.2–33.5)
MCHC RBC AUTO-ENTMCNC: 32.8 G/DL (ref 28.4–34.8)
MCV RBC AUTO: 97.8 FL (ref 82.6–102.9)
MODE: ABNORMAL
MONOCYTES # BLD: 4 % (ref 1–7)
MORPHOLOGY: ABNORMAL
NEGATIVE BASE EXCESS, ART: ABNORMAL (ref 0–2)
NRBC AUTOMATED: 0 PER 100 WBC
O2 DEVICE/FLOW/%: ABNORMAL
PARTIAL THROMBOPLASTIN TIME: 48.2 SEC (ref 20.5–30.5)
PARTIAL THROMBOPLASTIN TIME: 53.2 SEC (ref 20.5–30.5)
PATIENT TEMP: ABNORMAL
PDW BLD-RTO: 13.5 % (ref 11.8–14.4)
PHOSPHORUS: 2.1 MG/DL (ref 2.5–4.5)
PLATELET # BLD: 230 K/UL (ref 138–453)
PLATELET ESTIMATE: ABNORMAL
PMV BLD AUTO: 10.3 FL (ref 8.1–13.5)
POC HCO3: 26.3 MMOL/L (ref 21–28)
POC O2 SATURATION: 98 % (ref 94–98)
POC PCO2 TEMP: ABNORMAL MM HG
POC PCO2: 46 MM HG (ref 35–48)
POC PH TEMP: ABNORMAL
POC PH: 7.37 (ref 7.35–7.45)
POC PO2 TEMP: ABNORMAL MM HG
POC PO2: 117.9 MM HG (ref 83–108)
POSITIVE BASE EXCESS, ART: 0 (ref 0–3)
POTASSIUM SERPL-SCNC: 4.4 MMOL/L (ref 3.7–5.3)
RBC # BLD: 4.11 M/UL (ref 4.21–5.77)
RBC # BLD: ABNORMAL 10*6/UL
SAMPLE SITE: ABNORMAL
SEG NEUTROPHILS: 87 % (ref 36–66)
SEGMENTED NEUTROPHILS ABSOLUTE COUNT: 20.71 K/UL (ref 1.8–7.7)
SODIUM BLD-SCNC: 139 MMOL/L (ref 135–144)
TCO2 (CALC), ART: 28 MMOL/L (ref 22–29)
TOTAL CK: 384 U/L (ref 39–308)
TOTAL PROTEIN: 5.4 G/DL (ref 6.4–8.3)
TOTAL PROTEIN: 5.6 G/DL (ref 6.4–8.3)
WBC # BLD: 23.8 K/UL (ref 3.5–11.3)
WBC # BLD: ABNORMAL 10*3/UL

## 2020-08-15 PROCEDURE — 6360000002 HC RX W HCPCS: Performed by: STUDENT IN AN ORGANIZED HEALTH CARE EDUCATION/TRAINING PROGRAM

## 2020-08-15 PROCEDURE — 82330 ASSAY OF CALCIUM: CPT

## 2020-08-15 PROCEDURE — 6370000000 HC RX 637 (ALT 250 FOR IP): Performed by: STUDENT IN AN ORGANIZED HEALTH CARE EDUCATION/TRAINING PROGRAM

## 2020-08-15 PROCEDURE — 94003 VENT MGMT INPAT SUBQ DAY: CPT

## 2020-08-15 PROCEDURE — 99291 CRITICAL CARE FIRST HOUR: CPT | Performed by: INTERNAL MEDICINE

## 2020-08-15 PROCEDURE — 37799 UNLISTED PX VASCULAR SURGERY: CPT

## 2020-08-15 PROCEDURE — 86403 PARTICLE AGGLUT ANTBDY SCRN: CPT

## 2020-08-15 PROCEDURE — 80076 HEPATIC FUNCTION PANEL: CPT

## 2020-08-15 PROCEDURE — 85730 THROMBOPLASTIN TIME PARTIAL: CPT

## 2020-08-15 PROCEDURE — 2500000003 HC RX 250 WO HCPCS: Performed by: STUDENT IN AN ORGANIZED HEALTH CARE EDUCATION/TRAINING PROGRAM

## 2020-08-15 PROCEDURE — 82803 BLOOD GASES ANY COMBINATION: CPT

## 2020-08-15 PROCEDURE — 83605 ASSAY OF LACTIC ACID: CPT

## 2020-08-15 PROCEDURE — 85025 COMPLETE CBC W/AUTO DIFF WBC: CPT

## 2020-08-15 PROCEDURE — 80053 COMPREHEN METABOLIC PANEL: CPT

## 2020-08-15 PROCEDURE — 2500000003 HC RX 250 WO HCPCS: Performed by: INTERNAL MEDICINE

## 2020-08-15 PROCEDURE — 2000000000 HC ICU R&B

## 2020-08-15 PROCEDURE — 83735 ASSAY OF MAGNESIUM: CPT

## 2020-08-15 PROCEDURE — 94770 HC ETCO2 MONITOR DAILY: CPT

## 2020-08-15 PROCEDURE — 84100 ASSAY OF PHOSPHORUS: CPT

## 2020-08-15 PROCEDURE — 94761 N-INVAS EAR/PLS OXIMETRY MLT: CPT

## 2020-08-15 PROCEDURE — 2580000003 HC RX 258: Performed by: STUDENT IN AN ORGANIZED HEALTH CARE EDUCATION/TRAINING PROGRAM

## 2020-08-15 PROCEDURE — 82550 ASSAY OF CK (CPK): CPT

## 2020-08-15 PROCEDURE — 87186 SC STD MICRODIL/AGAR DIL: CPT

## 2020-08-15 PROCEDURE — 71045 X-RAY EXAM CHEST 1 VIEW: CPT

## 2020-08-15 PROCEDURE — 2700000000 HC OXYGEN THERAPY PER DAY

## 2020-08-15 RX ORDER — CALCIUM GLUCONATE 20 MG/ML
1 INJECTION, SOLUTION INTRAVENOUS ONCE
Status: COMPLETED | OUTPATIENT
Start: 2020-08-15 | End: 2020-08-15

## 2020-08-15 RX ADMIN — HEPARIN SODIUM 1960 UNITS: 1000 INJECTION INTRAVENOUS; SUBCUTANEOUS at 02:00

## 2020-08-15 RX ADMIN — SODIUM CHLORIDE 1.5 G: 900 INJECTION INTRAVENOUS at 04:41

## 2020-08-15 RX ADMIN — Medication 100 MCG/HR: at 14:53

## 2020-08-15 RX ADMIN — SODIUM CHLORIDE, PRESERVATIVE FREE 10 ML: 5 INJECTION INTRAVENOUS at 07:39

## 2020-08-15 RX ADMIN — Medication 10 MG/HR: at 18:19

## 2020-08-15 RX ADMIN — Medication 8 MCG/MIN: at 04:08

## 2020-08-15 RX ADMIN — CALCIUM GLUCONATE 1 G: 20 INJECTION, SOLUTION INTRAVENOUS at 08:53

## 2020-08-15 RX ADMIN — POTASSIUM PHOSPHATE, MONOBASIC AND POTASSIUM PHOSPHATE, DIBASIC 15 MMOL: 224; 236 INJECTION, SOLUTION, CONCENTRATE INTRAVENOUS at 09:13

## 2020-08-15 RX ADMIN — Medication 100 MCG/HR: at 04:52

## 2020-08-15 RX ADMIN — SODIUM CHLORIDE, PRESERVATIVE FREE 10 ML: 5 INJECTION INTRAVENOUS at 19:53

## 2020-08-15 RX ADMIN — SODIUM CHLORIDE 1.5 G: 900 INJECTION INTRAVENOUS at 10:32

## 2020-08-15 RX ADMIN — FAMOTIDINE 20 MG: 10 INJECTION, SOLUTION INTRAVENOUS at 20:07

## 2020-08-15 RX ADMIN — FAMOTIDINE 20 MG: 10 INJECTION, SOLUTION INTRAVENOUS at 07:38

## 2020-08-15 RX ADMIN — Medication 10 MG/HR: at 07:36

## 2020-08-15 RX ADMIN — Medication 100 MCG/HR: at 23:18

## 2020-08-15 RX ADMIN — Medication 100 MG: at 07:38

## 2020-08-15 RX ADMIN — ENOXAPARIN SODIUM 30 MG: 30 INJECTION SUBCUTANEOUS at 18:07

## 2020-08-15 RX ADMIN — PIPERACILLIN AND TAZOBACTAM 3.38 G: 3; .375 INJECTION, POWDER, FOR SOLUTION INTRAVENOUS at 16:40

## 2020-08-15 RX ADMIN — PIPERACILLIN AND TAZOBACTAM 3.38 G: 3; .375 INJECTION, POWDER, FOR SOLUTION INTRAVENOUS at 22:30

## 2020-08-15 RX ADMIN — ACETAMINOPHEN 650 MG: 325 TABLET ORAL at 16:42

## 2020-08-15 RX ADMIN — SODIUM CHLORIDE, PRESERVATIVE FREE 10 ML: 5 INJECTION INTRAVENOUS at 19:52

## 2020-08-15 RX ADMIN — SODIUM CHLORIDE 2.5 MCG/KG/MIN: 9 INJECTION, SOLUTION INTRAVENOUS at 04:02

## 2020-08-15 RX ADMIN — Medication 100 MG: at 19:52

## 2020-08-15 ASSESSMENT — PULMONARY FUNCTION TESTS
PIF_VALUE: 24
PIF_VALUE: 23
PIF_VALUE: 24
PIF_VALUE: 22
PIF_VALUE: 25
PIF_VALUE: 22
PIF_VALUE: 21
PIF_VALUE: 23
PIF_VALUE: 19
PIF_VALUE: 28
PIF_VALUE: 19
PIF_VALUE: 13
PIF_VALUE: 25
PIF_VALUE: 25
PIF_VALUE: 26
PIF_VALUE: 22

## 2020-08-15 ASSESSMENT — PAIN SCALES - GENERAL
PAINLEVEL_OUTOF10: 0
PAINLEVEL_OUTOF10: 0

## 2020-08-15 NOTE — PROGRESS NOTES
Critical Care was notified of 1 out of 2 positive blood cultures gram negative rods-Serratia marcescens.

## 2020-08-15 NOTE — PLAN OF CARE
Problem: OXYGENATION/RESPIRATORY FUNCTION  Goal: Patient will maintain patent airway  8/15/2020 0215 by Edwinna Pod, RCP  Outcome: Ongoing     Problem: OXYGENATION/RESPIRATORY FUNCTION  Goal: Patient will achieve/maintain normal respiratory rate/effort  Description: Respiratory rate and effort will be within normal limits for the patient  8/15/2020 0215 by Edwinna Pod, RCP  Outcome: Ongoing     Problem: MECHANICAL VENTILATION  Goal: Patient will maintain patent airway  8/15/2020 0215 by Edwinna Pod, RCP  Outcome: Ongoing     Problem: MECHANICAL VENTILATION  Goal: Oral health is maintained or improved  8/15/2020 0215 by Edwinna Pod, RCP  Outcome: Ongoing     Problem: MECHANICAL VENTILATION  Goal: ET tube will be managed safely  8/15/2020 0215 by Edwinna Pod, RCP  Outcome: Ongoing     Problem: MECHANICAL VENTILATION  Goal: Ability to express needs and understand communication  8/15/2020 0215 by Edwinna Pod, RCP  Outcome: Ongoing     Problem: MECHANICAL VENTILATION  Goal: Mobility/activity is maintained at optimum level for patient  8/15/2020 0215 by Edwinna Pod, RCP  Outcome: Ongoing     Problem: SKIN INTEGRITY  Goal: Skin integrity is maintained or improved  8/15/2020 0215 by Edwinna Pod, RCP  Outcome: Ongoing

## 2020-08-15 NOTE — PLAN OF CARE
Problem: OXYGENATION/RESPIRATORY FUNCTION  Goal: Patient will maintain patent airway  8/14/2020 2104 by Michelle Kaufman RN  Outcome: Ongoing  8/14/2020 1531 by Kasandra Daniel RCP  Outcome: Ongoing  Goal: Patient will achieve/maintain normal respiratory rate/effort  Description: Respiratory rate and effort will be within normal limits for the patient  8/14/2020 2104 by Michelle Kaufman RN  Outcome: Ongoing  8/14/2020 1531 by Kasandra Daniel RCP  Outcome: Ongoing     Problem: MECHANICAL VENTILATION  Goal: Patient will maintain patent airway  8/14/2020 2104 by Michelle Kaufman RN  Outcome: Ongoing  8/14/2020 1531 by Kasandra Daniel RCP  Outcome: Ongoing  Goal: Oral health is maintained or improved  8/14/2020 2104 by Michelle Kaufman RN  Outcome: Ongoing  8/14/2020 1531 by Kasandra Daniel RCP  Outcome: Ongoing  Goal: ET tube will be managed safely  8/14/2020 2104 by Michelle Kaufman RN  Outcome: Ongoing  8/14/2020 1531 by Kasandra Daniel RCP  Outcome: Ongoing  Goal: Ability to express needs and understand communication  8/14/2020 2104 by Michelle Kaufman RN  Outcome: Ongoing  8/14/2020 1531 by Kasandra Daniel RCP  Outcome: Ongoing  Goal: Mobility/activity is maintained at optimum level for patient  8/14/2020 2104 by Michelle Kaufman RN  Outcome: Ongoing  8/14/2020 1531 by Kasandra Daniel RCP  Outcome: Ongoing     Problem: SKIN INTEGRITY  Goal: Skin integrity is maintained or improved  8/14/2020 2104 by Michelle Kaufman RN  Outcome: Ongoing  8/14/2020 1531 by Kasandra Daniel RCP  Outcome: Ongoing     Problem: NUTRITION  Goal: Nutritional status is improving  Outcome: Ongoing     Problem: Skin Integrity:  Goal: Will show no infection signs and symptoms  Description: Will show no infection signs and symptoms  Outcome: Ongoing  Goal: Absence of new skin breakdown  Description: Absence of new skin breakdown  Outcome: Ongoing     Problem: Falls - Risk of:  Goal: Will remain free from falls  Description: Will remain free

## 2020-08-15 NOTE — PROGRESS NOTES
[facility]   Progress Note                    Date:   8/15/2020  Patient name:  Jerel Lopez  Date of admission:  8/14/2020  5:18 AM  MRN:   [id number]  YOB: 1985  PCP:    No primary care provider on file. Reason for Admission:  Acute respiratory failure with hypoxia ([address] [J96.01]    Subjective:       Clinical Changes / Abnormalities: Patient remains intubated, sedated and paralyzed          I/O last 3 completed shifts: In: 2526.3 [I.V.:1116.3; IV Piggyback:1410]  Out: 1630 [Urine:1400; Emesis/NG output:230]  I/O this shift:   In: 707 [I.V.:707]  Out: 575 [Urine:575]      In: 3171.3 [I.V.:1761.3]  Out: 1855 [Urine:1625]      Intake/Output Summary (Last 24 hours) at 8/15/2020 1425  Last data filed at 8/15/2020 1200  Gross per 24 hour   Intake 3171.33 ml   Output 1855 ml   Net 1316.33 ml         I/O since admission:  liters    Medications:   Scheduled Meds:   piperacillin-tazobactam  3.375 g Intravenous Q8H    famotidine (PEPCID) injection  20 mg Intravenous BID    sodium chloride flush  10 mL Intravenous 2 times per day    sodium chloride flush  10 mL Intravenous 2 times per day    docusate  100 mg Per NG tube BID     Continuous Infusions:   midazolam 5 mg/hr (08/15/20 1318)    norepinephrine Stopped (08/15/20 0856)    cisatracurium (NIMBEX) infusion Stopped (08/15/20 1056)    fentaNYL 100 mcg/hr (08/15/20 0452)    sodium chloride 75 mL/hr at 08/14/20 1114     CBC:   Recent Labs     08/13/20 2339 08/14/20  0848 08/15/20  0457   WBC 9.2 13.1* 23.8*   HGB 15.7 16.1 13.2    246 230     BMP:    Recent Labs     08/13/20 2339 08/14/20 0848 08/15/20  0457    140 139   K 4.7 4.3 4.4    107 104   CO2 20 22 25   BUN 15 14 11   CREATININE 1.53* 0.92 0.93   GLUCOSE 316* 106* 137*     Hepatic:   Recent Labs     08/13/20  2339 08/14/20  0848 08/15/20  0457   AST 58* 54* 26   ALT 58* 50* 36   BILITOT 0.24* 0.89 0.93   ALKPHOS 79 66 59     Troponin:  Recent Labs 08/14/20  0405 08/14/20  1440 08/14/20 2028   TROPHS 257* 121* 72*            No results for input(s): TROPONINI in the last 72 hours. Recent Labs     08/14/20  0405 08/14/20  1440 08/14/20 2028   TROPONINT NOT REPORTED NOT REPORTED NOT REPORTED     BNP: No results for input(s): PROBNP in the last 72 hours. No results for input(s): BNP in the last 72 hours. Lipids: No results for input(s): CHOL, HDL in the last 72 hours. Invalid input(s): LDLCALCU  INR:   Recent Labs     08/13/20  2339   INR 1.1       Objective:   Vitals: /69   Pulse 96   Temp 99.7 °F (37.6 °C) (Axillary)   Resp 22   Ht 5' 4\" (1.626 m)   Wt 150 lb 12.7 oz (68.4 kg)   SpO2 99%   BMI 25.88 kg/m²      Constitutional and General Appearance: Intubated, sedated   HEENT: PERRL, no cervical lymphadenopathy. No masses palpable. Normal oral mucosa  Respiratory:  · Bilateral decreased breath sounds, ET tube in place  Cardiovascular:  · The apical impulse is not displaced  · Heart tones are crisp and normal. regular S1 and S2.  · Jugular venous pulsation Normal  · The carotid upstroke is normal in amplitude and contour without delay or bruit  · Peripheral pulses are symmetrical and full   Abdomen:   · No masses or tenderness  · Bowel sounds present  Extremities:  ·  No Cyanosis or Clubbing  ·  Lower extremity edema: No  ·  Skin: Warm and dry  Neurological:  · Alert and oriented. · Moves all extremities well  · No abnormalities of mood, affect, memory, mentation, or behavior are noted    Diagnostic Studies:     EKG: T wave inversions in 2 3 and aVF, incomplete right bundle branch block,       Echo 8/14/20    Left ventricle is normal in size. Global left ventricular systolic function  appears moderately reduced. Estimated ejection fraction is 30-35 % . Grade I (mild) left ventricular diastolic dysfunction. Trivial tricuspid regurgitation.     Patient's Active Problem List   Active Problems:    Acute respiratory failure with hypoxia ([address]  Resolved Problems:    * No resolved hospital problems. *        Assessment / Acute Cardiac Problems:   1. Altered mental status  2. Acute respiratory failure s/p intubation  3. Troponin elevation -likely due to demand ischemia from hypotension on presentation  4. LIZABETH on presentation: Resolved  5. New systolic dysfunction, LVEF 30-35%  6. U tox positive for marijuana        Plan of Treatment:   1. Will consider further work-up once patient recovers from respiratory and neurological standpoint. 2. Can discontinue heparin drip  3. Will follow peripherally, please call back if needed. Deon Moyer MD  Fellow, [address]            Please note that part of this chart were generated using voice recognition  dictation software. Although every effort was made to ensure the accuracy of this automated transcription, some errors in transcription may have occurred. Attending Cardiologist Addendum: I have reviewed and performed the history, physical, subjective, objective, assessment, and plan with the resident/fellow and agree with the note. I performed the history and physical personally. I have made changes to the note above as needed. Thank you for allowing me to participate in the care of this patient, please do not hesitate to call if you have any questions. Charlynne Severs, [id number] [facility]  [facility]. [facility]  [phone]

## 2020-08-15 NOTE — PROGRESS NOTES
INTENSIVE CARE UNIT  Resident Physician Progress Note    Patient - Ros Bautista  Date of Admission -  8/14/2020  5:18 AM  Date of Evaluation -  8/15/2020  Room and Bed Number -  0127/0127-01   Hospital Day - 1     Patient admitted in the ICU secondary to toxic encephalopathy post intubation, requiring Levophed. SUBJECTIVE:     OVERNIGHT EVENTS:    No acute overnight events. TODAY:    Vitals stable, heart rate in 90s. Remains mechanically ventilated, PRVC RR 26, , PEEP 5, FiO2 40% on arts protocol. ABG consistent with pH 7.365, PCO2 46.0, O2 117.9. Patient remains on Levophed drip, sedated on Versed and fentanyl. Paralyzed with Nimbex. Was started on heparin drip yesterday for troponin elevation of 250s. Tolerating tube feeds. Total intake 2500 mL, total output 1630 mL since 24 hours  Total net +196 mL since admission    Chest x-ray showed significant improved aeration, minimal residual opacity. Echo showed EF of 2003%, grade 1 diastolic dysfunction. Blood culture came back positive for Serratia, will switch Unasyn to Zosyn. Plan: Discontinue the Nimbex drip, hold sedation and see how the patient does, monitor vitals. We will see how the patient does on weaning trial/CPAP. -Heparin drip discontinued as per cardiology recommendations. Patient seen and examined off the Nimbex and sedation. Blood pressure stable and heart rate in 110s. Currently on CPAP/NIV settings. Patient has been air-trapping a lot. On examination, not responding to name or following any commands. We will switch back to [facility] vent settings.     AWAKE & FOLLOWING COMMANDS:  [x] No   [] Yes    SECRETIONS Amount:  [] Small [x] Moderate  [] Large  [] None  Color:     [] White [x] Colored  [] Bloody    SEDATION:  RAAS Score:  [] Propofol gtt  [x] Versed gtt  [] Ativan gtt   [] No Sedation    PARALYZED:  [] No    [x] Yes    VASOPRESSORS:  [] No    [] Yes  [x] Levophed [] Dopamine [] Vasopressin  [] Dobutamine [] Phenylephrine [] Epinephrine      OBJECTIVE:     VITAL SIGNS:  BP (!) 83/57   Pulse 90   Temp 98.1 °F (36.7 °C) (Oral)   Resp 26   Ht 5' 4\" (1.626 m)   Wt 150 lb 12.7 oz (68.4 kg)   SpO2 100%   BMI 25.88 kg/m²   Tmax over 24 hours:  Temp (24hrs), Av.5 °F (36.9 °C), Min:98.1 °F (36.7 °C), Max:99 °F (37.2 °C)      Patient Vitals for the past 8 hrs:   BP Temp Temp src Pulse Resp SpO2 Weight   08/15/20 0715 -- -- -- 90 26 -- --   08/15/20 0700 -- -- -- 89 26 100 % --   08/15/20 0645 -- -- -- 97 26 100 % --   08/15/20 0630 -- -- -- 96 25 100 % --   08/15/20 0615 -- -- -- 98 26 100 % --   08/15/20 0600 (!) 83/57 -- -- 98 26 100 % --   08/15/20 0545 -- -- -- 98 25 100 % --   08/15/20 0530 -- -- -- 98 26 99 % --   08/15/20 0515 -- -- -- 98 26 99 % --   08/15/20 0509 -- -- -- -- -- -- 150 lb 12.7 oz (68.4 kg)   08/15/20 0500 -- -- -- 95 26 99 % --   08/15/20 0445 -- -- -- 94 25 98 % --   08/15/20 0430 -- -- -- 106 26 98 % --   08/15/20 0415 -- -- -- 103 24 99 % --   08/15/20 0400 94/69 98.1 °F (36.7 °C) Oral 100 23 100 % --   08/15/20 0345 -- -- -- 111 26 99 % --   08/15/20 0338 -- -- -- 113 26 100 % --   08/15/20 0330 -- -- -- 107 26 100 % --   08/15/20 0315 -- -- -- 108 26 100 % --   08/15/20 0300 -- -- -- 108 26 99 % --   08/15/20 0245 -- -- -- 109 26 100 % --   08/15/20 0230 -- -- -- 109 26 99 % --   08/15/20 0215 -- -- -- 110 26 99 % --   08/15/20 0200 97/64 -- -- 112 26 99 % --   08/15/20 0145 -- -- -- 112 26 99 % --   08/15/20 0130 -- -- -- 113 26 99 % --   08/15/20 0115 -- -- -- 112 26 99 % --   08/15/20 0100 -- -- -- 113 26 99 % --   08/15/20 0045 -- -- -- 112 26 99 % --   08/15/20 0030 -- -- -- 114 26 99 % --   08/15/20 0015 -- -- -- 110 26 99 % --         Intake/Output Summary (Last 24 hours) at 8/15/2020 0801  Last data filed at 8/15/2020 0400  Gross per 24 hour   Intake 2464.33 ml   Output 1630 ml   Net 834.33 ml     Date 08/15/20 0000 - 08/15/20 235   Shift 6832-5544 [id number] 6440-6142 24 Hour Lovenox  GI prophylaxis : Stormy Yun MD  PGY 2 Internal Medicine Resident  [address] [place] [address]  8/15/2020 3:04 PM  Attending Physician Statement  I have discussed the care of Marcie Cristobal, including pertinent history and exam findings,  with the resident. I have seen and examined the patient and the key elements of all parts of the encounter have been performed by me. I agree with the assessment, plan and orders as documented by the resident with additions . Serratia sepsis - antibiotics changed   Acute hypoxic respiratory failure   Acute systolic chf   Cont vent support - dc paralysis - xray better , gas exchange better , sec dec   Start daily sbt   Dc heparin gtt   Start tf   Dec iv fluids          Total critical care time caring for this patient with life threatening, unstable organ failure, including direct patient contact, management of life support systems, review of data including imaging and labs, discussions with other team members and physicians at least 27   Min so far today, excluding procedures. Treatment plan Discussed with nursing staff in detail , all questions answered . Electronically signed by Lennox Mae, MD on   8/15/20 at 6:17 PM EDT    Please note that this chart was generated using voice recognition Dragon dictation software. Although every effort was made to ensure the accuracy of this automated transcription, some errors in transcription may have occurred.

## 2020-08-15 NOTE — PLAN OF CARE
Problem: OXYGENATION/RESPIRATORY FUNCTION  Goal: Patient will maintain patent airway  8/15/2020 1524 by Warren Hamilton RCP  Outcome: Ongoing     Problem: OXYGENATION/RESPIRATORY FUNCTION  Goal: Patient will achieve/maintain normal respiratory rate/effort  Description: Respiratory rate and effort will be within normal limits for the patient  8/15/2020 1524 by Warren Hamilton RCP  Outcome: Ongoing     Problem: MECHANICAL VENTILATION  Goal: Patient will maintain patent airway  8/15/2020 1524 by Warren Hamilton RCP  Outcome: Ongoing     Problem: MECHANICAL VENTILATION  Goal: Oral health is maintained or improved  8/15/2020 1524 by Warren Hamilton RCP  Outcome: Ongoing     Problem: MECHANICAL VENTILATION  Goal: ET tube will be managed safely  8/15/2020 1524 by Warren Hamilton RCP  Outcome: Ongoing     Problem: MECHANICAL VENTILATION  Goal: Ability to express needs and understand communication  8/15/2020 1524 by Warren Hamilton RCP  Outcome: Ongoing     Problem: MECHANICAL VENTILATION  Goal: Mobility/activity is maintained at optimum level for patient  8/15/2020 1524 by Warren Hamilton RCP  Outcome: Ongoing     Problem: SKIN INTEGRITY  Goal: Skin integrity is maintained or improved  8/15/2020 1524 by Warren Hamilton RCP  Outcome: Ongoing     Ventilator Bronchodilator assessment    Breath sounds: clear all lung fields  Inspiratory Pressure: 19  Plateau Pressure: 17    Patient assessed at level 1          []    Bronchodilator Assessment    BRONCHODILATOR ASSESSMENT SCORE  Score 0 (Home) 1 2 3 4   Breath Sounds   []  Chronic Ventilator: Patient at baseline [x]  Mild Wheezes/ Clear []  Intermittent wheezes with good air entry []  Bilateral/unilateral wheezing with diminished air entry []  Insp/Exp wheeze and/or poor aeration   Ventilator Pressures   []  Chronic Ventilator [x]  Insp. Pressure less than 25 cm H20 [x]  Insp. Pressure less than 25 cm H20 []  Insp. Pressure exceeds 25 cm H20 []  Insp.  Pressure exceeds 30 cm H20

## 2020-08-16 ENCOUNTER — APPOINTMENT (OUTPATIENT)
Dept: GENERAL RADIOLOGY | Age: 35
DRG: 812 | End: 2020-08-16
Attending: INTERNAL MEDICINE
Payer: MEDICAID

## 2020-08-16 LAB
ABSOLUTE EOS #: 0.1 K/UL (ref 0–0.44)
ABSOLUTE IMMATURE GRANULOCYTE: 0.09 K/UL (ref 0–0.3)
ABSOLUTE LYMPH #: 1.48 K/UL (ref 1.1–3.7)
ABSOLUTE MONO #: 0.62 K/UL (ref 0.1–1.2)
ALBUMIN SERPL-MCNC: 2.8 G/DL (ref 3.5–5.2)
ALBUMIN/GLOBULIN RATIO: 1 (ref 1–2.5)
ALLEN TEST: ABNORMAL
ALLEN TEST: ABNORMAL
ALP BLD-CCNC: 66 U/L (ref 40–129)
ALT SERPL-CCNC: 24 U/L (ref 5–41)
ANION GAP SERPL CALCULATED.3IONS-SCNC: 11 MMOL/L (ref 9–17)
ANION GAP SERPL CALCULATED.3IONS-SCNC: 14 MMOL/L (ref 9–17)
ANION GAP SERPL CALCULATED.3IONS-SCNC: 16 MMOL/L (ref 9–17)
AST SERPL-CCNC: 18 U/L
BASOPHILS # BLD: 0 % (ref 0–2)
BASOPHILS ABSOLUTE: 0.03 K/UL (ref 0–0.2)
BILIRUB SERPL-MCNC: 1.6 MG/DL (ref 0.3–1.2)
BUN BLDV-MCNC: 10 MG/DL (ref 6–20)
BUN BLDV-MCNC: 11 MG/DL (ref 6–20)
BUN BLDV-MCNC: 9 MG/DL (ref 6–20)
BUN/CREAT BLD: ABNORMAL (ref 9–20)
CALCIUM IONIZED: 1.14 MMOL/L (ref 1.13–1.33)
CALCIUM SERPL-MCNC: 8.5 MG/DL (ref 8.6–10.4)
CALCIUM SERPL-MCNC: 8.6 MG/DL (ref 8.6–10.4)
CALCIUM SERPL-MCNC: 8.6 MG/DL (ref 8.6–10.4)
CHLORIDE BLD-SCNC: 102 MMOL/L (ref 98–107)
CHLORIDE BLD-SCNC: 97 MMOL/L (ref 98–107)
CHLORIDE BLD-SCNC: 97 MMOL/L (ref 98–107)
CO2: 20 MMOL/L (ref 20–31)
CO2: 23 MMOL/L (ref 20–31)
CO2: 25 MMOL/L (ref 20–31)
CREAT SERPL-MCNC: 0.94 MG/DL (ref 0.7–1.2)
CREAT SERPL-MCNC: 0.96 MG/DL (ref 0.7–1.2)
CREAT SERPL-MCNC: 1 MG/DL (ref 0.7–1.2)
DIFFERENTIAL TYPE: ABNORMAL
EOSINOPHILS RELATIVE PERCENT: 1 % (ref 1–4)
FIO2: 100
FIO2: 40
GFR AFRICAN AMERICAN: >60 ML/MIN
GFR NON-AFRICAN AMERICAN: >60 ML/MIN
GFR SERPL CREATININE-BSD FRML MDRD: ABNORMAL ML/MIN/{1.73_M2}
GLUCOSE BLD-MCNC: 100 MG/DL (ref 70–99)
GLUCOSE BLD-MCNC: 106 MG/DL (ref 74–100)
GLUCOSE BLD-MCNC: 112 MG/DL (ref 70–99)
GLUCOSE BLD-MCNC: 135 MG/DL (ref 70–99)
HCT VFR BLD CALC: 33.9 % (ref 40.7–50.3)
HEMOGLOBIN: 11.2 G/DL (ref 13–17)
IMMATURE GRANULOCYTES: 1 %
LYMPHOCYTES # BLD: 10 % (ref 24–43)
MAGNESIUM: 1.8 MG/DL (ref 1.6–2.6)
MAGNESIUM: 2 MG/DL (ref 1.6–2.6)
MAGNESIUM: 2.3 MG/DL (ref 1.6–2.6)
MCH RBC QN AUTO: 31.3 PG (ref 25.2–33.5)
MCHC RBC AUTO-ENTMCNC: 33 G/DL (ref 28.4–34.8)
MCV RBC AUTO: 94.7 FL (ref 82.6–102.9)
MODE: ABNORMAL
MODE: ABNORMAL
MONOCYTES # BLD: 4 % (ref 3–12)
NEGATIVE BASE EXCESS, ART: ABNORMAL (ref 0–2)
NEGATIVE BASE EXCESS, ART: ABNORMAL (ref 0–2)
NRBC AUTOMATED: 0 PER 100 WBC
O2 DEVICE/FLOW/%: ABNORMAL
O2 DEVICE/FLOW/%: ABNORMAL
PATIENT TEMP: ABNORMAL
PATIENT TEMP: ABNORMAL
PDW BLD-RTO: 13.3 % (ref 11.8–14.4)
PHOSPHORUS: 1 MG/DL (ref 2.5–4.5)
PHOSPHORUS: 3.9 MG/DL (ref 2.5–4.5)
PLATELET # BLD: 192 K/UL (ref 138–453)
PLATELET ESTIMATE: ABNORMAL
PMV BLD AUTO: 10 FL (ref 8.1–13.5)
POC HCO3: 23.8 MMOL/L (ref 21–28)
POC HCO3: 26.6 MMOL/L (ref 21–28)
POC O2 SATURATION: 98 % (ref 94–98)
POC O2 SATURATION: 99 % (ref 94–98)
POC PCO2 TEMP: ABNORMAL MM HG
POC PCO2 TEMP: ABNORMAL MM HG
POC PCO2: 36.7 MM HG (ref 35–48)
POC PCO2: 43.9 MM HG (ref 35–48)
POC PH TEMP: ABNORMAL
POC PH TEMP: ABNORMAL
POC PH: 7.39 (ref 7.35–7.45)
POC PH: 7.42 (ref 7.35–7.45)
POC PO2 TEMP: ABNORMAL MM HG
POC PO2 TEMP: ABNORMAL MM HG
POC PO2: 112.8 MM HG (ref 83–108)
POC PO2: 161.2 MM HG (ref 83–108)
POSITIVE BASE EXCESS, ART: 0 (ref 0–3)
POSITIVE BASE EXCESS, ART: 1 (ref 0–3)
POTASSIUM SERPL-SCNC: 3.4 MMOL/L (ref 3.7–5.3)
POTASSIUM SERPL-SCNC: 3.7 MMOL/L (ref 3.7–5.3)
POTASSIUM SERPL-SCNC: 4.1 MMOL/L (ref 3.7–5.3)
RBC # BLD: 3.58 M/UL (ref 4.21–5.77)
RBC # BLD: ABNORMAL 10*6/UL
SAMPLE SITE: ABNORMAL
SAMPLE SITE: ABNORMAL
SEG NEUTROPHILS: 85 % (ref 36–65)
SEGMENTED NEUTROPHILS ABSOLUTE COUNT: 12.99 K/UL (ref 1.5–8.1)
SODIUM BLD-SCNC: 133 MMOL/L (ref 135–144)
SODIUM BLD-SCNC: 136 MMOL/L (ref 135–144)
SODIUM BLD-SCNC: 136 MMOL/L (ref 135–144)
TCO2 (CALC), ART: 25 MMOL/L (ref 22–29)
TCO2 (CALC), ART: 28 MMOL/L (ref 22–29)
TOTAL CK: 254 U/L (ref 39–308)
TOTAL PROTEIN: 5.7 G/DL (ref 6.4–8.3)
WBC # BLD: 15.3 K/UL (ref 3.5–11.3)
WBC # BLD: ABNORMAL 10*3/UL

## 2020-08-16 PROCEDURE — 6370000000 HC RX 637 (ALT 250 FOR IP): Performed by: STUDENT IN AN ORGANIZED HEALTH CARE EDUCATION/TRAINING PROGRAM

## 2020-08-16 PROCEDURE — 6360000002 HC RX W HCPCS: Performed by: HEALTH CARE PROVIDER

## 2020-08-16 PROCEDURE — 6360000002 HC RX W HCPCS: Performed by: STUDENT IN AN ORGANIZED HEALTH CARE EDUCATION/TRAINING PROGRAM

## 2020-08-16 PROCEDURE — 82803 BLOOD GASES ANY COMBINATION: CPT

## 2020-08-16 PROCEDURE — 37799 UNLISTED PX VASCULAR SURGERY: CPT

## 2020-08-16 PROCEDURE — 2580000003 HC RX 258: Performed by: STUDENT IN AN ORGANIZED HEALTH CARE EDUCATION/TRAINING PROGRAM

## 2020-08-16 PROCEDURE — 82947 ASSAY GLUCOSE BLOOD QUANT: CPT

## 2020-08-16 PROCEDURE — 0BH18EZ INSERTION OF ENDOTRACHEAL AIRWAY INTO TRACHEA, VIA NATURAL OR ARTIFICIAL OPENING ENDOSCOPIC: ICD-10-PCS | Performed by: INTERNAL MEDICINE

## 2020-08-16 PROCEDURE — 71045 X-RAY EXAM CHEST 1 VIEW: CPT

## 2020-08-16 PROCEDURE — 99291 CRITICAL CARE FIRST HOUR: CPT | Performed by: INTERNAL MEDICINE

## 2020-08-16 PROCEDURE — 31500 INSERT EMERGENCY AIRWAY: CPT

## 2020-08-16 PROCEDURE — 2500000003 HC RX 250 WO HCPCS: Performed by: INTERNAL MEDICINE

## 2020-08-16 PROCEDURE — 36415 COLL VENOUS BLD VENIPUNCTURE: CPT

## 2020-08-16 PROCEDURE — 87040 BLOOD CULTURE FOR BACTERIA: CPT

## 2020-08-16 PROCEDURE — 84100 ASSAY OF PHOSPHORUS: CPT

## 2020-08-16 PROCEDURE — 6370000000 HC RX 637 (ALT 250 FOR IP): Performed by: INTERNAL MEDICINE

## 2020-08-16 PROCEDURE — 2000000000 HC ICU R&B

## 2020-08-16 PROCEDURE — 31500 INSERT EMERGENCY AIRWAY: CPT | Performed by: INTERNAL MEDICINE

## 2020-08-16 PROCEDURE — 85025 COMPLETE CBC W/AUTO DIFF WBC: CPT

## 2020-08-16 PROCEDURE — 94770 HC ETCO2 MONITOR DAILY: CPT

## 2020-08-16 PROCEDURE — 94003 VENT MGMT INPAT SUBQ DAY: CPT

## 2020-08-16 PROCEDURE — 94761 N-INVAS EAR/PLS OXIMETRY MLT: CPT

## 2020-08-16 PROCEDURE — 2700000000 HC OXYGEN THERAPY PER DAY

## 2020-08-16 PROCEDURE — 80048 BASIC METABOLIC PNL TOTAL CA: CPT

## 2020-08-16 PROCEDURE — 2500000003 HC RX 250 WO HCPCS: Performed by: STUDENT IN AN ORGANIZED HEALTH CARE EDUCATION/TRAINING PROGRAM

## 2020-08-16 PROCEDURE — 31720 CLEARANCE OF AIRWAYS: CPT

## 2020-08-16 PROCEDURE — 82550 ASSAY OF CK (CPK): CPT

## 2020-08-16 PROCEDURE — 80053 COMPREHEN METABOLIC PANEL: CPT

## 2020-08-16 PROCEDURE — 83735 ASSAY OF MAGNESIUM: CPT

## 2020-08-16 PROCEDURE — 82330 ASSAY OF CALCIUM: CPT

## 2020-08-16 RX ORDER — FUROSEMIDE 10 MG/ML
20 INJECTION INTRAMUSCULAR; INTRAVENOUS ONCE
Status: DISCONTINUED | OUTPATIENT
Start: 2020-08-16 | End: 2020-08-18

## 2020-08-16 RX ORDER — MAGNESIUM SULFATE 1 G/100ML
1 INJECTION INTRAVENOUS ONCE
Status: COMPLETED | OUTPATIENT
Start: 2020-08-16 | End: 2020-08-16

## 2020-08-16 RX ORDER — FUROSEMIDE 10 MG/ML
40 INJECTION INTRAMUSCULAR; INTRAVENOUS ONCE
Status: DISCONTINUED | OUTPATIENT
Start: 2020-08-16 | End: 2020-08-16

## 2020-08-16 RX ORDER — POTASSIUM CHLORIDE 29.8 MG/ML
40 INJECTION INTRAVENOUS ONCE
Status: COMPLETED | OUTPATIENT
Start: 2020-08-16 | End: 2020-08-16

## 2020-08-16 RX ORDER — METOPROLOL TARTRATE 5 MG/5ML
5 INJECTION INTRAVENOUS ONCE
Status: COMPLETED | OUTPATIENT
Start: 2020-08-16 | End: 2020-08-16

## 2020-08-16 RX ORDER — FENTANYL CITRATE 50 UG/ML
100 INJECTION, SOLUTION INTRAMUSCULAR; INTRAVENOUS EVERY 4 HOURS PRN
Status: DISCONTINUED | OUTPATIENT
Start: 2020-08-16 | End: 2020-08-16

## 2020-08-16 RX ORDER — FENTANYL CITRATE 50 UG/ML
50 INJECTION, SOLUTION INTRAMUSCULAR; INTRAVENOUS EVERY 4 HOURS PRN
Status: DISCONTINUED | OUTPATIENT
Start: 2020-08-16 | End: 2020-08-16

## 2020-08-16 RX ORDER — FUROSEMIDE 10 MG/ML
20 INJECTION INTRAMUSCULAR; INTRAVENOUS ONCE
Status: COMPLETED | OUTPATIENT
Start: 2020-08-16 | End: 2020-08-16

## 2020-08-16 RX ORDER — CHLORHEXIDINE GLUCONATE 0.12 MG/ML
15 RINSE ORAL 2 TIMES DAILY
Status: DISCONTINUED | OUTPATIENT
Start: 2020-08-16 | End: 2020-08-26 | Stop reason: HOSPADM

## 2020-08-16 RX ADMIN — PIPERACILLIN AND TAZOBACTAM 3.38 G: 3; .375 INJECTION, POWDER, FOR SOLUTION INTRAVENOUS at 06:17

## 2020-08-16 RX ADMIN — FUROSEMIDE 20 MG: 10 INJECTION, SOLUTION INTRAMUSCULAR; INTRAVENOUS at 13:00

## 2020-08-16 RX ADMIN — SODIUM CHLORIDE, PRESERVATIVE FREE 10 ML: 5 INJECTION INTRAVENOUS at 20:27

## 2020-08-16 RX ADMIN — PIPERACILLIN AND TAZOBACTAM 3.38 G: 3; .375 INJECTION, POWDER, FOR SOLUTION INTRAVENOUS at 16:47

## 2020-08-16 RX ADMIN — PIPERACILLIN AND TAZOBACTAM 3.38 G: 3; .375 INJECTION, POWDER, FOR SOLUTION INTRAVENOUS at 23:06

## 2020-08-16 RX ADMIN — SODIUM CHLORIDE, PRESERVATIVE FREE 10 ML: 5 INJECTION INTRAVENOUS at 07:57

## 2020-08-16 RX ADMIN — POTASSIUM PHOSPHATE, MONOBASIC AND POTASSIUM PHOSPHATE, DIBASIC 15 MMOL: 224; 236 INJECTION, SOLUTION, CONCENTRATE INTRAVENOUS at 15:00

## 2020-08-16 RX ADMIN — SODIUM CHLORIDE: 9 INJECTION, SOLUTION INTRAVENOUS at 04:23

## 2020-08-16 RX ADMIN — Medication 100 MG: at 20:27

## 2020-08-16 RX ADMIN — ENOXAPARIN SODIUM 30 MG: 30 INJECTION SUBCUTANEOUS at 07:57

## 2020-08-16 RX ADMIN — Medication 100 MCG/HR: at 18:27

## 2020-08-16 RX ADMIN — POTASSIUM PHOSPHATE, MONOBASIC AND POTASSIUM PHOSPHATE, DIBASIC 15 MMOL: 224; 236 INJECTION, SOLUTION, CONCENTRATE INTRAVENOUS at 10:33

## 2020-08-16 RX ADMIN — Medication 100 MG: at 07:57

## 2020-08-16 RX ADMIN — CHLORHEXIDINE GLUCONATE 15 ML: 1.2 RINSE ORAL at 20:28

## 2020-08-16 RX ADMIN — METOPROLOL TARTRATE 5 MG: 5 INJECTION, SOLUTION INTRAVENOUS at 16:48

## 2020-08-16 RX ADMIN — MAGNESIUM SULFATE HEPTAHYDRATE 1 G: 1 INJECTION, SOLUTION INTRAVENOUS at 14:04

## 2020-08-16 RX ADMIN — ACETAMINOPHEN 650 MG: 325 TABLET ORAL at 04:15

## 2020-08-16 RX ADMIN — FENTANYL CITRATE 100 MCG: 50 INJECTION, SOLUTION INTRAMUSCULAR; INTRAVENOUS at 17:22

## 2020-08-16 RX ADMIN — FAMOTIDINE 20 MG: 10 INJECTION, SOLUTION INTRAVENOUS at 20:27

## 2020-08-16 RX ADMIN — POTASSIUM CHLORIDE 40 MEQ: 29.8 INJECTION, SOLUTION INTRAVENOUS at 20:38

## 2020-08-16 RX ADMIN — Medication 100 MCG/HR: at 09:18

## 2020-08-16 RX ADMIN — Medication 100 MCG/HR: at 22:48

## 2020-08-16 RX ADMIN — FAMOTIDINE 20 MG: 10 INJECTION, SOLUTION INTRAVENOUS at 07:57

## 2020-08-16 RX ADMIN — Medication 5 MG/HR: at 18:26

## 2020-08-16 ASSESSMENT — PULMONARY FUNCTION TESTS
PIF_VALUE: 20
PIF_VALUE: 16
PIF_VALUE: 13
PIF_VALUE: 21
PIF_VALUE: 19
PIF_VALUE: 21
PIF_VALUE: 22
PIF_VALUE: 21
PIF_VALUE: 20

## 2020-08-16 NOTE — PLAN OF CARE
Problem: OXYGENATION/RESPIRATORY FUNCTION  Goal: Patient will maintain patent airway  8/15/2020 2225 by Osiris Martinez RCP  Outcome: Ongoing     Problem: OXYGENATION/RESPIRATORY FUNCTION  Goal: Patient will achieve/maintain normal respiratory rate/effort  Description: Respiratory rate and effort will be within normal limits for the patient  8/15/2020 2225 by Osiris Martinez RCP  Outcome: Ongoing     Problem: MECHANICAL VENTILATION  Goal: Patient will maintain patent airway  8/15/2020 2225 by Osiris Martinez RCP  Outcome: Ongoing     Problem: MECHANICAL VENTILATION  Goal: Oral health is maintained or improved  8/15/2020 2225 by Osiris Martinez RCP  Outcome: Ongoing     Problem: MECHANICAL VENTILATION  Goal: ET tube will be managed safely  8/15/2020 2225 by Osiris Martinez RCP  Outcome: Ongoing     Problem: MECHANICAL VENTILATION  Goal: Ability to express needs and understand communication  8/15/2020 2225 by Osiris Martinez RCP  Outcome: Ongoing     Problem: MECHANICAL VENTILATION  Goal: Mobility/activity is maintained at optimum level for patient  8/15/2020 2225 by Osiris Martinez RCP  Outcome: Ongoing     Problem: SKIN INTEGRITY  Goal: Skin integrity is maintained or improved  8/15/2020 2225 by Osiris Martinez RCP  Outcome: Ongoing

## 2020-08-16 NOTE — PROGRESS NOTES
INTENSIVE CARE UNIT  Resident Physician Progress Note    Patient - Wan Padilla  Date of Admission -  2020  5:18 AM  Date of Evaluation -  2020  Room and Bed Number -  0127/0127-01   Hospital Day - 2    Cc- cardiac arrest due to overdose   SUBJECTIVE:     OVERNIGHT EVENTS:    Patient had a temperature spike last night and repeat blood cultures were not sent in the morning.     TODAY:    AWAKE & FOLLOWING COMMANDS:  [] No   [x] Yes    SECRETIONS Amount:  [] Small [x] Moderate  [] Large  [] None  Color:     [] White [] Colored  [] Bloody    SEDATION:  RAAS Score:  [] Propofol gtt  [x] Versed gtt  [] Ativan gtt   [] No Sedation    PARALYZED:  [x] No    [] Yes    VASOPRESSORS:  [x] No    [] Yes  [] Levophed [] Dopamine [] Vasopressin  [] Dobutamine [] Phenylephrine [] Epinephrine    Ros unable to perform due to intubation and sedation    OBJECTIVE:     VITAL SIGNS:  /61   Pulse 80   Temp 99.3 °F (37.4 °C) (Oral)   Resp 20   Ht 5' 4\" (1.626 m)   Wt 147 lb 11.3 oz (67 kg)   SpO2 99%   BMI 25.35 kg/m²   Tmax over 24 hours:  Temp (24hrs), Av.6 °F (37.6 °C), Min:98.8 °F (37.1 °C), Max:100.8 °F (38.2 °C)      Patient Vitals for the past 8 hrs:   BP Temp Temp src Pulse Resp SpO2   20 0800 101/61 99.3 °F (37.4 °C) Oral 80 20 --   20 0600 109/64 99 °F (37.2 °C) Axillary 85 20 99 %   20 0500 -- -- -- 84 26 100 %   20 0400 105/66 100.8 °F (38.2 °C) Axillary 84 26 99 %   20 0332 -- -- -- 81 26 100 %   20 0300 -- -- -- 81 25 100 %   20 0200 107/60 -- -- 83 26 100 %         Intake/Output Summary (Last 24 hours) at 2020 0920  Last data filed at 2020 0800  Gross per 24 hour   Intake 1527 ml   Output 2012 ml   Net -485 ml     Date 20 0000 - 20 2359   Shift 2007-1674 6335-6810 8942-6988 24 Hour Total   INTAKE   I.V.(mL/kg) 631(9.4)   631(9.4)   Shift Total(mL/kg) 631(9.4)   631(9.4)   OUTPUT   Urine(mL/kg/hr) 587(1.1) 100  687   Emesis/NG mode, FiO2 99 %, PEEP 5 , Respiratory Rate 40, Tidal Volume 9.2  Vent Information  $Ventilation: $Subsequent Day  Skin Assessment: Clean, dry, & intact  Suction Catheter Diameter: 14  Equipment Changed: HME  Vent Type: Servo i  Vent Mode: PRVC  Vt Ordered: 490 mL  Rate Set: 20 bmp  Pressure Support: (S) 7 cmH20  FiO2 : 40 %  SpO2: 99 %  SpO2/FiO2 ratio: 247.5  Sensitivity: 5  PEEP/CPAP: 5  I Time/ I Time %: 0.8 s  Humidification Source: HME  Additional Respiratory  Assessments  Pulse: 80  Resp: 20  SpO2: 99 %  End Tidal CO2: 30 (%)  Position: Semi-Omer's  Humidification Source: HME  Oral Care Completed?: Yes  Oral Care: Lip moisturizer applied, Mouth suctioned, Mouthwash, Mouth swabbed, Mouth moisturizer  Subglottic Suction Done?: Yes  Cuff Pressure (cm H2O): (mlt)    ABGs:   Arterial Blood Gas result:  pH 7.419 ; pCO2 36.7; pO2 112.8; HCO3 23.8; ; %O2 Sat 99  Lab Results   Component Value Date    PHART 7.218 08/14/2020    ZBT0PSD 56.3 08/14/2020    PO2ART 82.3 08/14/2020    OHL7EDA 22.9 08/14/2020    TWS4KDW 25 08/16/2020    Y5YMVLKQ 93.1 08/14/2020    FIO2 40.0 08/16/2020         DATA:  Complete Blood Count:   Recent Labs     08/14/20  0848 08/15/20  0457 08/16/20  0449   WBC 13.1* 23.8* 15.3*   RBC 5.09 4.11* 3.58*   HGB 16.1 13.2 11.2*   HCT 49.5 40.2* 33.9*   MCV 97.2 97.8 94.7   MCH 31.6 32.1 31.3   MCHC 32.5 32.8 33.0   RDW 13.1 13.5 13.3    230 192   MPV 9.8 10.3 10.0        Last 3 Blood Glucose:   Recent Labs     08/13/20  2339 08/14/20  0848 08/15/20  0457 08/16/20  0449   GLUCOSE 316* 106* 137* 100*        PT/INR:    Lab Results   Component Value Date    PROTIME 14.2 08/13/2020    INR 1.1 08/13/2020     PTT:    Lab Results   Component Value Date    APTT 53.2 08/15/2020       Comprehensive Metabolic Profile:   Recent Labs     08/14/20  0848 08/15/20  0457 08/15/20  1807 08/16/20  0449    139  --  133*   K 4.3 4.4  --  3.4*    104  --  102   CO2 22 25  --  20   BUN 14 11  --  11 CREATININE 0.92 0.93  --  1.00   GLUCOSE 106* 137*  --  100*   CALCIUM 8.1* 8.0*  --  8.5*   PROT 6.0* 5.6* 5.4* 5.7*   LABALBU 3.5 3.0* 2.7* 2.8*   BILITOT 0.89 0.93 1.05 1.60*   ALKPHOS 66 59 63 66   AST 54* 26 20 18   ALT 50* 36 27 24      Magnesium:   Lab Results   Component Value Date    MG 1.8 08/16/2020    MG 1.9 08/15/2020    MG 1.8 08/14/2020     Phosphorus:   Lab Results   Component Value Date    PHOS 1.0 08/16/2020    PHOS 2.1 08/15/2020    PHOS 3.3 08/14/2020     Ionized Calcium:   Lab Results   Component Value Date    CAION 1.14 08/16/2020    CAION 1.09 08/15/2020    CAION 1.06 08/14/2020        Urinalysis:   Lab Results   Component Value Date    NITRU NEGATIVE 08/14/2020    COLORU YELLOW 08/14/2020    PHUR 6.0 08/14/2020    WBCUA 0 TO 2 08/14/2020    RBCUA 2 TO 5 08/14/2020    MUCUS NOT REPORTED 08/14/2020    TRICHOMONAS NOT REPORTED 08/14/2020    YEAST NOT REPORTED 08/14/2020    BACTERIA NOT REPORTED 08/14/2020    SPECGRAV 1.014 08/14/2020    LEUKOCYTESUR NEGATIVE 08/14/2020    UROBILINOGEN Normal 08/14/2020    BILIRUBINUR NEGATIVE 08/14/2020    GLUCOSEU 2+ 08/14/2020    KETUA NEGATIVE 08/14/2020    AMORPHOUS 1+ 08/14/2020       HgBA1c:  No results found for: LABA1C  TSH:  No results found for: TSH  Lactic Acid:   Lab Results   Component Value Date    LACTA NOT REPORTED 08/15/2020    LACTA NOT REPORTED 08/15/2020    LACTA NOT REPORTED 08/15/2020      Troponin: No results for input(s): TROPONINI in the last 72 hours. Radiology/Imaging:  No infiltrate    ASSESSMENT:     Patient Active Problem List    Diagnosis Date Noted    Acute respiratory failure with hypoxia ([address] 08/14/2020    Respiratory depression 08/10/2019          PLAN:     WEAN PER PROTOCOL:  [] No   [x] Yes  [] N/A    ICU PROPHYLAXIS:  Stress ulcer:  [] PPI Agent  [] O2Rdhhu [] Sucralfate  [] Other:  VTE:   [] Enoxaparin  [x] Unfract.  Heparin Subcut  [] EPC Cuffs    NUTRITION:  [] NPO [] Tube Feeding (Specify: ) [] TPN  [] PO    HOME MEDS RECONCILED: [] No  [x] Yes    CONSULTATION NEEDED:  [] No  [] Yes    FAMILY UPDATED:    [x] No  [] Yes     TRANSFER OUT OF ICU:   [x] No  [] Yes    Plan:       1) Toxic  encephalopathy secondary to drug overdose  -- Patient had a history of drug use   --  Possible extubation today  and sedated with meduzolam and propofal was stopped. 2) acute hypoxic respiratory failure secondary to drug overdose    --Will extubation today  --ABG today showed pH of 7.4 PO2 of 112.8 PCO2 of 36.7.  -- 20 mg Lasix   -- Electrolytes check in the evening    3) septic and  cardiogenic shock    --sepsis due to [place]  Aspiration pneumonia   --Nasal culture was positive for staph have to rule out whether it is MRSA  -- will continue the patient on penicillin tazobactam  --Norepinephrine was discontinued for hypotension which was present yesterday. --Total intake output in the last 24 hours is net negative of 485ml  --Patient is on IV normal saline as of now      4) NSTEMI secondary to demand ischemia  Acute chf systolic   --Status resolved. 5) hypokalemia,  Hypophosphatemia and hypomagnesemia  --Given  potassium phosphate 15 mmol iv   -- Magnesiumsulfate 1 g in dextrose 5% 100 mL  -  Electrolyte Levels in the evening. Dl Mendieta MD  Internal Medicine Resident  [address]  8/16/2020, 9:20 AM EDT  Attending Physician Statement  I have discussed the care of Dl Rahman, including pertinent history and exam findings,  with the resident. I have seen and examined the patient and the key elements of all parts of the encounter have been performed by me. I agree with the assessment, plan and orders as documented by the resident with additions .   Off pressors   Demario sbt   No wob   Following commands   Extubated without immediate complications   But later in day had increased wob and somolent so re intubated     Total critical care time caring for this patient with life threatening, unstable organ failure, including direct patient contact, management of life support systems, review of data including imaging and labs, discussions with other team members and physicians at least 28   Min so far today, excluding procedures. Treatment plan Discussed with nursing staff in detail , all questions answered . Electronically signed by Erika Alvarado MD on   8/16/20 at 9:44 PM EDT    Please note that this chart was generated using voice recognition Dragon dictation software. Although every effort was made to ensure the accuracy of this automated transcription, some errors in transcription may have occurred.

## 2020-08-16 NOTE — FLOWSHEET NOTE
Writer spoke with pts mother who visited this evening. Pts mom requested that only she be the one to get information. Writer also updated mother and answered all questions at this time.     Marlen Lou RN

## 2020-08-16 NOTE — PROCEDURES
Date: 8/16/2020    PROCEDURE: Endotracheal intubation    INDICATIONS: acute respiratory failure     CONTRAINDICATIONS: None    PROCEDURE : Dr. Ariana Murray under supervision of Dr. Nba Avalos: Implied due to emergent nature of the procedure. A time-out was completed verifying correct patient, procedure, site, positioning, and implant(s) or special equipment if applicable. PROCEDURE SUMMARY:    The patient was pre-oxygenated as per RSI protocol. 15 mg of Etomidate and 100 mg succhinylcholine were administered. A number 4 glidoscope blade was inserted into the oropharynx at which time the vocal cords were visualized. The oropharynx was suctioned to remove secretions. A 8.0 mm endotracheal tube was inserted and visualized going through the vocal cords into the trachea. Condensation was visualized in the endotracheal tube. Colorimetric change was visualized on the CO2 meter. Breath sounds were heard in both lung fields equally. The endotracheal tube was placed at 24 cm. However, there was a notable cuff leak presumed to be from a non-functioning balloon. Therefore, a 7.5 cm ET tube was exchanged over a cook catheter. This was done under the visualization of a glide-scope with dose of rocuronium . After exchange, color change and bilateral breath sounds were appreciated. Chest X-ray was taken and confirms good tube placement. COMPLICATIONS: None    ESTIMATED BLOOD LOSS: None      Kelly Gonzalez MD  Internal Medicine Resident, PGY-2  [facility]; [facility], [facility]  8/16/2020, 6:24 PM   Attending Physician Statement  I have discussed the care of Alex Dhaliwal, including pertinent history and exam findings,  with the resident. I have seen and examined the patient and the key elements of all parts of the encounter have been performed by me. I agree with the assessment, plan and orders as documented by the resident with additions .       Treatment plan Discussed with nursing staff in

## 2020-08-17 PROBLEM — A41.50 SEPSIS DUE TO GRAM-NEGATIVE ORGANISM WITH SEPTIC SHOCK (HCC): Status: ACTIVE | Noted: 2020-08-17

## 2020-08-17 PROBLEM — R65.21 SEPSIS DUE TO GRAM-NEGATIVE ORGANISM WITH SEPTIC SHOCK (HCC): Status: ACTIVE | Noted: 2020-08-17

## 2020-08-17 LAB
ABSOLUTE EOS #: 0.16 K/UL (ref 0–0.44)
ABSOLUTE IMMATURE GRANULOCYTE: 0.06 K/UL (ref 0–0.3)
ABSOLUTE LYMPH #: 1.44 K/UL (ref 1.1–3.7)
ABSOLUTE MONO #: 0.76 K/UL (ref 0.1–1.2)
ALBUMIN SERPL-MCNC: 2.9 G/DL (ref 3.5–5.2)
ALBUMIN/GLOBULIN RATIO: 0.7 (ref 1–2.5)
ALLEN TEST: ABNORMAL
ALLEN TEST: ABNORMAL
ALLEN TEST: NORMAL
ALP BLD-CCNC: 76 U/L (ref 40–129)
ALT SERPL-CCNC: 21 U/L (ref 5–41)
ANION GAP SERPL CALCULATED.3IONS-SCNC: 12 MMOL/L (ref 9–17)
AST SERPL-CCNC: 19 U/L
BASOPHILS # BLD: 0 % (ref 0–2)
BASOPHILS ABSOLUTE: <0.03 K/UL (ref 0–0.2)
BILIRUB SERPL-MCNC: 1.3 MG/DL (ref 0.3–1.2)
BUN BLDV-MCNC: 11 MG/DL (ref 6–20)
BUN/CREAT BLD: ABNORMAL (ref 9–20)
CALCIUM IONIZED: 1.05 MMOL/L (ref 1.13–1.33)
CALCIUM SERPL-MCNC: 8.8 MG/DL (ref 8.6–10.4)
CHLORIDE BLD-SCNC: 100 MMOL/L (ref 98–107)
CO2: 22 MMOL/L (ref 20–31)
CREAT SERPL-MCNC: 1.03 MG/DL (ref 0.7–1.2)
CULTURE: ABNORMAL
DIFFERENTIAL TYPE: ABNORMAL
DIRECT EXAM: ABNORMAL
EOSINOPHILS RELATIVE PERCENT: 1 % (ref 1–4)
FIO2: 40
FIO2: 40
FIO2: 45
GFR AFRICAN AMERICAN: >60 ML/MIN
GFR NON-AFRICAN AMERICAN: >60 ML/MIN
GFR SERPL CREATININE-BSD FRML MDRD: ABNORMAL ML/MIN/{1.73_M2}
GFR SERPL CREATININE-BSD FRML MDRD: ABNORMAL ML/MIN/{1.73_M2}
GLUCOSE BLD-MCNC: 105 MG/DL (ref 70–99)
GLUCOSE BLD-MCNC: 110 MG/DL (ref 74–100)
GLUCOSE BLD-MCNC: 115 MG/DL (ref 74–100)
GLUCOSE BLD-MCNC: 138 MG/DL (ref 74–100)
HCT VFR BLD CALC: 38.3 % (ref 40.7–50.3)
HEMOGLOBIN: 12.5 G/DL (ref 13–17)
IMMATURE GRANULOCYTES: 1 %
LYMPHOCYTES # BLD: 11 % (ref 24–43)
Lab: ABNORMAL
Lab: ABNORMAL
MAGNESIUM: 2.4 MG/DL (ref 1.6–2.6)
MCH RBC QN AUTO: 31.9 PG (ref 25.2–33.5)
MCHC RBC AUTO-ENTMCNC: 32.6 G/DL (ref 28.4–34.8)
MCV RBC AUTO: 97.7 FL (ref 82.6–102.9)
MODE: ABNORMAL
MODE: ABNORMAL
MODE: NORMAL
MONOCYTES # BLD: 6 % (ref 3–12)
NEGATIVE BASE EXCESS, ART: ABNORMAL (ref 0–2)
NEGATIVE BASE EXCESS, ART: ABNORMAL (ref 0–2)
NEGATIVE BASE EXCESS, ART: NORMAL (ref 0–2)
NRBC AUTOMATED: 0 PER 100 WBC
O2 DEVICE/FLOW/%: ABNORMAL
O2 DEVICE/FLOW/%: ABNORMAL
O2 DEVICE/FLOW/%: NORMAL
PATIENT TEMP: 37
PATIENT TEMP: 37
PATIENT TEMP: NORMAL
PDW BLD-RTO: 13.1 % (ref 11.8–14.4)
PHOSPHORUS: 2.8 MG/DL (ref 2.5–4.5)
PLATELET # BLD: 211 K/UL (ref 138–453)
PLATELET ESTIMATE: ABNORMAL
PMV BLD AUTO: 10.5 FL (ref 8.1–13.5)
POC HCO3: 22.5 MMOL/L (ref 21–28)
POC HCO3: 26.7 MMOL/L (ref 21–28)
POC HCO3: 27.6 MMOL/L (ref 21–28)
POC O2 SATURATION: 97 % (ref 94–98)
POC O2 SATURATION: 98 % (ref 94–98)
POC O2 SATURATION: 99 % (ref 94–98)
POC PCO2 TEMP: ABNORMAL MM HG
POC PCO2 TEMP: ABNORMAL MM HG
POC PCO2 TEMP: NORMAL MM HG
POC PCO2: 27 MM HG (ref 35–48)
POC PCO2: 41.8 MM HG (ref 35–48)
POC PCO2: 57.5 MM HG (ref 35–48)
POC PH TEMP: ABNORMAL
POC PH TEMP: ABNORMAL
POC PH TEMP: NORMAL
POC PH: 7.29 (ref 7.35–7.45)
POC PH: 7.41 (ref 7.35–7.45)
POC PH: 7.53 (ref 7.35–7.45)
POC PO2 TEMP: ABNORMAL MM HG
POC PO2 TEMP: ABNORMAL MM HG
POC PO2 TEMP: NORMAL MM HG
POC PO2: 161.6 MM HG (ref 83–108)
POC PO2: 84.3 MM HG (ref 83–108)
POC PO2: 91.1 MM HG (ref 83–108)
POSITIVE BASE EXCESS, ART: 0 (ref 0–3)
POSITIVE BASE EXCESS, ART: 1 (ref 0–3)
POSITIVE BASE EXCESS, ART: 2 (ref 0–3)
POTASSIUM SERPL-SCNC: 4 MMOL/L (ref 3.7–5.3)
RBC # BLD: 3.92 M/UL (ref 4.21–5.77)
RBC # BLD: ABNORMAL 10*6/UL
SAMPLE SITE: ABNORMAL
SAMPLE SITE: ABNORMAL
SAMPLE SITE: NORMAL
SEG NEUTROPHILS: 81 % (ref 36–65)
SEGMENTED NEUTROPHILS ABSOLUTE COUNT: 10.2 K/UL (ref 1.5–8.1)
SODIUM BLD-SCNC: 134 MMOL/L (ref 135–144)
SPECIMEN DESCRIPTION: ABNORMAL
SPECIMEN DESCRIPTION: ABNORMAL
TCO2 (CALC), ART: 23 MMOL/L (ref 22–29)
TCO2 (CALC), ART: 28 MMOL/L (ref 22–29)
TCO2 (CALC), ART: 29 MMOL/L (ref 22–29)
TOTAL CK: 533 U/L (ref 39–308)
TOTAL PROTEIN: 7 G/DL (ref 6.4–8.3)
WBC # BLD: 12.6 K/UL (ref 3.5–11.3)
WBC # BLD: ABNORMAL 10*3/UL

## 2020-08-17 PROCEDURE — 6360000002 HC RX W HCPCS: Performed by: STUDENT IN AN ORGANIZED HEALTH CARE EDUCATION/TRAINING PROGRAM

## 2020-08-17 PROCEDURE — 36600 WITHDRAWAL OF ARTERIAL BLOOD: CPT

## 2020-08-17 PROCEDURE — 2500000003 HC RX 250 WO HCPCS: Performed by: INTERNAL MEDICINE

## 2020-08-17 PROCEDURE — 2700000000 HC OXYGEN THERAPY PER DAY

## 2020-08-17 PROCEDURE — 83735 ASSAY OF MAGNESIUM: CPT

## 2020-08-17 PROCEDURE — 36415 COLL VENOUS BLD VENIPUNCTURE: CPT

## 2020-08-17 PROCEDURE — 94770 HC ETCO2 MONITOR DAILY: CPT

## 2020-08-17 PROCEDURE — 99291 CRITICAL CARE FIRST HOUR: CPT | Performed by: INTERNAL MEDICINE

## 2020-08-17 PROCEDURE — 82330 ASSAY OF CALCIUM: CPT

## 2020-08-17 PROCEDURE — 82803 BLOOD GASES ANY COMBINATION: CPT

## 2020-08-17 PROCEDURE — 84100 ASSAY OF PHOSPHORUS: CPT

## 2020-08-17 PROCEDURE — 2000000000 HC ICU R&B

## 2020-08-17 PROCEDURE — 2580000003 HC RX 258: Performed by: STUDENT IN AN ORGANIZED HEALTH CARE EDUCATION/TRAINING PROGRAM

## 2020-08-17 PROCEDURE — 85025 COMPLETE CBC W/AUTO DIFF WBC: CPT

## 2020-08-17 PROCEDURE — 6370000000 HC RX 637 (ALT 250 FOR IP): Performed by: STUDENT IN AN ORGANIZED HEALTH CARE EDUCATION/TRAINING PROGRAM

## 2020-08-17 PROCEDURE — 6370000000 HC RX 637 (ALT 250 FOR IP): Performed by: INTERNAL MEDICINE

## 2020-08-17 PROCEDURE — 82550 ASSAY OF CK (CPK): CPT

## 2020-08-17 PROCEDURE — 2500000003 HC RX 250 WO HCPCS: Performed by: STUDENT IN AN ORGANIZED HEALTH CARE EDUCATION/TRAINING PROGRAM

## 2020-08-17 PROCEDURE — 94003 VENT MGMT INPAT SUBQ DAY: CPT

## 2020-08-17 PROCEDURE — 80053 COMPREHEN METABOLIC PANEL: CPT

## 2020-08-17 PROCEDURE — 94761 N-INVAS EAR/PLS OXIMETRY MLT: CPT

## 2020-08-17 RX ORDER — DEXMEDETOMIDINE HYDROCHLORIDE 4 UG/ML
0.2 INJECTION, SOLUTION INTRAVENOUS CONTINUOUS
Status: DISCONTINUED | OUTPATIENT
Start: 2020-08-17 | End: 2020-08-18

## 2020-08-17 RX ORDER — SODIUM CHLORIDE 9 MG/ML
INJECTION, SOLUTION INTRAVENOUS CONTINUOUS
Status: DISCONTINUED | OUTPATIENT
Start: 2020-08-17 | End: 2020-08-26 | Stop reason: HOSPADM

## 2020-08-17 RX ADMIN — Medication 10 MG/HR: at 23:48

## 2020-08-17 RX ADMIN — FAMOTIDINE 20 MG: 10 INJECTION, SOLUTION INTRAVENOUS at 08:46

## 2020-08-17 RX ADMIN — Medication 10 MG/HR: at 22:44

## 2020-08-17 RX ADMIN — SODIUM CHLORIDE, PRESERVATIVE FREE 10 ML: 5 INJECTION INTRAVENOUS at 08:46

## 2020-08-17 RX ADMIN — FAMOTIDINE 20 MG: 10 INJECTION, SOLUTION INTRAVENOUS at 20:38

## 2020-08-17 RX ADMIN — Medication 125 MCG/HR: at 14:25

## 2020-08-17 RX ADMIN — Medication 100 MG: at 20:38

## 2020-08-17 RX ADMIN — Medication 8 MG/HR: at 14:25

## 2020-08-17 RX ADMIN — SODIUM CHLORIDE, PRESERVATIVE FREE 10 ML: 5 INJECTION INTRAVENOUS at 20:38

## 2020-08-17 RX ADMIN — CHLORHEXIDINE GLUCONATE 15 ML: 1.2 RINSE ORAL at 08:46

## 2020-08-17 RX ADMIN — ENOXAPARIN SODIUM 30 MG: 30 INJECTION SUBCUTANEOUS at 08:46

## 2020-08-17 RX ADMIN — Medication 100 MCG/HR: at 06:29

## 2020-08-17 RX ADMIN — PIPERACILLIN AND TAZOBACTAM 3.38 G: 3; .375 INJECTION, POWDER, FOR SOLUTION INTRAVENOUS at 15:48

## 2020-08-17 RX ADMIN — CHLORHEXIDINE GLUCONATE 15 ML: 1.2 RINSE ORAL at 20:37

## 2020-08-17 RX ADMIN — PIPERACILLIN AND TAZOBACTAM 3.38 G: 3; .375 INJECTION, POWDER, FOR SOLUTION INTRAVENOUS at 22:45

## 2020-08-17 RX ADMIN — PIPERACILLIN AND TAZOBACTAM 3.38 G: 3; .375 INJECTION, POWDER, FOR SOLUTION INTRAVENOUS at 06:21

## 2020-08-17 RX ADMIN — Medication 100 MG: at 08:46

## 2020-08-17 RX ADMIN — DEXMEDETOMIDINE HYDROCHLORIDE 0.2 MCG/KG/HR: 400 INJECTION INTRAVENOUS at 21:18

## 2020-08-17 RX ADMIN — SODIUM CHLORIDE: 9 INJECTION, SOLUTION INTRAVENOUS at 12:07

## 2020-08-17 ASSESSMENT — PULMONARY FUNCTION TESTS
PIF_VALUE: 20
PIF_VALUE: 19
PIF_VALUE: 18
PIF_VALUE: 17
PIF_VALUE: 18
PIF_VALUE: 16
PIF_VALUE: 18
PIF_VALUE: 18
PIF_VALUE: 19
PIF_VALUE: 18
PIF_VALUE: 20
PIF_VALUE: 19
PIF_VALUE: 18
PIF_VALUE: 19
PIF_VALUE: 18
PIF_VALUE: 19
PIF_VALUE: 20

## 2020-08-17 NOTE — PLAN OF CARE
Problem: OXYGENATION/RESPIRATORY FUNCTION  Goal: Patient will maintain patent airway  8/17/2020 0747 by Edilberto Osorio RCP  Outcome: Ongoing  8/17/2020 0515 by Madelyn Plummer RN  Outcome: Ongoing  8/17/2020 0223 by Gregory Li RCP  Outcome: Ongoing  Goal: Patient will achieve/maintain normal respiratory rate/effort  Description: Respiratory rate and effort will be within normal limits for the patient  8/17/2020 0747 by Edilberto Osorio RCP  Outcome: Ongoing  8/17/2020 0515 by Madelyn Plummer RN  Outcome: Ongoing  8/17/2020 0223 by Gregory iL RCP  Outcome: Ongoing     Problem: MECHANICAL VENTILATION  Goal: Patient will maintain patent airway  8/17/2020 0747 by Edilberto Osorio RCP  Outcome: Ongoing  8/17/2020 0515 by Madelyn Plummer RN  Outcome: Ongoing  8/17/2020 0223 by Gregory Li RCP  Outcome: Ongoing  Goal: Oral health is maintained or improved  8/17/2020 0747 by Edilberto Osorio RCP  Outcome: Ongoing  8/17/2020 0515 by Madelyn Plummer RN  Outcome: Ongoing  8/17/2020 0223 by Gregory Li RCP  Outcome: Ongoing  Goal: ET tube will be managed safely  8/17/2020 0747 by Edilberto Osorio RCP  Outcome: Ongoing  8/17/2020 0515 by Madelyn Plummer RN  Outcome: Ongoing  8/17/2020 0223 by Gregory Li RCP  Outcome: Ongoing  Goal: Ability to express needs and understand communication  8/17/2020 0747 by Edilberto Osorio RCP  Outcome: Ongoing  8/17/2020 0515 by Madelyn Plummer RN  Outcome: Ongoing  8/17/2020 0223 by Gregory Li RCP  Outcome: Ongoing  Goal: Mobility/activity is maintained at optimum level for patient  8/17/2020 0747 by Edilberto Osorio RCP  Outcome: Ongoing  8/17/2020 0515 by Madelyn Plummer RN  Outcome: Ongoing  8/17/2020 0223 by Gregory Li RCP  Outcome: Ongoing     Problem: SKIN INTEGRITY  Goal: Skin integrity is maintained or improved  8/17/2020 0747 by Edilberto Osorio RCP  Outcome: Ongoing  8/17/2020 0515 by Madelyn Plummer RN  Outcome: Ongoing  8/17/2020 0223 by Gregory Li, RCP  Outcome: Ongoing

## 2020-08-17 NOTE — PLAN OF CARE
Problem: OXYGENATION/RESPIRATORY FUNCTION  Goal: Patient will maintain patent airway  8/17/2020 0515 by Anaya La RN  Outcome: Ongoing  8/17/2020 0223 by Michael Flowers RCP  Outcome: Ongoing  Goal: Patient will achieve/maintain normal respiratory rate/effort  Description: Respiratory rate and effort will be within normal limits for the patient  8/17/2020 0515 by Anaya La RN  Outcome: Ongoing  8/17/2020 0223 by Michael Flowers RCP  Outcome: Ongoing     Problem: MECHANICAL VENTILATION  Goal: Patient will maintain patent airway  8/17/2020 0515 by Anaya La RN  Outcome: Ongoing  8/17/2020 0223 by Michael Flowers RCP  Outcome: Ongoing  Goal: Oral health is maintained or improved  8/17/2020 0515 by Anaya La RN  Outcome: Ongoing  8/17/2020 0223 by Michael Flowers RCP  Outcome: Ongoing  Goal: ET tube will be managed safely  8/17/2020 0515 by Anaya La RN  Outcome: Ongoing  8/17/2020 0223 by Michael Flowers RCP  Outcome: Ongoing  Goal: Ability to express needs and understand communication  8/17/2020 0515 by Anaya La RN  Outcome: Ongoing  8/17/2020 0223 by Michael Flowers RCP  Outcome: Ongoing  Goal: Mobility/activity is maintained at optimum level for patient  8/17/2020 0515 by Anaya La RN  Outcome: Ongoing  8/17/2020 0223 by Michael Flowers RCP  Outcome: Ongoing     Problem: SKIN INTEGRITY  Goal: Skin integrity is maintained or improved  8/17/2020 0515 by Anaya La RN  Outcome: Ongoing  8/17/2020 0223 by Michael Flowers RCP  Outcome: Ongoing     Problem: NUTRITION  Goal: Nutritional status is improving  Outcome: Ongoing     Problem: Skin Integrity:  Goal: Will show no infection signs and symptoms  Description: Will show no infection signs and symptoms  Outcome: Ongoing  Goal: Absence of new skin breakdown  Description: Absence of new skin breakdown  Outcome: Ongoing     Problem: Falls - Risk of:  Goal: Will remain free from falls  Description: Will remain free from falls  Outcome: Ongoing  Goal: Absence of physical injury  Description: Absence of physical injury  Outcome: Ongoing     Problem: Nutrition  Goal: Optimal nutrition therapy  Description: Nutrition Problem #1: Inadequate oral intake  Intervention: Food and/or Nutrient Delivery: Start Tube Feeding  Nutritional Goals: provide greater than 75% of nutrition needs     Outcome: Ongoing     Problem: Restraint Use - Nonviolent/Non-Self-Destructive Behavior:  Goal: Absence of restraint indications  Description: Absence of restraint indications  Outcome: Not Met This Shift  Goal: Absence of restraint-related injury  Description: Absence of restraint-related injury  Outcome: Not Met This Shift

## 2020-08-17 NOTE — FLOWSHEET NOTE
SPIRITUAL CARE DEPARTMENT - [address]  PROGRESS NOTE    Shift date: 08/16/2020  Shift day: Sunday   Shift # 2    Room # 0127/0127-01   Name: Amy Patterson            Age: 38 y.o. Gender: male          Buddhism: unknown   Place of Sabian[place]: unknown    Referral: Routine Visit    Admit Date & Time: 8/14/2020  5:18 AM    PATIENT/EVENT DESCRIPTION:  Amy Patterson is a 38 y.o. male very anxious and confused, no family at bedside only staff. SPIRITUAL ASSESSMENT/INTERVENTION:   was rounding and noticed staff struggling to keep pt. In bed. Pt. Anxious and moving arms and legs, unable to get comfortable. Staff decided to reintubate pt. To get him more comfortable.  offered silent blessing over pt. And offered emotional support for staff. SPIRITUAL CARE FOLLOW-UP PLAN:  Chaplains will remain available to offer spiritual and emotional support as needed.       Electronically signed by Leola Nichols on 8/16/2020 at 10:43 PM.  [facility]  [phone]     08/16/20 2241   Encounter Summary   Services provided to: Patient   Referral/Consult From: [facility]   Place of Sikh unknown   Continue Visiting   (08/16/2020)   Complexity of Encounter Moderate   Length of Encounter 15 minutes   Routine   Type Initial   Assessment Anxious   Intervention Prayer;Sustaining presence/ Ministry of presence   Outcome Comfort

## 2020-08-17 NOTE — PROGRESS NOTES
Critical Care Team - Daily Progress Note      Date and time: 8/17/2020 2:21 PM  Patient's name:  Varghese Rosales Record Number: [id number]  Patient's account/billing number: [de-identified]  Patient's YOB: 1985  Age: 38 y.o. Date of Admission: 8/14/2020  5:18 AM  Length of stay during current admission: 3      Primary Care Physician: No primary care provider on file. ICU Attending Physician: Marii Paiz MD    Code Status: Full Code    Reason for ICU admission:   SOB with Respiratory Distress requiring intubation due to Recreational drug overdose. SUBJECTIVE:     OVERNIGHT EVENTS:       Pt was weaned off intubation but developed tachyopnea and increased Work of breathing and was re intubated. Current Evaluation :      Vitals stable. Patient is off Levophed. Remains Sedated with Versad 1mg/ml  , Intubated and Ventilated, Pt responded to name and following commands. Ventilatory mode : PRVC with Fi02 of 30, PEEP of 5 , RR of 32, Minute Ventilation of 10.2 L  ABG PH of 7.4, sp02 91 , Pco2 41.8, Sat 97 , Bicarb 26.7.    continued on Fentanyl 20mcg and sedated with Versed. Heparin drip switched to subcutaneous heparin. Tolerating tube feeds well- minimal residual with rate of  84ml/kg  Total intake is 1692 and total output is 3760 post 20mg IV Furosemide in the last 24 hrs. Total net negative of 2066 since admission. Significant labs:   Repeat blood culture sent yesterday with no growth so far  Chest xray still has unchanged multifocal bilateral airspace disease right> left. Continued on Piperacillin and Tazobactem 3.375gm IV for Serratia Culture. Respiratory culture came back positive for staphylococcus Aureus which is methicillin susceptible. ROS:  Unable to attain negative history as the patient is intubated.     AWAKE & FOLLOWING COMMANDS:  [] No   [x] Yes    CURRENT VENTILATION STATUS:     [x] Ventilator  [] BIPAP  [] Nasal Cannula [] Room Air      IF INTUBATED, ET TUBE MARKING AT LOWER LIP:       cms    SECRETIONS Amount:  [] Small [x] Moderate  [] Large  [] None  Color:     [] White [] Colored  [] Bloody    SEDATION:  RAAS Score:  [] Propofol gtt  [x] Versed gtt  [] Ativan gtt   [] No Sedation    PARALYZED:  [x] No    [] Yes    DIARRHEA:                [x] No                [] Yes  (C. Difficile status: [] positive                                                                                                                       [] negative                                                                                                                     [] pending)    VASOPRESSORS:  [x] No    [] Yes    If yes -   [] Levophed       [] Dopamine     [] Vasopressin       [] Dobutamine  [] Phenylephrine         [] Epinephrine    CENTRAL LINES:     [] No   [x] Yes   (Date of Insertion:   )           If yes -     [] Right IJ     [x] Left IJ [] Right Femoral [] Left Femoral                   [] Right Subclavian [] Left Subclavian       SALAMANCA'S CATHETER:   [] No   [x] Yes  (Date of Insertion:   )     URINE OUTPUT:            [x] Good   [] Low              [] Anuric      OBJECTIVE:     VITAL SIGNS:  /65   Pulse 92   Temp 99.1 °F (37.3 °C) (Oral)   Resp 15   Ht 5' 4\" (1.626 m)   Wt 142 lb 10.2 oz (64.7 kg)   SpO2 99%   BMI 24.48 kg/m²   Tmax over 24 hours:  Temp (24hrs), Av.3 °F (37.4 °C), Min:98.6 °F (37 °C), Max:100.4 °F (38 °C)      Patient Vitals for the past 8 hrs:   BP Temp Temp src Pulse Resp SpO2   20 1129 -- -- -- 92 15 99 %   20 1000 108/65 -- -- 95 19 98 %   20 0900 121/71 -- -- 95 16 97 %   20 0800 120/78 99.1 °F (37.3 °C) Oral 89 15 97 %   20 0738 -- -- -- 92 16 97 %   20 0700 135/85 -- -- 103 24 100 %         Intake/Output Summary (Last 24 hours) at 2020 1421  Last data filed at 2020 1200  Gross per 24 hour   Intake 1518.9 ml   Output 2845 ml   Net -1326.1 ml     Date 20 0000 - 20 2359   [facility] [id number] 24 Hour Total   INTAKE   I.V.(mL/kg) 450(7) 200.9(3.1)  650. 9(10.1)   NG/GT(mL/kg) 54(0.8) 84(1.3)  138(2.1)   Shift Total(mL/kg) 504(7.8) 284. 9(4.4)  788. 9(12.2)   OUTPUT   Urine(mL/kg/hr) 200(0.4) 235  435   Shift Total(mL/kg) 200(3.1) 235(3.6)  435(6.7)   Weight (kg) 64.7 64.7 64.7 64.7     Wt Readings from Last 3 Encounters:   08/17/20 142 lb 10.2 oz (64.7 kg)   08/13/20 150 lb (68 kg)   08/10/19 126 lb 15.8 oz (57.6 kg)     Body mass index is 24.48 kg/m². PHYSICAL EXAM:  Constitutional: Status intubated and sedated   EENT: PERRLA, sclera clear, anicteric, oropharynx secretions are moderate, no lesions, neck supple with midline trachea. Respiratory: clear to auscultation, B/L Vesicular,  no wheezes or rales and unlabored breathing. No intercostal tenderness  Cardiovascular: regular rate and rhythm, normal S1, S2, no murmur noted and 2+ pulses throughout. Abdomen: soft, nontender, nondistended, no masses or organomegaly  Neurologic: Pt is on medazolam and follows my commands, no gross senso  Extremities:  peripheral pulses normal, no pedal edema, no clubbing or cyanosis No signs of swelling or inflammation in the joints.       MEDICATIONS:  Scheduled Meds:   furosemide  20 mg Intravenous Once    chlorhexidine  15 mL Mouth/Throat BID    piperacillin-tazobactam  3.375 g Intravenous Q8H    enoxaparin  30 mg Subcutaneous Daily    famotidine (PEPCID) injection  20 mg Intravenous BID    sodium chloride flush  10 mL Intravenous 2 times per day    sodium chloride flush  10 mL Intravenous 2 times per day    docusate  100 mg Per NG tube BID     Continuous Infusions:   sodium chloride 75 mL/hr at 08/17/20 1207    fentaNYL 125 mcg/hr (08/17/20 0832)    midazolam 8 mg/hr (08/17/20 0808)    sodium chloride 50 mL/hr at 08/16/20 2026     PRN Meds:   albuterol, 2.5 mg, As Directed RT PRN  sodium chloride flush, 10 mL, PRN  sodium chloride flush, 10 mL, PRN  acetaminophen, 650 mg, Q6H PRN Or  acetaminophen, 650 mg, Q6H PRN  polyethylene glycol, 17 g, Daily PRN  promethazine, 12.5 mg, Q6H PRN    Or  ondansetron, 4 mg, Q6H PRN  artificial tears, , PRN  ipratropium-albuterol, 1 ampule, Q4H PRN        SUPPORT DEVICES: [x] Ventilator [] BIPAP  [] Nasal Cannula [] Room Air    VENT SETTINGS (Comprehensive) (if applicable):   PRVC mode, FiO2 30 %, PEEP 5 , Respiratory Rate 32 , Tidal Volume 10.4   Vent Information  $Ventilation: $Subsequent Day  Skin Assessment: Clean, dry, & intact  Suction Catheter Diameter: 14  Equipment Changed: HME  Vent Type: Servo i  Vent Mode: PRVC  Vt Ordered: 490 mL  Rate Set: 15 bmp  Pressure Support: (S) 6 cmH20  FiO2 : 30 %  SpO2: 99 %  SpO2/FiO2 ratio: 330  Sensitivity: 4  PEEP/CPAP: 5  I Time/ I Time %: 0.9 s  Humidification Source: HME  Additional Respiratory  Assessments  Pulse: 92  Resp: 15  SpO2: 99 %  End Tidal CO2: 39 (%)  Position: Semi-Omer's  Humidification Source: HME  Oral Care Completed?: Yes  Oral Care: Lip moisturizer applied, Mouth moisturizer, Mouth swabbed, Mouth suctioned  Subglottic Suction Done?: No  Cuff Pressure (cm H2O): (mlt)    ABGs:     Lab Results   Component Value Date    PHART 7.218 08/14/2020    WSZ1UCX 56.3 08/14/2020    PO2ART 82.3 08/14/2020    MYJ4OTS 22.9 08/14/2020    SJF8GJB 28 08/17/2020    U4ZPQVVG 93.1 08/14/2020    FIO2 40.0 08/17/2020     Lactic Acid:   Lab Results   Component Value Date    LACTA NOT REPORTED 08/15/2020    LACTA NOT REPORTED 08/15/2020    LACTA NOT REPORTED 08/15/2020         DATA:  Complete Blood Count:   Recent Labs     08/15/20  0457 08/16/20  0449 08/17/20  0405   WBC 23.8* 15.3* 12.6*   HGB 13.2 11.2* 12.5*   MCV 97.8 94.7 97.7    192 211   RBC 4.11* 3.58* 3.92*   HCT 40.2* 33.9* 38.3*   MCH 32.1 31.3 31.9   MCHC 32.8 33.0 32.6   RDW 13.5 13.3 13.1   MPV 10.3 10.0 10.5        PT/INR:    Lab Results   Component Value Date    PROTIME 14.2 08/13/2020    INR 1.1 08/13/2020     PTT:    Lab Results   Component Value Date    APTT 53.2 08/15/2020       Basal Metabolic Profile:   Recent Labs     08/16/20  1852 08/16/20  2204 08/17/20  0405    136 134*   K 3.7 4.1 4.0   BUN 9 10 11   CREATININE 0.94 0.96 1.03   CL 97* 97* 100   CO2 23 25 22      Magnesium:   Lab Results   Component Value Date    MG 2.4 08/17/2020    MG 2.3 08/16/2020    MG 2.0 08/16/2020     Phosphorus:   Lab Results   Component Value Date    PHOS 2.8 08/17/2020    PHOS 3.9 08/16/2020    PHOS 1.0 08/16/2020     S. Calcium:  Recent Labs     08/17/20  0405   CALCIUM 8.8     S. Ionized Calcium:No results for input(s): IONCA in the last 72 hours. Urinalysis:   Lab Results   Component Value Date    NITRU NEGATIVE 08/14/2020    COLORU YELLOW 08/14/2020    PHUR 6.0 08/14/2020    WBCUA 0 TO 2 08/14/2020    RBCUA 2 TO 5 08/14/2020    MUCUS NOT REPORTED 08/14/2020    TRICHOMONAS NOT REPORTED 08/14/2020    YEAST NOT REPORTED 08/14/2020    BACTERIA NOT REPORTED 08/14/2020    SPECGRAV 1.014 08/14/2020    LEUKOCYTESUR NEGATIVE 08/14/2020    UROBILINOGEN Normal 08/14/2020    BILIRUBINUR NEGATIVE 08/14/2020    GLUCOSEU 2+ 08/14/2020    KETUA NEGATIVE 08/14/2020    AMORPHOUS 1+ 08/14/2020       CARDIAC ENZYMES: No results for input(s): CKMB, CKMBINDEX, TROPONINI in the last 72 hours. Invalid input(s): CKTOTAL;3  BNP: No results for input(s): BNP in the last 72 hours. LFTS  Recent Labs     08/15/20  0457 08/15/20  1807 08/16/20  0449 08/17/20  0405   ALKPHOS 59 63 66 76   ALT 36 27 24 21   AST 26 20 18 19   BILITOT 0.93 1.05 1.60* 1.30*   BILIDIR  --  0.25  --   --    LABALBU 3.0* 2.7* 2.8* 2.9*       AMYLASE/LIPASE/AMMONIA  No results for input(s): AMYLASE, LIPASE, AMMONIA in the last 72 hours.     Last 3 Blood Glucose:   Recent Labs     08/15/20  0457 08/16/20  0449 08/16/20  1852 08/16/20  2204 08/17/20  0405   GLUCOSE 137* 100* 135* 112* 105*      HgBA1c:  No results found for: LABA1C      TSH:  No results found for: TSH  ANEMIA STUDIES  No results for input(s): LABIRON, TIBC, FERRITIN, CHAKPNSF90, FOLATE, OCCULTBLD in the last 72 hours. Cultures during this admission:     Blood cultures:                 [] None drawn      [] Negative             [x]  Positive (Details:  )  Urine Culture:                   [] None drawn      [] Negative             []  Positive (Details:  )  Sputum Culture:               [] None drawn       [] Negative             [x]  Positive (Details:  )   Endotracheal aspirate:     [] None drawn       [] Negative             []  Positive (Details:  )             Chest Xray (8/17/2020):    ASSESSMENT:     Active Problems:    Acute respiratory failure with hypoxia ([address]    Sepsis due to Gram-negative organism with septic shock ([address]  Resolved Problems:    * No resolved hospital problems. *          PLAN:      1) Acute hypoxic respiratory failure secondary to drug overdose/ Pneumonia      -- Status intubated , ventilated and sedated with PRVC  settings of FiO2 30, PEEP of 5,      Respiratory rate of 32 and left ventilation of 10.2 L  -- ABG shows use of 7.4 SPO2 of 91 PCO2 of 41.8, saturations of 97 and bicarb of 26.7  --- the patient is urinating well. We will stop lasix and start IV Ns at 75ml/hr   -- Chest x-ray still shows Diffuse interstitial opacities  -- SAT / SBT daily. -- ABG today    -- Chest Xray today   -- Continue sedation with versed wean off as tolerated  -- continue mech ventilation. 2) Septic shock secondary to Serratia. --Blood cultures were positive to Serratia,   --Continue piperacillin- tazobactam (zosyn) IV   --Fluids IV normal saline 75ml/hr   --Follow repeat blood cultures and    --We will continue Zosyn IV 3.375GM IV will switch according to blood culture report and sensitivities. 3) Suspected Pneumonia from MSSA in Respiratory Cultures    -- continue IV Zosyn for MSSA. -- chest xray today  -- Continue Mechanical Ventilation. -- RT evaluation.     4) Hypo-kalemia hypophosphatemia and hypomagnesemia. --Status resolved with potassium of 4.0 phosphorus of 2.8 and magnesium of 2.4        PT/OT    DVT Prohylaxis : Enoxaparin 30mg. Disposition : Remains in ICU. Amilcar Gaines M.D.              Department of Internal Medicine/ Critical care  [id number])             8/17/2020, 2:21 PM

## 2020-08-17 NOTE — CARE COORDINATION
TRANSITIONAL CARE PLANNING/ 2 [facility]: 3    Reason for Admission: Acute respiratory failure with hypoxia ([facility] [address] [J96.01]     Treatment Plan of Care: CC following     Tests/Procedures still needed: n/a     Barriers to Discharge: I/S. IV antibiotics. Readmission Risk              Risk of Unplanned Readmission:        12            Patient goals/Treatment Preferences/Transitional Plan:   Drug OD. Monitor for d/c needs. Patient still intubated and sedated.    Referrals Made: n/a      Follow Up needed:

## 2020-08-17 NOTE — PLAN OF CARE
Problem: Restraint Use - Nonviolent/Non-Self-Destructive Behavior:  Goal: Absence of restraint-related injury  Description: Absence of restraint-related injury  8/17/2020 1907 by Charles Dugan RN  Outcome: Met This Shift  8/17/2020 0515 by Karen Marvin RN  Outcome: Not Met This Shift     Problem: OXYGENATION/RESPIRATORY FUNCTION  Goal: Patient will maintain patent airway  8/17/2020 1907 by Charles Dugan RN  Outcome: Ongoing  8/17/2020 0747 by Isabel Vickers RCP  Outcome: Ongoing  8/17/2020 0515 by Karen Marvin RN  Outcome: Ongoing  Goal: Patient will achieve/maintain normal respiratory rate/effort  Description: Respiratory rate and effort will be within normal limits for the patient  8/17/2020 1907 by Charles Dugan RN  Outcome: Ongoing  8/17/2020 0747 by Isabel Vickers RCP  Outcome: Ongoing  8/17/2020 0515 by Karen Marvin RN  Outcome: Ongoing     Problem: MECHANICAL VENTILATION  Goal: Patient will maintain patent airway  8/17/2020 1907 by Charles Dugan RN  Outcome: Ongoing  8/17/2020 0747 by Isabel Vickers RCP  Outcome: Ongoing  8/17/2020 0515 by Karen Marvin RN  Outcome: Ongoing  Goal: Oral health is maintained or improved  8/17/2020 1907 by Charles Dugan RN  Outcome: Ongoing  8/17/2020 0747 by Isabel Vickers RCP  Outcome: Ongoing  8/17/2020 0515 by Karen Marvin RN  Outcome: Ongoing  Goal: ET tube will be managed safely  8/17/2020 1907 by Charles Dugan RN  Outcome: Ongoing  8/17/2020 0747 by Isabel Vickers RCP  Outcome: Ongoing  8/17/2020 0515 by Karen Marvin RN  Outcome: Ongoing  Goal: Ability to express needs and understand communication  8/17/2020 1907 by Charles Dugan RN  Outcome: Ongoing  8/17/2020 0747 by Isabel Vickers RCP  Outcome: Ongoing  8/17/2020 0515 by Karen Marvin RN  Outcome: Ongoing  Goal: Mobility/activity is maintained at optimum level for patient  8/17/2020 1907 by Charles Dugan RN  Outcome: Ongoing  8/17/2020 0747 by Isabel Vickers RCP  Outcome: Ongoing  2020 0515 by Sue Steele RN  Outcome: Ongoing     Problem: SKIN INTEGRITY  Goal: Skin integrity is maintained or improved  2020 by Jacki Castaneda RN  Outcome: Ongoing  202047 by Nicko Fischer RCP  Outcome: Ongoing  2020 0515 by Sue Steele RN  Outcome: Ongoing     Problem: NUTRITION  Goal: Nutritional status is improving  2020 by Jacki Castaneda RN  Outcome: Ongoing  2020 0515 by Sue Steele RN  Outcome: Ongoing     Problem: Skin Integrity:  Goal: Will show no infection signs and symptoms  Description: Will show no infection signs and symptoms  2020 by Jacki Castaneda RN  Outcome: Ongoing  2020 by Sue Steele RN  Outcome: Ongoing  Goal: Absence of new skin breakdown  Description: Absence of new skin breakdown  2020 by Jacki Castaneda RN  Outcome: Ongoing  2020 by Sue Steele RN  Outcome: Ongoing     Problem: Falls - Risk of:  Goal: Will remain free from falls  Description: Will remain free from falls  2020 by Jacki Castaneda RN  Outcome: Ongoing  2020 by Sue Steele RN  Outcome: Ongoing  Goal: Absence of physical injury  Description: Absence of physical injury  2020 by Jacki Castaneda RN  Outcome: Ongoing  2020 by Sue Steele RN  Outcome: Ongoing     Problem: Nutrition  Goal: Optimal nutrition therapy  Description: Nutrition Problem #1: Inadequate oral intake  Intervention: Food and/or Nutrient Delivery: Start Tube Feeding  Nutritional Goals: provide greater than 75% of nutrition needs     2020 by Jacki Castaneda RN  Outcome: Ongoing  2020 by Sue Steele RN  Outcome: Ongoing     Problem: Restraint Use - Nonviolent/Non-Self-Destructive Behavior:  Goal: Absence of restraint indications  Description: Absence of restraint indications  2020 by Jacki Castaneda RN  Outcome: Not Met This Shift  8/17/2020 0515 by Gwenlyn Canavan, RN  Outcome: Not Met This Shift

## 2020-08-18 LAB
ABSOLUTE EOS #: 0.46 K/UL (ref 0–0.44)
ABSOLUTE IMMATURE GRANULOCYTE: 0.05 K/UL (ref 0–0.3)
ABSOLUTE LYMPH #: 0.98 K/UL (ref 1.1–3.7)
ABSOLUTE MONO #: 0.94 K/UL (ref 0.1–1.2)
ALBUMIN SERPL-MCNC: 2.9 G/DL (ref 3.5–5.2)
ALBUMIN/GLOBULIN RATIO: 0.9 (ref 1–2.5)
ALLEN TEST: POSITIVE
ALP BLD-CCNC: 87 U/L (ref 40–129)
ALT SERPL-CCNC: 15 U/L (ref 5–41)
ANION GAP SERPL CALCULATED.3IONS-SCNC: 12 MMOL/L (ref 9–17)
AST SERPL-CCNC: 15 U/L
BASOPHILS # BLD: 0 % (ref 0–2)
BASOPHILS ABSOLUTE: 0.03 K/UL (ref 0–0.2)
BILIRUB SERPL-MCNC: 0.71 MG/DL (ref 0.3–1.2)
BUN BLDV-MCNC: 15 MG/DL (ref 6–20)
BUN/CREAT BLD: ABNORMAL (ref 9–20)
CALCIUM IONIZED: 1.12 MMOL/L (ref 1.13–1.33)
CALCIUM SERPL-MCNC: 8.5 MG/DL (ref 8.6–10.4)
CHLORIDE BLD-SCNC: 103 MMOL/L (ref 98–107)
CO2: 24 MMOL/L (ref 20–31)
CREAT SERPL-MCNC: 0.92 MG/DL (ref 0.7–1.2)
DIFFERENTIAL TYPE: ABNORMAL
EOSINOPHILS RELATIVE PERCENT: 6 % (ref 1–4)
FIO2: 30
GFR AFRICAN AMERICAN: >60 ML/MIN
GFR NON-AFRICAN AMERICAN: >60 ML/MIN
GFR SERPL CREATININE-BSD FRML MDRD: ABNORMAL ML/MIN/{1.73_M2}
GFR SERPL CREATININE-BSD FRML MDRD: ABNORMAL ML/MIN/{1.73_M2}
GLUCOSE BLD-MCNC: 142 MG/DL (ref 70–99)
GLUCOSE BLD-MCNC: 154 MG/DL (ref 74–100)
HCT VFR BLD CALC: 32.8 % (ref 40.7–50.3)
HEMOGLOBIN: 10.8 G/DL (ref 13–17)
IMMATURE GRANULOCYTES: 1 %
LYMPHOCYTES # BLD: 12 % (ref 24–43)
MAGNESIUM: 2.3 MG/DL (ref 1.6–2.6)
MCH RBC QN AUTO: 31.5 PG (ref 25.2–33.5)
MCHC RBC AUTO-ENTMCNC: 32.9 G/DL (ref 28.4–34.8)
MCV RBC AUTO: 95.6 FL (ref 82.6–102.9)
MODE: ABNORMAL
MONOCYTES # BLD: 12 % (ref 3–12)
NEGATIVE BASE EXCESS, ART: ABNORMAL (ref 0–2)
NRBC AUTOMATED: 0 PER 100 WBC
O2 DEVICE/FLOW/%: ABNORMAL
PATIENT TEMP: ABNORMAL
PDW BLD-RTO: 12.8 % (ref 11.8–14.4)
PHOSPHORUS: 3.2 MG/DL (ref 2.5–4.5)
PLATELET # BLD: 235 K/UL (ref 138–453)
PLATELET ESTIMATE: ABNORMAL
PMV BLD AUTO: 9.5 FL (ref 8.1–13.5)
POC HCO3: 28.5 MMOL/L (ref 21–28)
POC LACTIC ACID: 0.42 MMOL/L (ref 0.56–1.39)
POC O2 SATURATION: 96 % (ref 94–98)
POC PCO2 TEMP: ABNORMAL MM HG
POC PCO2: 47.1 MM HG (ref 35–48)
POC PH TEMP: ABNORMAL
POC PH: 7.39 (ref 7.35–7.45)
POC PO2 TEMP: ABNORMAL MM HG
POC PO2: 83 MM HG (ref 83–108)
POSITIVE BASE EXCESS, ART: 3 (ref 0–3)
POTASSIUM SERPL-SCNC: 3.6 MMOL/L (ref 3.7–5.3)
RBC # BLD: 3.43 M/UL (ref 4.21–5.77)
RBC # BLD: ABNORMAL 10*6/UL
SAMPLE SITE: ABNORMAL
SEG NEUTROPHILS: 70 % (ref 36–65)
SEGMENTED NEUTROPHILS ABSOLUTE COUNT: 5.73 K/UL (ref 1.5–8.1)
SODIUM BLD-SCNC: 139 MMOL/L (ref 135–144)
TCO2 (CALC), ART: 30 MMOL/L (ref 22–29)
TOTAL CK: 372 U/L (ref 39–308)
TOTAL PROTEIN: 6.2 G/DL (ref 6.4–8.3)
WBC # BLD: 8.2 K/UL (ref 3.5–11.3)
WBC # BLD: ABNORMAL 10*3/UL

## 2020-08-18 PROCEDURE — 6360000002 HC RX W HCPCS: Performed by: STUDENT IN AN ORGANIZED HEALTH CARE EDUCATION/TRAINING PROGRAM

## 2020-08-18 PROCEDURE — 94770 HC ETCO2 MONITOR DAILY: CPT

## 2020-08-18 PROCEDURE — 93005 ELECTROCARDIOGRAM TRACING: CPT | Performed by: STUDENT IN AN ORGANIZED HEALTH CARE EDUCATION/TRAINING PROGRAM

## 2020-08-18 PROCEDURE — 99291 CRITICAL CARE FIRST HOUR: CPT | Performed by: INTERNAL MEDICINE

## 2020-08-18 PROCEDURE — 2500000003 HC RX 250 WO HCPCS: Performed by: INTERNAL MEDICINE

## 2020-08-18 PROCEDURE — 80053 COMPREHEN METABOLIC PANEL: CPT

## 2020-08-18 PROCEDURE — 36600 WITHDRAWAL OF ARTERIAL BLOOD: CPT

## 2020-08-18 PROCEDURE — 94003 VENT MGMT INPAT SUBQ DAY: CPT

## 2020-08-18 PROCEDURE — 6370000000 HC RX 637 (ALT 250 FOR IP): Performed by: STUDENT IN AN ORGANIZED HEALTH CARE EDUCATION/TRAINING PROGRAM

## 2020-08-18 PROCEDURE — 2700000000 HC OXYGEN THERAPY PER DAY

## 2020-08-18 PROCEDURE — 82550 ASSAY OF CK (CPK): CPT

## 2020-08-18 PROCEDURE — 2500000003 HC RX 250 WO HCPCS: Performed by: STUDENT IN AN ORGANIZED HEALTH CARE EDUCATION/TRAINING PROGRAM

## 2020-08-18 PROCEDURE — 83735 ASSAY OF MAGNESIUM: CPT

## 2020-08-18 PROCEDURE — 82803 BLOOD GASES ANY COMBINATION: CPT

## 2020-08-18 PROCEDURE — 83605 ASSAY OF LACTIC ACID: CPT

## 2020-08-18 PROCEDURE — 6370000000 HC RX 637 (ALT 250 FOR IP): Performed by: INTERNAL MEDICINE

## 2020-08-18 PROCEDURE — 2000000000 HC ICU R&B

## 2020-08-18 PROCEDURE — 2580000003 HC RX 258: Performed by: STUDENT IN AN ORGANIZED HEALTH CARE EDUCATION/TRAINING PROGRAM

## 2020-08-18 PROCEDURE — 82330 ASSAY OF CALCIUM: CPT

## 2020-08-18 PROCEDURE — 85025 COMPLETE CBC W/AUTO DIFF WBC: CPT

## 2020-08-18 PROCEDURE — 84100 ASSAY OF PHOSPHORUS: CPT

## 2020-08-18 PROCEDURE — 36415 COLL VENOUS BLD VENIPUNCTURE: CPT

## 2020-08-18 PROCEDURE — 82947 ASSAY GLUCOSE BLOOD QUANT: CPT

## 2020-08-18 PROCEDURE — 94761 N-INVAS EAR/PLS OXIMETRY MLT: CPT

## 2020-08-18 RX ORDER — DEXMEDETOMIDINE HYDROCHLORIDE 4 UG/ML
0.2 INJECTION, SOLUTION INTRAVENOUS CONTINUOUS
Status: DISCONTINUED | OUTPATIENT
Start: 2020-08-18 | End: 2020-08-25

## 2020-08-18 RX ORDER — POTASSIUM CHLORIDE 20MEQ/15ML
40 LIQUID (ML) ORAL ONCE
Status: DISCONTINUED | OUTPATIENT
Start: 2020-08-18 | End: 2020-08-18

## 2020-08-18 RX ORDER — QUETIAPINE FUMARATE 25 MG/1
25 TABLET, FILM COATED ORAL 2 TIMES DAILY
Status: DISCONTINUED | OUTPATIENT
Start: 2020-08-18 | End: 2020-08-19

## 2020-08-18 RX ORDER — CALCIUM GLUCONATE 20 MG/ML
1 INJECTION, SOLUTION INTRAVENOUS ONCE
Status: COMPLETED | OUTPATIENT
Start: 2020-08-18 | End: 2020-08-18

## 2020-08-18 RX ADMIN — QUETIAPINE FUMARATE 25 MG: 25 TABLET ORAL at 14:10

## 2020-08-18 RX ADMIN — SODIUM CHLORIDE, PRESERVATIVE FREE 10 ML: 5 INJECTION INTRAVENOUS at 08:49

## 2020-08-18 RX ADMIN — DEXMEDETOMIDINE HYDROCHLORIDE 0.8 MCG/KG/HR: 400 INJECTION INTRAVENOUS at 20:43

## 2020-08-18 RX ADMIN — ENOXAPARIN SODIUM 30 MG: 30 INJECTION SUBCUTANEOUS at 08:49

## 2020-08-18 RX ADMIN — Medication 175 MCG/HR: at 23:52

## 2020-08-18 RX ADMIN — Medication 8 MG/HR: at 20:42

## 2020-08-18 RX ADMIN — CALCIUM GLUCONATE 1 G: 20 INJECTION, SOLUTION INTRAVENOUS at 08:36

## 2020-08-18 RX ADMIN — Medication 10 MG/HR: at 08:36

## 2020-08-18 RX ADMIN — PIPERACILLIN AND TAZOBACTAM 3.38 G: 3; .375 INJECTION, POWDER, FOR SOLUTION INTRAVENOUS at 23:54

## 2020-08-18 RX ADMIN — SODIUM CHLORIDE, PRESERVATIVE FREE 10 ML: 5 INJECTION INTRAVENOUS at 20:19

## 2020-08-18 RX ADMIN — Medication 175 MCG/HR: at 18:47

## 2020-08-18 RX ADMIN — PIPERACILLIN AND TAZOBACTAM 3.38 G: 3; .375 INJECTION, POWDER, FOR SOLUTION INTRAVENOUS at 06:31

## 2020-08-18 RX ADMIN — CHLORHEXIDINE GLUCONATE 15 ML: 1.2 RINSE ORAL at 08:50

## 2020-08-18 RX ADMIN — Medication 100 MG: at 08:49

## 2020-08-18 RX ADMIN — Medication 100 MG: at 20:19

## 2020-08-18 RX ADMIN — PIPERACILLIN AND TAZOBACTAM 3.38 G: 3; .375 INJECTION, POWDER, FOR SOLUTION INTRAVENOUS at 16:35

## 2020-08-18 RX ADMIN — FAMOTIDINE 20 MG: 10 INJECTION, SOLUTION INTRAVENOUS at 08:49

## 2020-08-18 RX ADMIN — QUETIAPINE FUMARATE 25 MG: 25 TABLET ORAL at 20:19

## 2020-08-18 RX ADMIN — CHLORHEXIDINE GLUCONATE 15 ML: 1.2 RINSE ORAL at 20:24

## 2020-08-18 RX ADMIN — Medication 175 MCG/HR: at 13:50

## 2020-08-18 RX ADMIN — Medication 150 MCG/HR: at 02:01

## 2020-08-18 RX ADMIN — POTASSIUM BICARBONATE 40 MEQ: 782 TABLET, EFFERVESCENT ORAL at 08:36

## 2020-08-18 RX ADMIN — FAMOTIDINE 20 MG: 10 INJECTION, SOLUTION INTRAVENOUS at 20:19

## 2020-08-18 RX ADMIN — Medication 150 MCG/HR: at 08:25

## 2020-08-18 ASSESSMENT — PULMONARY FUNCTION TESTS
PIF_VALUE: 14
PIF_VALUE: 19
PIF_VALUE: 14
PIF_VALUE: 17
PIF_VALUE: 19
PIF_VALUE: 17
PIF_VALUE: 20
PIF_VALUE: 15
PIF_VALUE: 18
PIF_VALUE: 18
PIF_VALUE: 16
PIF_VALUE: 16
PIF_VALUE: 18
PIF_VALUE: 14
PIF_VALUE: 19
PIF_VALUE: 12
PIF_VALUE: 18
PIF_VALUE: 18
PIF_VALUE: 10

## 2020-08-18 NOTE — PLAN OF CARE
Problem: Restraint Use - Nonviolent/Non-Self-Destructive Behavior:  Goal: Absence of restraint-related injury  Description: Absence of restraint-related injury  8/17/2020 2211 by Clark Araya RN  Outcome: Met This Shift  8/17/2020 1907 by Rena De La Torre RN  Outcome: Met This Shift     Problem: OXYGENATION/RESPIRATORY FUNCTION  Goal: Patient will maintain patent airway  8/17/2020 2211 by Clark Araya RN  Outcome: Ongoing  8/17/2020 2101 by Jose G Iqbal RCP  Outcome: Ongoing  8/17/2020 1907 by Rena De La Torre RN  Outcome: Ongoing  Goal: Patient will achieve/maintain normal respiratory rate/effort  Description: Respiratory rate and effort will be within normal limits for the patient  8/17/2020 2211 by Clark Araya RN  Outcome: Ongoing  8/17/2020 2101 by Jose G Iqbal RCP  Outcome: Ongoing  8/17/2020 1907 by Rena De La Torre RN  Outcome: Ongoing     Problem: MECHANICAL VENTILATION  Goal: Patient will maintain patent airway  8/17/2020 2211 by Clark Araya RN  Outcome: Ongoing  8/17/2020 2101 by Jose G Iqbal RCP  Outcome: Ongoing  8/17/2020 1907 by Rena De La Torre RN  Outcome: Ongoing  Goal: Oral health is maintained or improved  8/17/2020 2211 by Clark Araya RN  Outcome: Ongoing  8/17/2020 2101 by Jose G Iqbal RCP  Outcome: Ongoing  8/17/2020 1907 by Rena De La Torre RN  Outcome: Ongoing  Goal: ET tube will be managed safely  8/17/2020 2211 by Clark Araya RN  Outcome: Ongoing  8/17/2020 2101 by Jose G Iqbal RCP  Outcome: Ongoing  8/17/2020 1907 by Rena De La Torre RN  Outcome: Ongoing  Goal: Ability to express needs and understand communication  8/17/2020 2211 by Clark Araya RN  Outcome: Ongoing  8/17/2020 2101 by Jose G Iqbal RCP  Outcome: Ongoing  8/17/2020 1907 by Rena De La Torre RN  Outcome: Ongoing  Goal: Mobility/activity is maintained at optimum level for patient  8/17/2020 2211 by Clark Araya RN  Outcome: Ongoing  8/17/2020 2101 by RAMY Hernandez Ra Hines RCP  Outcome: Ongoing  8/17/2020 1907 by Saeid Lange RN  Outcome: Ongoing     Problem: SKIN INTEGRITY  Goal: Skin integrity is maintained or improved  8/17/2020 2211 by Olvin Lieberman RN  Outcome: Ongoing  8/17/2020 2101 by Reginald Tsai RCP  Outcome: Ongoing  8/17/2020 1907 by Saeid Lange RN  Outcome: Ongoing     Problem: NUTRITION  Goal: Nutritional status is improving  8/17/2020 2211 by Olvin Lieberman RN  Outcome: Ongoing  8/17/2020 1907 by Saeid Lange RN  Outcome: Ongoing     Problem: Skin Integrity:  Goal: Will show no infection signs and symptoms  Description: Will show no infection signs and symptoms  8/17/2020 2211 by Olvin Lieberman RN  Outcome: Ongoing  8/17/2020 1907 by Saeid Lange RN  Outcome: Ongoing  Goal: Absence of new skin breakdown  Description: Absence of new skin breakdown  8/17/2020 2211 by Olvin Lieberman RN  Outcome: Ongoing  8/17/2020 1907 by Saeid Lange RN  Outcome: Ongoing     Problem: Falls - Risk of:  Goal: Will remain free from falls  Description: Will remain free from falls  8/17/2020 2211 by Olvin Lieberman RN  Outcome: Ongoing  8/17/2020 1907 by Saeid Lange RN  Outcome: Ongoing  Goal: Absence of physical injury  Description: Absence of physical injury  8/17/2020 2211 by Olvin Lieberman RN  Outcome: Ongoing  8/17/2020 1907 by Saeid Lange RN  Outcome: Ongoing     Problem: Nutrition  Goal: Optimal nutrition therapy  Description: Nutrition Problem #1: Inadequate oral intake  Intervention: Food and/or Nutrient Delivery: Start Tube Feeding  Nutritional Goals: provide greater than 75% of nutrition needs     8/17/2020 2211 by Olvin Lieberman RN  Outcome: Ongoing  8/17/2020 1907 by Saeid Lange RN  Outcome: Ongoing

## 2020-08-18 NOTE — PROGRESS NOTES
Critical Care Team - Daily Progress Note      Date and time: 8/18/2020 3:28 PM  Patient's name:  Mook Arriaga  Medical Record Number: [id number]  Patient's account/billing number: [de-identified]  Patient's YOB: 1985  Age: 38 y.o. Date of Admission: 8/14/2020  5:18 AM  Length of stay during current admission: 4      Primary Care Physician: No primary care provider on file. ICU Attending Physician: Dr. Mark Jordan    Code Status: Full Code    Reason for ICU admission: Acute onset shortness of breath with respiratory distress due to recreational drug abuse      SUBJECTIVE:     OVERNIGHT EVENTS:       None    Current Evaluation :     Patient is intubated, sedated with Versed 8 mg/hr   and Presudex 0.2 mcg/kg/hr. Following commands. Review of systems: Unable to attain as the patient is intubated. Ventilation status : Intubated  Ventilatory setting :    Mode : PRVC:   PEEP 5, Fio2 30, RR 15, Min Vent 7.5 L, Spo2 96%  ABG at 11:00 pm yesterday: PH 7.3, HCO3 , Pco2 47, Po2 83,  Spo2 96  Secretions : Moderate  Sedation: Versed 8 mg/h and Precedex 0.2 mcg/kg/h      Vitals stable, pressors  Central Lines : Left internal jugular, intact with no leakage. Sibley placed with Urine output 730    Diet : Tube feeding  Tube Feeds : OG/NS with rate of 30 mL/kg with no residual    Fluids : Total Intake of 2682, Total output of 730 with a net positive of 1952 in  last 24 hrs. Pt receiving IV normal saline 50 mL/hr  Total net of 1952 since admission.     Significant Labs : POC glucose of 154 ABG bicarb of 28.5,  lactate 0.42 calcium of 8.5 ionized calcium of 1.12.  Blood culture no growth since the last 4 days     Active Meds:enoxaparin 30 mg famotidine 20 mg, Zosyn 3.375 g potassium bicarbonate, Seroquel 25 mg, Precedex 400 mcg, fentanyl 20 mcg, Versed 1 mg    Symptomatic Treatment: None    AWAKE & FOLLOWING COMMANDS:  [] No   [x] Yes    CURRENT VENTILATION STATUS:     [x] Ventilator  [] BIPAP  [] Nasal Cannula [] Room Air      IF INTUBATED, ET TUBE MARKING AT LOWER LIP:       cms    SECRETIONS Amount:  [x] Small [] Moderate  [] Large  [] None  Color:     [] White [] Colored  [] Bloody    SEDATION:  RAAS Score:  [] Propofol gtt  [] Versed gtt  [] Ativan gtt   [] No Sedation    PARALYZED:  [x] No    [] Yes    DIARRHEA:                [x] No                [] Yes  (C. Difficile status: [] positive                                                                                                                       [] negative                                                                                                                     [] pending)    VASOPRESSORS:  [x] No    [] Yes    If yes -   [] Levophed       [] Dopamine     [] Vasopressin       [] Dobutamine  [] Phenylephrine         [] Epinephrine    CENTRAL LINES:     [] No   [x] Yes   (Date of Insertion:   )           If yes -     [] Right IJ     [x] Left IJ [] Right Femoral [] Left Femoral                   [] Right Subclavian [] Left Subclavian       SALAMANCA'S CATHETER:   [] No   [x] Yes  (Date of Insertion:   )     URINE OUTPUT:            [x] Good   [] Low              [] Anuric      OBJECTIVE:     VITAL SIGNS:  /71   Pulse 70   Temp 99 °F (37.2 °C) (Oral)   Resp 16   Ht 5' 4\" (1.626 m)   Wt 137 lb 2 oz (62.2 kg)   SpO2 96%   BMI 23.54 kg/m²   Tmax over 24 hours:  Temp (24hrs), Av.4 °F (36.9 °C), Min:97.3 °F (36.3 °C), Max:99.7 °F (37.6 °C)      Patient Vitals for the past 8 hrs:   BP Temp Temp src Pulse Resp SpO2   20 1500 103/71 -- -- 70 16 96 %   20 1400 110/73 -- -- 74 16 96 %   20 1300 105/70 -- -- 72 15 97 %   20 1207 -- -- -- 76 17 98 %   20 1200 110/74 -- -- 75 16 98 %   20 1100 101/68 99 °F (37.2 °C) Oral 72 15 99 %   20 1000 (!) 102/59 -- -- 74 16 96 %   20 0912 -- -- -- 93 26 94 %   20 0900 121/85 -- -- 78 19 100 %   20 0819 99/61 98.5 °F (36.9 °C) Oral 73 14 98 %   20 0800 107/66 -- -- 80 16 98 %         Intake/Output Summary (Last 24 hours) at 8/18/2020 1528  Last data filed at 8/18/2020 1100  Gross per 24 hour   Intake 3278.8 ml   Output 590 ml   Net 2688.8 ml     Date 08/18/20 0000 - 08/18/20 2359   Shift 3557-1056 8947-3195 8363-2191 24 Hour Total   INTAKE   I.V.(mL/kg) 679(10.9) 802. 2(12.9)  1481. 2(23.8)   NG/GT(mL/kg) 323(5.2) 379(6.1)  702(11.3)   Shift Total(mL/kg) 8648(89.8) 1130.7(23)  2183. 2(35.1)   OUTPUT   Urine(mL/kg/hr) 105(0.2) 200  305   Shift Total(mL/kg) 105(1.7) 200(3.2)  305(4.9)   Weight (kg) 62.2 62.2 62.2 62.2     Wt Readings from Last 3 Encounters:   08/18/20 137 lb 2 oz (62.2 kg)   08/13/20 150 lb (68 kg)   08/10/19 126 lb 15.8 oz (57.6 kg)     Body mass index is 23.54 kg/m². PHYSICAL EXAM:    Constitutional: Status sedated intubated and ventilated, consider commands. EENT: sclera clear, anicteric, neck supple with midline trachea. Neck: Supple, symmetrical, trachea midline with left internal jugular vein catheter  Respiratory: clear to auscultation, no wheezes or rales and unlabored breathing No intercostal tenderness  Cardiovascular: regular rate and rhythm, normal S1, S2, no murmur noted and 2+ pulses  Abdomen: soft, nontender, nondistended, no masses or organomegaly  Neurologic: Follows commands despite being on sedation .   Sensory and motor examination are grossly intact  Extremities: Dorsalis pedis pulsations are felt in radius radial pulsations are 2+         MEDICATIONS:  Scheduled Meds:   QUEtiapine  25 mg Oral BID    chlorhexidine  15 mL Mouth/Throat BID    piperacillin-tazobactam  3.375 g Intravenous Q8H    enoxaparin  30 mg Subcutaneous Daily    famotidine (PEPCID) injection  20 mg Intravenous BID    sodium chloride flush  10 mL Intravenous 2 times per day    sodium chloride flush  10 mL Intravenous 2 times per day    docusate  100 mg Per NG tube BID     Continuous Infusions:   dexmedetomidine 0.6 mcg/kg/hr (08/18/20 1410)    11.2* 12.5* 10.8*   MCV 94.7 97.7 95.6    211 235   RBC 3.58* 3.92* 3.43*   HCT 33.9* 38.3* 32.8*   MCH 31.3 31.9 31.5   MCHC 33.0 32.6 32.9   RDW 13.3 13.1 12.8   MPV 10.0 10.5 9.5        PT/INR:    Lab Results   Component Value Date    PROTIME 14.2 08/13/2020    INR 1.1 08/13/2020     PTT:    Lab Results   Component Value Date    APTT 53.2 08/15/2020       Basal Metabolic Profile:   Recent Labs     08/16/20  2204 08/17/20  0405 08/18/20  0429    134* 139   K 4.1 4.0 3.6*   BUN 10 11 15   CREATININE 0.96 1.03 0.92   CL 97* 100 103   CO2 25 22 24      Magnesium:   Lab Results   Component Value Date    MG 2.3 08/18/2020    MG 2.4 08/17/2020    MG 2.3 08/16/2020     Phosphorus:   Lab Results   Component Value Date    PHOS 3.2 08/18/2020    PHOS 2.8 08/17/2020    PHOS 3.9 08/16/2020     S. Calcium:  Recent Labs     08/18/20  0429   CALCIUM 8.5*     S. Ionized Calcium:No results for input(s): IONCA in the last 72 hours. Urinalysis:   Lab Results   Component Value Date    NITRU NEGATIVE 08/14/2020    COLORU YELLOW 08/14/2020    PHUR 6.0 08/14/2020    WBCUA 0 TO 2 08/14/2020    RBCUA 2 TO 5 08/14/2020    MUCUS NOT REPORTED 08/14/2020    TRICHOMONAS NOT REPORTED 08/14/2020    YEAST NOT REPORTED 08/14/2020    BACTERIA NOT REPORTED 08/14/2020    SPECGRAV 1.014 08/14/2020    LEUKOCYTESUR NEGATIVE 08/14/2020    UROBILINOGEN Normal 08/14/2020    BILIRUBINUR NEGATIVE 08/14/2020    GLUCOSEU 2+ 08/14/2020    KETUA NEGATIVE 08/14/2020    AMORPHOUS 1+ 08/14/2020       CARDIAC ENZYMES: No results for input(s): CKMB, CKMBINDEX, TROPONINI in the last 72 hours. Invalid input(s): CKTOTAL;3  BNP: No results for input(s): BNP in the last 72 hours.     LFTS  Recent Labs     08/15/20  1807 08/16/20  0449 08/17/20  0405 08/18/20  0429   ALKPHOS 63 66 76 87   ALT 27 24 21 15   AST 20 18 19 15   BILITOT 1.05 1.60* 1.30* 0.71   BILIDIR 0.25  --   --   --    LABALBU 2.7* 2.8* 2.9* 2.9*       AMYLASE/LIPASE/AMMONIA  No results for input(s): AMYLASE, LIPASE, AMMONIA in the last 72 hours. Last 3 Blood Glucose:   Recent Labs     08/16/20  0449 08/16/20  1852 08/16/20  2204 08/17/20  0405 08/18/20  0429   GLUCOSE 100* 135* 112* 105* 142*      HgBA1c:  No results found for: LABA1C    TSH:  No results found for: TSH  ANEMIA STUDIES  No results for input(s): LABIRON, TIBC, FERRITIN, [id number], FOLATE, OCCULTBLD in the last 72 hours. Cultures during this admission:     Blood cultures:                 [] None drawn      [] Negative             [x]  Positive (Details:  )  Urine Culture:                   [] None drawn      [] Negative             []  Positive (Details:  )  Sputum Culture:               [] None drawn       [] Negative             [x]  Positive (Details:  )   Endotracheal aspirate:     [] None drawn       [] Negative             []  Positive (Details:  )       ASSESSMENT:     Active problems:    Acute respiratory failure secondary to drug intake  Sepsis secondary to Serratia    PLAN:     1. acute hypoxic respiratory failure secondary to drug overdose/pneumonia. --We will continue on intubation and mechanical ventilation. --Continue the sedation with Precedex and wean off Versed elevated. --Monitor secretions  --RT evaluation  --SAT and  SBT  --We will extubate if the patient     2. Sepsis Shock resolved secondary to Serratia. --We will continue Zosyn IV.  --IV normal saline 50 mL/h  --Strict input and  output  --Continue contact precautions for MRSA. PT/OT :    DVT Prophylaxis: Enoxaparin Lovenox 30 mg  Stress Ulcer prophylaxis : Famotidine Pepcid 20 mg    Disposition: Patient stays in Peg Sauer M.D.              Department of Internal Medicine/ Critical care  [facility])             8/18/2020, 3:28 PM

## 2020-08-18 NOTE — PLAN OF CARE
Problem: Restraint Use - Nonviolent/Non-Self-Destructive Behavior:  Goal: Absence of restraint-related injury  Description: Absence of restraint-related injury  Outcome: Met This Shift     Problem: OXYGENATION/RESPIRATORY FUNCTION  Goal: Patient will maintain patent airway  Outcome: Ongoing  Goal: Patient will achieve/maintain normal respiratory rate/effort  Description: Respiratory rate and effort will be within normal limits for the patient  Outcome: Ongoing     Problem: MECHANICAL VENTILATION  Goal: Patient will maintain patent airway  Outcome: Ongoing  Goal: Oral health is maintained or improved  Outcome: Ongoing  Goal: ET tube will be managed safely  Outcome: Ongoing  Goal: Ability to express needs and understand communication  Outcome: Ongoing  Goal: Mobility/activity is maintained at optimum level for patient  Outcome: Ongoing     Problem: SKIN INTEGRITY  Goal: Skin integrity is maintained or improved  Outcome: Ongoing     Problem: NUTRITION  Goal: Nutritional status is improving  Outcome: Ongoing     Problem: Skin Integrity:  Goal: Will show no infection signs and symptoms  Description: Will show no infection signs and symptoms  Outcome: Ongoing  Goal: Absence of new skin breakdown  Description: Absence of new skin breakdown  Outcome: Ongoing     Problem: Falls - Risk of:  Goal: Will remain free from falls  Description: Will remain free from falls  Outcome: Ongoing  Goal: Absence of physical injury  Description: Absence of physical injury  Outcome: Ongoing     Problem: Nutrition  Goal: Optimal nutrition therapy  Description: Nutrition Problem #1: Inadequate oral intake  Intervention: Food and/or Nutrient Delivery: Start Tube Feeding  Nutritional Goals: provide greater than 75% of nutrition needs     Outcome: Ongoing     Problem: Restraint Use - Nonviolent/Non-Self-Destructive Behavior:  Goal: Absence of restraint indications  Description: Absence of restraint indications  Outcome: Not Met This Shift

## 2020-08-18 NOTE — PROGRESS NOTES
Pharmacy Note     Renal Dose Adjustment    Magalie Alonzo is a 38 y.o. male. Pharmacist assessment of renally cleared medications. Recent Labs     08/17/20  0405 08/18/20  0429   BUN 11 15       Recent Labs     08/17/20  0405 08/18/20  0429   CREATININE 1.03 0.92       Estimated Creatinine Clearance: 92 mL/min (based on SCr of 0.92 mg/dL).     Height:   Ht Readings from Last 1 Encounters:   08/14/20 5' 4\" (1.626 m)     Weight:  Wt Readings from Last 1 Encounters:   08/18/20 137 lb 2 oz (62.2 kg)       The following medication dose has been adjusted based upon renal function per P&T Guidelines:             Changed enoxaparin to 40mg daily    Saad ShelleyD

## 2020-08-19 LAB
ABSOLUTE EOS #: 0.54 K/UL (ref 0–0.44)
ABSOLUTE IMMATURE GRANULOCYTE: 0.1 K/UL (ref 0–0.3)
ABSOLUTE LYMPH #: 1.63 K/UL (ref 1.1–3.7)
ABSOLUTE MONO #: 0.95 K/UL (ref 0.1–1.2)
ALBUMIN SERPL-MCNC: 2.9 G/DL (ref 3.5–5.2)
ALBUMIN/GLOBULIN RATIO: 0.8 (ref 1–2.5)
ALLEN TEST: ABNORMAL
ALP BLD-CCNC: 144 U/L (ref 40–129)
ALT SERPL-CCNC: 25 U/L (ref 5–41)
ANION GAP SERPL CALCULATED.3IONS-SCNC: 13 MMOL/L (ref 9–17)
AST SERPL-CCNC: 30 U/L
BASOPHILS # BLD: 0 % (ref 0–2)
BASOPHILS ABSOLUTE: <0.03 K/UL (ref 0–0.2)
BILIRUB SERPL-MCNC: 0.66 MG/DL (ref 0.3–1.2)
BUN BLDV-MCNC: 13 MG/DL (ref 6–20)
BUN/CREAT BLD: ABNORMAL (ref 9–20)
CALCIUM IONIZED: 1.13 MMOL/L (ref 1.13–1.33)
CALCIUM SERPL-MCNC: 8.7 MG/DL (ref 8.6–10.4)
CHLORIDE BLD-SCNC: 102 MMOL/L (ref 98–107)
CO2: 25 MMOL/L (ref 20–31)
CREAT SERPL-MCNC: 0.76 MG/DL (ref 0.7–1.2)
DIFFERENTIAL TYPE: ABNORMAL
EKG ATRIAL RATE: 80 BPM
EKG P AXIS: 59 DEGREES
EKG P-R INTERVAL: 136 MS
EKG Q-T INTERVAL: 402 MS
EKG QRS DURATION: 110 MS
EKG QTC CALCULATION (BAZETT): 463 MS
EKG R AXIS: -27 DEGREES
EKG T AXIS: -48 DEGREES
EKG VENTRICULAR RATE: 80 BPM
EOSINOPHILS RELATIVE PERCENT: 8 % (ref 1–4)
FIO2: 30
GFR AFRICAN AMERICAN: >60 ML/MIN
GFR NON-AFRICAN AMERICAN: >60 ML/MIN
GFR SERPL CREATININE-BSD FRML MDRD: ABNORMAL ML/MIN/{1.73_M2}
GFR SERPL CREATININE-BSD FRML MDRD: ABNORMAL ML/MIN/{1.73_M2}
GLUCOSE BLD-MCNC: 134 MG/DL (ref 70–99)
GLUCOSE BLD-MCNC: 155 MG/DL (ref 74–100)
HCT VFR BLD CALC: 35.6 % (ref 40.7–50.3)
HEMOGLOBIN: 11.8 G/DL (ref 13–17)
IMMATURE GRANULOCYTES: 1 %
LYMPHOCYTES # BLD: 23 % (ref 24–43)
MAGNESIUM: 2.3 MG/DL (ref 1.6–2.6)
MCH RBC QN AUTO: 31.5 PG (ref 25.2–33.5)
MCHC RBC AUTO-ENTMCNC: 33.1 G/DL (ref 28.4–34.8)
MCV RBC AUTO: 94.9 FL (ref 82.6–102.9)
MODE: ABNORMAL
MONOCYTES # BLD: 13 % (ref 3–12)
NEGATIVE BASE EXCESS, ART: ABNORMAL (ref 0–2)
NRBC AUTOMATED: 0 PER 100 WBC
O2 DEVICE/FLOW/%: ABNORMAL
PATIENT TEMP: ABNORMAL
PDW BLD-RTO: 12.5 % (ref 11.8–14.4)
PHOSPHORUS: 3.6 MG/DL (ref 2.5–4.5)
PLATELET # BLD: 261 K/UL (ref 138–453)
PLATELET ESTIMATE: ABNORMAL
PMV BLD AUTO: 9.7 FL (ref 8.1–13.5)
POC HCO3: 27.9 MMOL/L (ref 21–28)
POC O2 SATURATION: 96 % (ref 94–98)
POC PCO2 TEMP: ABNORMAL MM HG
POC PCO2: 48.7 MM HG (ref 35–48)
POC PH TEMP: ABNORMAL
POC PH: 7.37 (ref 7.35–7.45)
POC PO2 TEMP: ABNORMAL MM HG
POC PO2: 85.6 MM HG (ref 83–108)
POSITIVE BASE EXCESS, ART: 2 (ref 0–3)
POTASSIUM SERPL-SCNC: 3.7 MMOL/L (ref 3.7–5.3)
RBC # BLD: 3.75 M/UL (ref 4.21–5.77)
RBC # BLD: ABNORMAL 10*6/UL
SAMPLE SITE: ABNORMAL
SEG NEUTROPHILS: 55 % (ref 36–65)
SEGMENTED NEUTROPHILS ABSOLUTE COUNT: 3.89 K/UL (ref 1.5–8.1)
SODIUM BLD-SCNC: 140 MMOL/L (ref 135–144)
TCO2 (CALC), ART: 29 MMOL/L (ref 22–29)
TOTAL CK: 269 U/L (ref 39–308)
TOTAL PROTEIN: 6.4 G/DL (ref 6.4–8.3)
TRIGL SERPL-MCNC: 193 MG/DL
WBC # BLD: 7.1 K/UL (ref 3.5–11.3)
WBC # BLD: ABNORMAL 10*3/UL

## 2020-08-19 PROCEDURE — 85025 COMPLETE CBC W/AUTO DIFF WBC: CPT

## 2020-08-19 PROCEDURE — 2580000003 HC RX 258: Performed by: STUDENT IN AN ORGANIZED HEALTH CARE EDUCATION/TRAINING PROGRAM

## 2020-08-19 PROCEDURE — 84478 ASSAY OF TRIGLYCERIDES: CPT

## 2020-08-19 PROCEDURE — 80053 COMPREHEN METABOLIC PANEL: CPT

## 2020-08-19 PROCEDURE — 6360000002 HC RX W HCPCS: Performed by: STUDENT IN AN ORGANIZED HEALTH CARE EDUCATION/TRAINING PROGRAM

## 2020-08-19 PROCEDURE — 94761 N-INVAS EAR/PLS OXIMETRY MLT: CPT

## 2020-08-19 PROCEDURE — 82947 ASSAY GLUCOSE BLOOD QUANT: CPT

## 2020-08-19 PROCEDURE — 6370000000 HC RX 637 (ALT 250 FOR IP): Performed by: STUDENT IN AN ORGANIZED HEALTH CARE EDUCATION/TRAINING PROGRAM

## 2020-08-19 PROCEDURE — 94770 HC ETCO2 MONITOR DAILY: CPT

## 2020-08-19 PROCEDURE — 84100 ASSAY OF PHOSPHORUS: CPT

## 2020-08-19 PROCEDURE — 2500000003 HC RX 250 WO HCPCS: Performed by: STUDENT IN AN ORGANIZED HEALTH CARE EDUCATION/TRAINING PROGRAM

## 2020-08-19 PROCEDURE — 2700000000 HC OXYGEN THERAPY PER DAY

## 2020-08-19 PROCEDURE — 83735 ASSAY OF MAGNESIUM: CPT

## 2020-08-19 PROCEDURE — 94003 VENT MGMT INPAT SUBQ DAY: CPT

## 2020-08-19 PROCEDURE — 36415 COLL VENOUS BLD VENIPUNCTURE: CPT

## 2020-08-19 PROCEDURE — 36600 WITHDRAWAL OF ARTERIAL BLOOD: CPT

## 2020-08-19 PROCEDURE — 2500000003 HC RX 250 WO HCPCS: Performed by: INTERNAL MEDICINE

## 2020-08-19 PROCEDURE — 6370000000 HC RX 637 (ALT 250 FOR IP): Performed by: INTERNAL MEDICINE

## 2020-08-19 PROCEDURE — 2000000000 HC ICU R&B

## 2020-08-19 PROCEDURE — 82550 ASSAY OF CK (CPK): CPT

## 2020-08-19 PROCEDURE — 82803 BLOOD GASES ANY COMBINATION: CPT

## 2020-08-19 PROCEDURE — 82330 ASSAY OF CALCIUM: CPT

## 2020-08-19 PROCEDURE — 99291 CRITICAL CARE FIRST HOUR: CPT | Performed by: INTERNAL MEDICINE

## 2020-08-19 RX ORDER — QUETIAPINE FUMARATE 25 MG/1
50 TABLET, FILM COATED ORAL 2 TIMES DAILY
Status: DISCONTINUED | OUTPATIENT
Start: 2020-08-19 | End: 2020-08-20

## 2020-08-19 RX ORDER — QUETIAPINE FUMARATE 25 MG/1
25 TABLET, FILM COATED ORAL ONCE
Status: COMPLETED | OUTPATIENT
Start: 2020-08-19 | End: 2020-08-19

## 2020-08-19 RX ADMIN — PIPERACILLIN AND TAZOBACTAM 3.38 G: 3; .375 INJECTION, POWDER, FOR SOLUTION INTRAVENOUS at 06:31

## 2020-08-19 RX ADMIN — Medication 200 MCG/HR: at 19:37

## 2020-08-19 RX ADMIN — FAMOTIDINE 20 MG: 10 INJECTION, SOLUTION INTRAVENOUS at 20:00

## 2020-08-19 RX ADMIN — PIPERACILLIN AND TAZOBACTAM 3.38 G: 3; .375 INJECTION, POWDER, FOR SOLUTION INTRAVENOUS at 23:00

## 2020-08-19 RX ADMIN — DEXMEDETOMIDINE HYDROCHLORIDE 0.8 MCG/KG/HR: 400 INJECTION INTRAVENOUS at 04:25

## 2020-08-19 RX ADMIN — FAMOTIDINE 20 MG: 10 INJECTION, SOLUTION INTRAVENOUS at 08:59

## 2020-08-19 RX ADMIN — QUETIAPINE FUMARATE 50 MG: 25 TABLET ORAL at 20:00

## 2020-08-19 RX ADMIN — QUETIAPINE FUMARATE 25 MG: 25 TABLET ORAL at 14:13

## 2020-08-19 RX ADMIN — Medication 10 MG/HR: at 17:40

## 2020-08-19 RX ADMIN — Medication 175 MCG/HR: at 05:14

## 2020-08-19 RX ADMIN — DEXMEDETOMIDINE HYDROCHLORIDE 1 MCG/KG/HR: 400 INJECTION INTRAVENOUS at 12:11

## 2020-08-19 RX ADMIN — Medication 200 MCG/HR: at 10:00

## 2020-08-19 RX ADMIN — CHLORHEXIDINE GLUCONATE 15 ML: 1.2 RINSE ORAL at 09:00

## 2020-08-19 RX ADMIN — Medication 100 MG: at 20:00

## 2020-08-19 RX ADMIN — Medication 100 MG: at 09:00

## 2020-08-19 RX ADMIN — SODIUM CHLORIDE, PRESERVATIVE FREE 10 ML: 5 INJECTION INTRAVENOUS at 09:00

## 2020-08-19 RX ADMIN — QUETIAPINE FUMARATE 25 MG: 25 TABLET ORAL at 09:00

## 2020-08-19 RX ADMIN — Medication 200 MCG/HR: at 14:56

## 2020-08-19 RX ADMIN — Medication 9 MG/HR: at 08:33

## 2020-08-19 RX ADMIN — DEXMEDETOMIDINE HYDROCHLORIDE 1.1 MCG/KG/HR: 400 INJECTION INTRAVENOUS at 17:40

## 2020-08-19 RX ADMIN — PIPERACILLIN AND TAZOBACTAM 3.38 G: 3; .375 INJECTION, POWDER, FOR SOLUTION INTRAVENOUS at 16:04

## 2020-08-19 RX ADMIN — SODIUM CHLORIDE, PRESERVATIVE FREE 10 ML: 5 INJECTION INTRAVENOUS at 20:00

## 2020-08-19 RX ADMIN — ENOXAPARIN SODIUM 40 MG: 40 INJECTION SUBCUTANEOUS at 08:59

## 2020-08-19 ASSESSMENT — PULMONARY FUNCTION TESTS
PIF_VALUE: 16
PIF_VALUE: 21
PIF_VALUE: 13
PIF_VALUE: 15
PIF_VALUE: 15
PIF_VALUE: 16
PIF_VALUE: 15
PIF_VALUE: 16
PIF_VALUE: 14
PIF_VALUE: 13
PIF_VALUE: 16
PIF_VALUE: 15

## 2020-08-19 NOTE — PROGRESS NOTES
Critical Care Team - Daily Progress Note      Date and time: 8/19/2020 8:15 AM  Patient's name:  Varghese Rosales Record Number: [id number]  Patient's account/billing number: [de-identified]  Patient's YOB: 1985  Age: 38 y.o. Date of Admission: 8/14/2020  5:18 AM  Length of stay during current admission: 5      Primary Care Physician: No primary care provider on file. ICU Attending Physician: Dr. Elfego Santa Status: Full Code    Reason for ICU admission:     Acute onset shortness of breath with respiratory distress due to recreational drug abuse. SUBJECTIVE:     OVERNIGHT EVENTS:   During sedation holiday he is still agitated spontaneous awakening trial was unsuccessful and spontaneous breathing trial was not done. Current Evaluation :     Patient is still intubated, sedated and mechanically ventilated. Patient responded to my commands. Still fused and agitated with  active movements  Sedation: Patient is on 3 sedative  as of now with Precedex 400 mcg IV, Versed 1 mg , Seroquel 0.25 mg p.o. twice daily. Review of Systems   Unable to perform ROS: Intubated     Ventilation status : Mechanical ventilation  Ventilatory setting :    Mode : PRVC:   PEEP 5  , Fio2 30, RR 17, Min Vent  8.1 , Spo2 99  ABG : PH 7.3, HCO3 27.9, Pco2 48.7, Po2 85.6,    Secretions : mild    Paralyzed : Not paralyzed    Vitals blood pressure 92/58 off Pressors   Central Lines : Left internal jugular, intact with no leakage. Sibley placed  Urine output of 1.7 L    Diet : Tube feeding  Tube Feeds : OG/NS with rate of 30 mL     Fluids : Total Intake of 2.1 L, Total output of 1.7 L with a net positive of 1.572 L in  last 24 hrs. Pt receiving IV normal saline and tube feedings. Total net of positive of 1.4 L since admission. Significant Labs :Blood cultures no growth in the last 5 days EKG incomplete right bundle branch block.   Albumin 2.9 PCO2 on ABG 48.7 bicarb of 27.9 and pH of 7.3, hemoglobin of 11.8 absolute eosinophil count 0.54       Active Meds: Zosyn 3.3 IV every 8 hours currently on  day, Seroquel 50 mg p.o. twice daily,     Symptomatic Treatment: Chlorhexidine 0.12% solution, Colace 50 mg, fentanyl 20 mcg    AWAKE & FOLLOWING COMMANDS:  [] No   [x] Yes    CURRENT VENTILATION STATUS:     [x] Ventilator  [] BIPAP  [] Nasal Cannula [] Room Air      IF INTUBATED, ET TUBE MARKING AT LOWER LIP:       cms    SECRETIONS Amount:  [] Small [x] Moderate  [] Large  [] None  Color:     [] White [] Colored  [] Bloody    SEDATION:  RAAS Score:  [] Propofol gtt  [x] Versed gtt  [] Ativan gtt   [] No Sedation    PARALYZED:  [x] No    [] Yes    DIARRHEA:                [x] No                [] Yes  (C. Difficile status: [] positive                                                                                                                       [] negative                                                                                                                     [] pending)    VASOPRESSORS:  [x] No    [] Yes    If yes -   [] Levophed       [] Dopamine     [] Vasopressin       [] Dobutamine  [] Phenylephrine         [] Epinephrine    CENTRAL LINES:     [] No   [] Yes   (Date of Insertion:   )           If yes -     [] Right IJ     [x] Left IJ [] Right Femoral [] Left Femoral                   [] Right Subclavian [] Left Subclavian       SALAMANCA'S CATHETER:   [] No   [x] Yes  (Date of Insertion:   )     URINE OUTPUT:            [x] Good   [] Low              [] Anuric      OBJECTIVE:     VITAL SIGNS:  BP (!) 92/58   Pulse 80   Temp 98.2 °F (36.8 °C) (Axillary)   Resp 17   Ht 5' 4\" (1.626 m)   Wt 144 lb 13.5 oz (65.7 kg)   SpO2 99%   BMI 24.86 kg/m²   Tmax over 24 hours:  Temp (24hrs), Av.3 °F (36.8 °C), Min:97 °F (36.1 °C), Max:99 °F (37.2 °C)      Patient Vitals for the past 8 hrs:   BP Temp Temp src Pulse Resp SpO2 Weight   20 0808 -- -- -- 80 17 99 % --   20 0700 (!) 92/58 -- -- 66 17 97 % -- HCO3 27.9;  %O2 Sat 89 on 08/19/20  Lab Results   Component Value Date    PHART 7.218 08/14/2020    NYO8JUD 56.3 08/14/2020    PO2ART 82.3 08/14/2020    PFX9YMV 22.9 08/14/2020    MSG5ZUI 29 08/19/2020    S6VOYGBY 93.1 08/14/2020    FIO2 30.0 08/19/2020     Lactic Acid:   Lab Results   Component Value Date    LACTA NOT REPORTED 08/15/2020    LACTA NOT REPORTED 08/15/2020    LACTA NOT REPORTED 08/15/2020         DATA:  Complete Blood Count:   Recent Labs     08/17/20  0405 08/18/20  0429 08/19/20  0439   WBC 12.6* 8.2 7.1   HGB 12.5* 10.8* 11.8*   MCV 97.7 95.6 94.9    235 261   RBC 3.92* 3.43* 3.75*   HCT 38.3* 32.8* 35.6*   MCH 31.9 31.5 31.5   MCHC 32.6 32.9 33.1   RDW 13.1 12.8 12.5   MPV 10.5 9.5 9.7        PT/INR:    Lab Results   Component Value Date    PROTIME 14.2 08/13/2020    INR 1.1 08/13/2020     PTT:    Lab Results   Component Value Date    APTT 53.2 08/15/2020       Basal Metabolic Profile:   Recent Labs     08/17/20  0405 08/18/20  0429 08/19/20  0439   * 139 140   K 4.0 3.6* 3.7   BUN 11 15 13   CREATININE 1.03 0.92 0.76    103 102   CO2 22 24 25      Magnesium:   Lab Results   Component Value Date    MG 2.3 08/19/2020    MG 2.3 08/18/2020    MG 2.4 08/17/2020     Phosphorus:   Lab Results   Component Value Date    PHOS 3.6 08/19/2020    PHOS 3.2 08/18/2020    PHOS 2.8 08/17/2020     S. Calcium:  Recent Labs     08/19/20  0439   CALCIUM 8.7     S. Ionized Calcium:No results for input(s): IONCA in the last 72 hours.       Urinalysis:   Lab Results   Component Value Date    NITRU NEGATIVE 08/14/2020    COLORU YELLOW 08/14/2020    PHUR 6.0 08/14/2020    WBCUA 0 TO 2 08/14/2020    RBCUA 2 TO 5 08/14/2020    MUCUS NOT REPORTED 08/14/2020    TRICHOMONAS NOT REPORTED 08/14/2020    YEAST NOT REPORTED 08/14/2020    BACTERIA NOT REPORTED 08/14/2020    SPECGRAV 1.014 08/14/2020    LEUKOCYTESUR NEGATIVE 08/14/2020    UROBILINOGEN Normal 08/14/2020    BILIRUBINUR NEGATIVE 08/14/2020    GLUCOSEU 2+ 08/14/2020    KETUA NEGATIVE 08/14/2020    AMORPHOUS 1+ 08/14/2020       CARDIAC ENZYMES: No results for input(s): CKMB, CKMBINDEX, TROPONINI in the last 72 hours. Invalid input(s): CKTOTAL;3  BNP: No results for input(s): BNP in the last 72 hours. LFTS  Recent Labs     08/17/20  0405 08/18/20  0429 08/19/20  0439   ALKPHOS 76 87 144*   ALT 21 15 25   AST 19 15 30   BILITOT 1.30* 0.71 0.66   LABALBU 2.9* 2.9* 2.9*       AMYLASE/LIPASE/AMMONIA  No results for input(s): AMYLASE, LIPASE, AMMONIA in the last 72 hours. Last 3 Blood Glucose:   Recent Labs     08/16/20  1852 08/16/20  2204 08/17/20  0405 08/18/20  0429 08/19/20  0439   GLUCOSE 135* 112* 105* 142* 134*      HgBA1c:  No results found for: LABA1C      TSH:  No results found for: TSH  ANEMIA STUDIES  No results for input(s): LABIRON, TIBC, FERRITIN, [id number], FOLATE, OCCULTBLD in the last 72 hours. Cultures during this admission:     Blood cultures:                 [] None drawn      [] Negative             [x]  Positive (Details:  )  Urine Culture:                   [] None drawn      [] Negative             []  Positive (Details:  )  Sputum Culture:               [] None drawn       [] Negative             [x]  Positive (Details:  )   Endotracheal aspirate:     [] None drawn       [] Negative             []  Positive (Details:  )       ASSESSMENT:     1. Acute hypoxic respiratory failure secondary to drug intake  2. Sepsis secondary to Serratia. PLAN:     1. Acute hypoxic respiratory failure secondary to drug overdose    --Spontaneous awakening trial  unsuccessful yesterday, spontaneous breathing trial was not done. --We will continue the patient on mechanical ventilation and try SAT and SBT today with possible extubation. --We will continue Seroquel 50 mg twice daily p.o. and monitoring Qt interval  -- RT evaluation  -- Monitor secretions  -- input output monitoring.     2.  Sepsis secondary to Serratia    --We will continue Zosyn for next 2 days  -- Repeat Cultures no growth since last 3 days. --IV normal saline for maintenance fluids  --Continue contact precautions for MRSA. PT/OT :    DVT Prophylaxis: Enoxaparin 40 mg subcutaneous  Stress Ulcer prophylaxis : Famotidine 20 mg IV    Disposition: Patient remains in ICU. Maico Rivas M.D.              Department of Internal Medicine/ Critical care  [facility])             8/19/2020, 8:15 AM

## 2020-08-19 NOTE — PLAN OF CARE
Problem: Restraint Use - Nonviolent/Non-Self-Destructive Behavior:  Goal: Absence of restraint-related injury  Description: Absence of restraint-related injury  8/18/2020 2038 by Cuca Hatch RN  Outcome: Met This Shift  8/18/2020 1526 by Ko Martins RN  Outcome: Met This Shift     Problem: OXYGENATION/RESPIRATORY FUNCTION  Goal: Patient will maintain patent airway  8/18/2020 2038 by Cuca Hatch RN  Outcome: Ongoing  8/18/2020 2030 by Lindsay Carmen RCP  Outcome: Ongoing  8/18/2020 1526 by Ko Martins RN  Outcome: Ongoing  Goal: Patient will achieve/maintain normal respiratory rate/effort  Description: Respiratory rate and effort will be within normal limits for the patient  8/18/2020 2038 by Cuca Hatch RN  Outcome: Ongoing  8/18/2020 2030 by Lindsay Carmen RCP  Outcome: Ongoing  8/18/2020 1526 by Ko Martins RN  Outcome: Ongoing     Problem: MECHANICAL VENTILATION  Goal: Patient will maintain patent airway  8/18/2020 2038 by Cuca Hatch RN  Outcome: Ongoing  8/18/2020 2030 by Lindsay Carmen RCP  Outcome: Ongoing  8/18/2020 1526 by Ko Martins RN  Outcome: Ongoing  Goal: Oral health is maintained or improved  8/18/2020 2038 by Cuca Hatch RN  Outcome: Ongoing  8/18/2020 2030 by Lindsay Carmen RCP  Outcome: Ongoing  8/18/2020 1526 by Ko Martins RN  Outcome: Ongoing  Goal: ET tube will be managed safely  8/18/2020 2038 by Cuca Hatch RN  Outcome: Ongoing  8/18/2020 2030 by Lindsay Carmen RCP  Outcome: Ongoing  8/18/2020 1526 by Ko Martnis RN  Outcome: Ongoing  Goal: Ability to express needs and understand communication  8/18/2020 2038 by Cuca Hatch RN  Outcome: Ongoing  8/18/2020 2030 by Lindsay Carmen RCP  Outcome: Ongoing  8/18/2020 1526 by Ko Martins RN  Outcome: Ongoing  Goal: Mobility/activity is maintained at optimum level for patient  8/18/2020 2038 by Cuca Hatch RN  Outcome: Ongoing  8/18/2020 2030 by Tiffanie Taylor Elvin Cespedes RCP  Outcome: Ongoing  8/18/2020 1526 by Selvin Adams RN  Outcome: Ongoing     Problem: SKIN INTEGRITY  Goal: Skin integrity is maintained or improved  8/18/2020 2038 by Madelyn Plummer RN  Outcome: Ongoing  8/18/2020 2030 by Gabriel Durham RCP  Outcome: Ongoing  8/18/2020 1526 by Selvin Adams RN  Outcome: Ongoing     Problem: NUTRITION  Goal: Nutritional status is improving  8/18/2020 2038 by Madelyn Plummer RN  Outcome: Ongoing  8/18/2020 1526 by Selvin Adams RN  Outcome: Ongoing     Problem: Skin Integrity:  Goal: Will show no infection signs and symptoms  Description: Will show no infection signs and symptoms  8/18/2020 2038 by Madelyn Plummer RN  Outcome: Ongoing  8/18/2020 1526 by Selvin Adams RN  Outcome: Ongoing  Goal: Absence of new skin breakdown  Description: Absence of new skin breakdown  8/18/2020 2038 by Madelyn Plummer RN  Outcome: Ongoing  8/18/2020 1526 by Selvin Adams RN  Outcome: Ongoing     Problem: Falls - Risk of:  Goal: Will remain free from falls  Description: Will remain free from falls  8/18/2020 2038 by Madelyn Plummre RN  Outcome: Ongoing  8/18/2020 1526 by Selvin Adams RN  Outcome: Ongoing  Goal: Absence of physical injury  Description: Absence of physical injury  8/18/2020 2038 by Madelyn Plummer RN  Outcome: Ongoing  8/18/2020 1526 by Selvin Adams RN  Outcome: Ongoing     Problem: Nutrition  Goal: Optimal nutrition therapy  Description: Nutrition Problem #1: Inadequate oral intake  Intervention: Food and/or Nutrient Delivery: Start Tube Feeding  Nutritional Goals: provide greater than 75% of nutrition needs     8/18/2020 2038 by Madelyn Plummer RN  Outcome: Ongoing  8/18/2020 1526 by Selvin Adams RN  Outcome: Ongoing     Problem: Restraint Use - Nonviolent/Non-Self-Destructive Behavior:  Goal: Absence of restraint indications  Description: Absence of restraint indications  8/18/2020 2038 by Madelyn Plummer RN  Outcome: Not Met This Shift  8/18/2020 1526 by Charles Dugan RN  Outcome: Not Met This Shift

## 2020-08-19 NOTE — PROGRESS NOTES
Comprehensive Nutrition Assessment    Type and Reason for Visit:  Reassess    Nutrition Recommendations/Plan: Modify Tube Feeding-Suggest change TF to Semi-elemental formula with goal of 60 mL/hr (1728 kcal, 108 g pro/day). Will continue to monitor TF tolerance/adequacy. Nutrition Assessment:  Pt remains on vent. Pt remains on Immune Enhancing tube feeding at 50 mL/hr- residuals 220-275 mL today per RN. No BM yet, but passing gas. Meds/labs reviewed. Malnutrition Assessment:  Malnutrition Status:  Insufficient data    Context:  Acute Illness     Findings of the 6 clinical characteristics of malnutrition:  Energy Intake:  Unable to assess  Weight Loss:  Unable to assess     Body Fat Loss:  Unable to assess     Muscle Mass Loss:  1 - Mild muscle mass loss Temples (temporalis)  Fluid Accumulation:  No significant fluid accumulation     Strength:  Not Performed    Estimated Daily Nutrient Needs:  Energy (kcal):  6622-7155 kcal/day; Weight Used for Energy Requirements:  Current     Protein (g):   g pro/day; Weight Used for Protein Requirements:  Current(1.2-1.5)      Current Nutrition Therapies:    Current Tube Feeding (TF) Orders:  · Formula: Immune Enhancing  · Schedule: Continuous at 50 mL/hr  · Current TF & Flush Orders Provides: 1800 kcal and 113 g pro/day    Anthropometric Measures:  · Height: 5' 4\" (162.6 cm)  · Current Body Weight: 144 lb (65.3 kg)   · Ideal Body Weight: 130 lbs; % Ideal Body Weight  110%  · BMI: 24.7  · BMI Categories: Normal Weight (BMI 22.0 to 24.9) age over 72       Nutrition Diagnosis:   · Inadequate oral intake related to impaired respiratory funtion as evidenced by NPO or clear liquid status due to medical condition, nutrition support - enteral nutrition    Nutrition Interventions:   Food and/or Nutrient Delivery:  Modify Tube Feeding-Suggest change TF to Semi-elemental formula with goal of 60 mL/hr (1728 kcal, 108 g pro/day).   Nutrition Education/Counseling: Education not indicated   Coordination of Nutrition Care:  Continued Inpatient Monitoring    Goals:  provide greater than 75% of nutrition needs       Nutrition Monitoring and Evaluation:   Food/Nutrient Intake Outcomes:  Enteral Nutrition Intake/Tolerance  Physical Signs/Symptoms Outcomes:  Biochemical Data, Nutrition Focused Physical Findings, Skin, Weight     Electronically signed by Alexi Velarde RD, LD on 8/19/20 at 2:47 PM EDT    Contact: [phone]

## 2020-08-19 NOTE — PLAN OF CARE
Problem: OXYGENATION/RESPIRATORY FUNCTION  Goal: Patient will maintain patent airway  8/19/2020 1022 by Marlee Stewart RCP  Outcome: Ongoing  8/18/2020 2038 by Marlen Lou RN  Outcome: Ongoing  8/18/2020 2030 by Eli Morin RCP  Outcome: Ongoing     Problem: OXYGENATION/RESPIRATORY FUNCTION  Goal: Patient will achieve/maintain normal respiratory rate/effort  Description: Respiratory rate and effort will be within normal limits for the patient  8/19/2020 1022 by Marlee Stewart RCP  Outcome: Ongoing  8/18/2020 2038 by Marlen Lou RN  Outcome: Ongoing  8/18/2020 2030 by Eli Morin RCP  Outcome: Ongoing     Problem: MECHANICAL VENTILATION  Goal: Patient will maintain patent airway  8/19/2020 1022 by Marlee Stewart RCP  Outcome: Ongoing  8/18/2020 2038 by Marlen Lou RN  Outcome: Ongoing  8/18/2020 2030 by Eli Morin RCP  Outcome: Ongoing     Problem: MECHANICAL VENTILATION  Goal: Oral health is maintained or improved  8/19/2020 1022 by Marlee Stewart RCP  Outcome: Ongoing  8/18/2020 2038 by Marlen Lou RN  Outcome: Ongoing  8/18/2020 2030 by Eli Morin RCP  Outcome: Ongoing     Problem: MECHANICAL VENTILATION  Goal: ET tube will be managed safely  8/19/2020 1022 by Marlee Stewart RCP  Outcome: Ongoing  8/18/2020 2038 by Marlen Lou RN  Outcome: Ongoing  8/18/2020 2030 by Eli Morin RCP  Outcome: Ongoing     Problem: MECHANICAL VENTILATION  Goal: Ability to express needs and understand communication  8/19/2020 1022 by Marlee Stewart RCP  Outcome: Ongoing  8/18/2020 2038 by Marlen Lou RN  Outcome: Ongoing  8/18/2020 2030 by Eli Morin RCP  Outcome: Ongoing     Problem: MECHANICAL VENTILATION  Goal: Mobility/activity is maintained at optimum level for patient  8/19/2020 1022 by Marele Stewart RCP  Outcome: Ongoing  8/18/2020 2038 by Marlen Lou RN  Outcome: Ongoing  8/18/2020 2030 by Eli Morin RCP  Outcome: Ongoing     Problem: SKIN INTEGRITY  Goal: Skin integrity is maintained or improved  8/19/2020 1022 by Laverta Lesches, RCP  Outcome: Ongoing  8/18/2020 2038 by Sue Steele RN  Outcome: Ongoing  8/18/2020 2030 by Jon Tsai RCP  Outcome: Ongoing

## 2020-08-20 LAB
ABSOLUTE EOS #: 0.52 K/UL (ref 0–0.44)
ABSOLUTE IMMATURE GRANULOCYTE: 0.1 K/UL (ref 0–0.3)
ABSOLUTE LYMPH #: 1.39 K/UL (ref 1.1–3.7)
ABSOLUTE MONO #: 0.64 K/UL (ref 0.1–1.2)
ALBUMIN SERPL-MCNC: 2.9 G/DL (ref 3.5–5.2)
ALBUMIN/GLOBULIN RATIO: 0.8 (ref 1–2.5)
ALP BLD-CCNC: 173 U/L (ref 40–129)
ALT SERPL-CCNC: 81 U/L (ref 5–41)
ANION GAP SERPL CALCULATED.3IONS-SCNC: 13 MMOL/L (ref 9–17)
AST SERPL-CCNC: 84 U/L
BASOPHILS # BLD: 0 % (ref 0–2)
BASOPHILS ABSOLUTE: <0.03 K/UL (ref 0–0.2)
BILIRUB SERPL-MCNC: 1.19 MG/DL (ref 0.3–1.2)
BUN BLDV-MCNC: 11 MG/DL (ref 6–20)
BUN/CREAT BLD: ABNORMAL (ref 9–20)
CALCIUM IONIZED: 1.15 MMOL/L (ref 1.13–1.33)
CALCIUM SERPL-MCNC: 9 MG/DL (ref 8.6–10.4)
CHLORIDE BLD-SCNC: 99 MMOL/L (ref 98–107)
CO2: 25 MMOL/L (ref 20–31)
CREAT SERPL-MCNC: 0.7 MG/DL (ref 0.7–1.2)
CULTURE: NORMAL
DIFFERENTIAL TYPE: ABNORMAL
EKG ATRIAL RATE: 64 BPM
EKG P AXIS: 73 DEGREES
EKG P-R INTERVAL: 140 MS
EKG Q-T INTERVAL: 426 MS
EKG QRS DURATION: 100 MS
EKG QTC CALCULATION (BAZETT): 439 MS
EKG R AXIS: -19 DEGREES
EKG T AXIS: -50 DEGREES
EKG VENTRICULAR RATE: 64 BPM
EOSINOPHILS RELATIVE PERCENT: 9 % (ref 1–4)
GFR AFRICAN AMERICAN: >60 ML/MIN
GFR NON-AFRICAN AMERICAN: >60 ML/MIN
GFR SERPL CREATININE-BSD FRML MDRD: ABNORMAL ML/MIN/{1.73_M2}
GFR SERPL CREATININE-BSD FRML MDRD: ABNORMAL ML/MIN/{1.73_M2}
GLUCOSE BLD-MCNC: 124 MG/DL (ref 70–99)
HCT VFR BLD CALC: 37.8 % (ref 40.7–50.3)
HEMOGLOBIN: 12.4 G/DL (ref 13–17)
IMMATURE GRANULOCYTES: 2 %
LYMPHOCYTES # BLD: 24 % (ref 24–43)
Lab: NORMAL
MAGNESIUM: 2.2 MG/DL (ref 1.6–2.6)
MCH RBC QN AUTO: 31.1 PG (ref 25.2–33.5)
MCHC RBC AUTO-ENTMCNC: 32.8 G/DL (ref 28.4–34.8)
MCV RBC AUTO: 94.7 FL (ref 82.6–102.9)
MONOCYTES # BLD: 11 % (ref 3–12)
NRBC AUTOMATED: 0 PER 100 WBC
PDW BLD-RTO: 12.4 % (ref 11.8–14.4)
PHOSPHORUS: 4 MG/DL (ref 2.5–4.5)
PLATELET # BLD: 314 K/UL (ref 138–453)
PLATELET ESTIMATE: ABNORMAL
PMV BLD AUTO: 9.6 FL (ref 8.1–13.5)
POTASSIUM SERPL-SCNC: 3.9 MMOL/L (ref 3.7–5.3)
RBC # BLD: 3.99 M/UL (ref 4.21–5.77)
RBC # BLD: ABNORMAL 10*6/UL
SEG NEUTROPHILS: 54 % (ref 36–65)
SEGMENTED NEUTROPHILS ABSOLUTE COUNT: 3.03 K/UL (ref 1.5–8.1)
SODIUM BLD-SCNC: 137 MMOL/L (ref 135–144)
SPECIMEN DESCRIPTION: NORMAL
TOTAL CK: 137 U/L (ref 39–308)
TOTAL PROTEIN: 6.5 G/DL (ref 6.4–8.3)
WBC # BLD: 5.7 K/UL (ref 3.5–11.3)
WBC # BLD: ABNORMAL 10*3/UL

## 2020-08-20 PROCEDURE — 93010 ELECTROCARDIOGRAM REPORT: CPT | Performed by: INTERNAL MEDICINE

## 2020-08-20 PROCEDURE — 82550 ASSAY OF CK (CPK): CPT

## 2020-08-20 PROCEDURE — 6370000000 HC RX 637 (ALT 250 FOR IP): Performed by: STUDENT IN AN ORGANIZED HEALTH CARE EDUCATION/TRAINING PROGRAM

## 2020-08-20 PROCEDURE — 94003 VENT MGMT INPAT SUBQ DAY: CPT

## 2020-08-20 PROCEDURE — 2500000003 HC RX 250 WO HCPCS: Performed by: INTERNAL MEDICINE

## 2020-08-20 PROCEDURE — 2500000003 HC RX 250 WO HCPCS: Performed by: STUDENT IN AN ORGANIZED HEALTH CARE EDUCATION/TRAINING PROGRAM

## 2020-08-20 PROCEDURE — 94770 HC ETCO2 MONITOR DAILY: CPT

## 2020-08-20 PROCEDURE — 80053 COMPREHEN METABOLIC PANEL: CPT

## 2020-08-20 PROCEDURE — 6360000002 HC RX W HCPCS: Performed by: STUDENT IN AN ORGANIZED HEALTH CARE EDUCATION/TRAINING PROGRAM

## 2020-08-20 PROCEDURE — 6370000000 HC RX 637 (ALT 250 FOR IP): Performed by: INTERNAL MEDICINE

## 2020-08-20 PROCEDURE — 2700000000 HC OXYGEN THERAPY PER DAY

## 2020-08-20 PROCEDURE — 2580000003 HC RX 258: Performed by: STUDENT IN AN ORGANIZED HEALTH CARE EDUCATION/TRAINING PROGRAM

## 2020-08-20 PROCEDURE — 94761 N-INVAS EAR/PLS OXIMETRY MLT: CPT

## 2020-08-20 PROCEDURE — 99291 CRITICAL CARE FIRST HOUR: CPT | Performed by: INTERNAL MEDICINE

## 2020-08-20 PROCEDURE — 2000000000 HC ICU R&B

## 2020-08-20 PROCEDURE — 93005 ELECTROCARDIOGRAM TRACING: CPT | Performed by: STUDENT IN AN ORGANIZED HEALTH CARE EDUCATION/TRAINING PROGRAM

## 2020-08-20 PROCEDURE — 84100 ASSAY OF PHOSPHORUS: CPT

## 2020-08-20 PROCEDURE — 82330 ASSAY OF CALCIUM: CPT

## 2020-08-20 PROCEDURE — 85025 COMPLETE CBC W/AUTO DIFF WBC: CPT

## 2020-08-20 PROCEDURE — 36415 COLL VENOUS BLD VENIPUNCTURE: CPT

## 2020-08-20 PROCEDURE — 83735 ASSAY OF MAGNESIUM: CPT

## 2020-08-20 RX ORDER — QUETIAPINE FUMARATE 25 MG/1
75 TABLET, FILM COATED ORAL 2 TIMES DAILY
Status: DISCONTINUED | OUTPATIENT
Start: 2020-08-20 | End: 2020-08-21

## 2020-08-20 RX ORDER — OLANZAPINE 10 MG/1
10 INJECTION, POWDER, LYOPHILIZED, FOR SOLUTION INTRAMUSCULAR ONCE
Status: COMPLETED | OUTPATIENT
Start: 2020-08-20 | End: 2020-08-20

## 2020-08-20 RX ADMIN — OLANZAPINE 10 MG: 10 INJECTION, POWDER, LYOPHILIZED, FOR SOLUTION INTRAMUSCULAR at 17:30

## 2020-08-20 RX ADMIN — Medication 100 MG: at 20:13

## 2020-08-20 RX ADMIN — Medication 10 MG/HR: at 17:30

## 2020-08-20 RX ADMIN — ENOXAPARIN SODIUM 40 MG: 40 INJECTION SUBCUTANEOUS at 08:38

## 2020-08-20 RX ADMIN — PIPERACILLIN AND TAZOBACTAM 3.38 G: 3; .375 INJECTION, POWDER, FOR SOLUTION INTRAVENOUS at 06:35

## 2020-08-20 RX ADMIN — DEXMEDETOMIDINE HYDROCHLORIDE 1.4 MCG/KG/HR: 400 INJECTION INTRAVENOUS at 12:19

## 2020-08-20 RX ADMIN — CHLORHEXIDINE GLUCONATE 15 ML: 1.2 RINSE ORAL at 20:13

## 2020-08-20 RX ADMIN — Medication 200 MCG/HR: at 17:12

## 2020-08-20 RX ADMIN — Medication 200 MCG/HR: at 00:08

## 2020-08-20 RX ADMIN — Medication 200 MCG/HR: at 13:47

## 2020-08-20 RX ADMIN — Medication 200 MCG/HR: at 04:25

## 2020-08-20 RX ADMIN — Medication 10 MG/HR: at 02:39

## 2020-08-20 RX ADMIN — QUETIAPINE FUMARATE 50 MG: 25 TABLET ORAL at 08:38

## 2020-08-20 RX ADMIN — DEXMEDETOMIDINE HYDROCHLORIDE 1.4 MCG/KG/HR: 400 INJECTION INTRAVENOUS at 22:16

## 2020-08-20 RX ADMIN — DEXMEDETOMIDINE HYDROCHLORIDE 1.1 MCG/KG/HR: 400 INJECTION INTRAVENOUS at 04:23

## 2020-08-20 RX ADMIN — DEXMEDETOMIDINE HYDROCHLORIDE 1.4 MCG/KG/HR: 400 INJECTION INTRAVENOUS at 17:30

## 2020-08-20 RX ADMIN — Medication 100 MG: at 08:38

## 2020-08-20 RX ADMIN — WATER 2.1 ML: 1 INJECTION INTRAMUSCULAR; INTRAVENOUS; SUBCUTANEOUS at 17:30

## 2020-08-20 RX ADMIN — QUETIAPINE FUMARATE 50 MG: 25 TABLET ORAL at 20:13

## 2020-08-20 RX ADMIN — FAMOTIDINE 20 MG: 10 INJECTION, SOLUTION INTRAVENOUS at 20:13

## 2020-08-20 RX ADMIN — Medication 200 MCG/HR: at 09:05

## 2020-08-20 RX ADMIN — FAMOTIDINE 20 MG: 10 INJECTION, SOLUTION INTRAVENOUS at 08:38

## 2020-08-20 RX ADMIN — DEXMEDETOMIDINE HYDROCHLORIDE 1.2 MCG/KG/HR: 400 INJECTION INTRAVENOUS at 09:01

## 2020-08-20 RX ADMIN — CHLORHEXIDINE GLUCONATE 15 ML: 1.2 RINSE ORAL at 08:41

## 2020-08-20 RX ADMIN — Medication 10 MG/HR: at 09:01

## 2020-08-20 RX ADMIN — SODIUM CHLORIDE, PRESERVATIVE FREE 10 ML: 5 INJECTION INTRAVENOUS at 20:13

## 2020-08-20 RX ADMIN — QUETIAPINE FUMARATE 25 MG: 25 TABLET ORAL at 21:58

## 2020-08-20 RX ADMIN — Medication 200 MCG/HR: at 21:52

## 2020-08-20 ASSESSMENT — PULMONARY FUNCTION TESTS
PIF_VALUE: 14
PIF_VALUE: 12
PIF_VALUE: 16
PIF_VALUE: 15
PIF_VALUE: 19
PIF_VALUE: 15
PIF_VALUE: 17
PIF_VALUE: 28

## 2020-08-20 NOTE — PROGRESS NOTES
Patient acutely agitated attempting to pull out ET tube bucking is a danger to himself and others patient given 10 mg IM Zyprexa with good effect continue to monitor    [address], [place], RD  PGY-2 Emergency Medicine  Critical Care

## 2020-08-20 NOTE — PLAN OF CARE
Problem: OXYGENATION/RESPIRATORY FUNCTION  Goal: Patient will maintain patent airway  8/20/2020 1022 by Thomas Rahman RCP  Outcome: Ongoing     Problem: OXYGENATION/RESPIRATORY FUNCTION  Goal: Patient will achieve/maintain normal respiratory rate/effort  Description: Respiratory rate and effort will be within normal limits for the patient  8/20/2020 1022 by Thomas Rahman RCP  Outcome: Ongoing     Problem: MECHANICAL VENTILATION  Goal: Patient will maintain patent airway  8/20/2020 1022 by Thomas Rahman RCP  Outcome: Ongoing     Problem: MECHANICAL VENTILATION  Goal: Oral health is maintained or improved  8/20/2020 1022 by Thomas Rahman RCP  Outcome: Ongoing     Problem: MECHANICAL VENTILATION  Goal: ET tube will be managed safely  8/20/2020 1022 by Thomas Rahman RCP  Outcome: Ongoing     Problem: MECHANICAL VENTILATION  Goal: Ability to express needs and understand communication  8/20/2020 1022 by Thomas Rahman RCP  Outcome: Ongoing     Problem: MECHANICAL VENTILATION  Goal: Mobility/activity is maintained at optimum level for patient  8/20/2020 1022 by Thomas Rahman RCP  Outcome: Ongoing     Problem: SKIN INTEGRITY  Goal: Skin integrity is maintained or improved  8/20/2020 1022 by Thomas Rahman RCP  Outcome: Ongoing

## 2020-08-20 NOTE — PROGRESS NOTES
Critical Care Team - Daily Progress Note      Date and time: 8/20/2020 7:21 AM  Patient's name:  Varghese Rosales Record Number: [id number]  Patient's account/billing number: [de-identified]  Patient's YOB: 1985  Age: 38 y.o. Date of Admission: 8/14/2020  5:18 AM  Length of stay during current admission: 6      Primary Care Physician: No primary care provider on file. ICU Attending Physician: Dr. Paras Ceja     Code Status: Full Code    Reason for ICU admission: Acute hypoxic respiratory failure secondary to recreational drug abuse. SUBJECTIVE:     OVERNIGHT EVENTS:   No acute events overnight. Patient showed signs of improvement with increased dose of Seroquel he is less agitated. Current Evaluation :     Patient is still intubated sedated and mechanically ventilated, patient did followed my commands. Ventilation status : Mechanical ventilation. Ventilatory setting :    Mode : PRVC:   PEEP 5  , Fio2 30, RR 15, Min Vent  7.2. Secretions : Minimal    Sedation: on Versed 1 mg IV, Precedex 400 mcg, Seroquel 50 mg twice daily p.o and fentanyl 175 mcg IV  Paralyzed : not paralyzed     Review of Systems   Unable to perform ROS: Intubated     Vitals stable with pressure of 9160, Pressors are stopped  Central Lines : Left internal jugular vein, intact with normal leakage. Sibley placed with Urine output of 2.6 L    Diet : Tube feeding  Tube Feeds : OG/NS with rate of 30 mL with 200ml  Residual in the last 24 hours    Fluids : Total Intake of 3 L, Total output of 2.6 L with a net positive of 4 1 6 mL in  last 24 hrs. Pt receiving 2.1 L of IV normal saline, 892 mL through nasogastric feeding  Total net of 1.82 L since admission.     Significant Labs :    Blood : ALT AST elevated  Eosinophils 9%  Electrolytes : Magnesium, phosphorus and potassium levels normalized  Urine : BUN/creatinine ratio normal  Imaging: EKG incomplete right bundle branch block no longer present QT interval increased from 402 .     Culture : No growth since the last 4 days    Active Meds:   piperacillin tazobactam 3.375 g every 8 hours IV, Seroquel 50 mg twice daily p.o., Precedex 400 mcg, fentanyl 20 mcg IV, Versed 1 mg IV,enoxaparin 40 mg SC, Pepcid 20 mg IV,    Symptomatic Treatment: Chlorhexidine 0.12% solution, docusate 50 mg,    AWAKE & FOLLOWING COMMANDS:  [] No   [x] Yes    CURRENT VENTILATION STATUS:     [x] Ventilator  [] BIPAP  [] Nasal Cannula [] Room Air      IF INTUBATED, ET TUBE MARKING AT LOWER LIP:       cms    SECRETIONS Amount:  [x] Small [] Moderate  [] Large  [] None  Color:     [] White [] Colored  [] Bloody    SEDATION:  RAAS Score:  [] Propofol gtt  [x] Versed gtt  [] Ativan gtt   [] No Sedation    PARALYZED:  [x] No    [] Yes    DIARRHEA:                [x] No                [] Yes  (C. Difficile status: [] positive                                                                                                                       [] negative                                                                                                                     [] pending)    VASOPRESSORS:  [x] No    [] Yes    If yes -   [] Levophed       [] Dopamine     [] Vasopressin       [] Dobutamine  [] Phenylephrine         [] Epinephrine    CENTRAL LINES:     [] No   [] Yes   (Date of Insertion:   )           If yes -     [] Right IJ     [x] Left IJ [] Right Femoral [] Left Femoral                   [] Right Subclavian [] Left Subclavian       SALAMANCA'S CATHETER:   [] No   [x] Yes  (Date of Insertion:   )     URINE OUTPUT:            [x] Good   [] Low              [] Anuric      OBJECTIVE:     VITAL SIGNS:  BP 91/60   Pulse 65   Temp 98.2 °F (36.8 °C) (Oral)   Resp 15   Ht 5' 4\" (1.626 m)   Wt 143 lb 15.4 oz (65.3 kg)   SpO2 96%   BMI 24.71 kg/m²   Tmax over 24 hours:  Temp (24hrs), Av.5 °F (36.9 °C), Min:98 °F (36.7 °C), Max:99.1 °F (37.3 °C)      Patient Vitals for the past 8 hrs:   BP Temp Temp src Pulse Resp SpO2 Weight   08/20/20 0600 -- -- -- -- -- -- 143 lb 15.4 oz (65.3 kg)   08/20/20 0500 91/60 -- -- 65 15 96 % --   08/20/20 0400 99/65 98.2 °F (36.8 °C) Oral 63 15 93 % --   08/20/20 0345 -- -- -- 63 15 96 % --   08/20/20 0300 100/66 -- -- 63 15 96 % --   08/20/20 0200 112/77 -- -- 62 15 96 % --   08/20/20 0100 111/76 -- -- 65 15 96 % --   08/20/20 0000 117/79 98 °F (36.7 °C) Axillary 64 15 96 % --   08/19/20 2339 -- -- -- 63 15 97 % --         Intake/Output Summary (Last 24 hours) at 8/20/2020 0721  Last data filed at 8/20/2020 0600  Gross per 24 hour   Intake 3016.1 ml   Output 2600 ml   Net 416.1 ml     Date 08/20/20 0000 - 08/20/20 2359   Shift 9568-6984 4120-9068 8422-5374 24 Hour Total   INTAKE   I.V.(mL/kg) 766(11.7)   766(11.7)   NG/GT(mL/kg) 143(2.2)   143(2.2)   Shift Total(mL/kg) 987(94.1)   909(13.9)   OUTPUT   Urine(mL/kg/hr) 1500   1500   Shift Total(mL/kg) 1500(23)   1500(23)   Weight (kg) 65.3 65.3 65.3 65.3     Wt Readings from Last 3 Encounters:   08/20/20 143 lb 15.4 oz (65.3 kg)   08/13/20 150 lb (68 kg)   08/10/19 126 lb 15.8 oz (57.6 kg)     Body mass index is 24.71 kg/m². PHYSICAL EXAM:  Constitutional: Patient still intubated sedated and mechanically ventilated, confused and less agitated and follows my commands. EENT: PERRLA, sclera clear, anicteric, oropharynx throwing up some secretions, no lesions, . Neck: Supple, symmetrical, trachea midline, no adenopathy, left internal jugular vein catheter still in place with no active discharge or leakage   Respiratory: clear to auscultation bilateral vesicular,  no wheezes or rales and unlabored breathing. No intercostal tenderness  Cardiovascular: regular rate and rhythm, normal S1, S2, no murmur noted   Abdomen: soft, nontender, nondistended, no masses or organomegaly  Neurologic: Sedated but can open his eyes and following my commands.   Will do respond to pain with sensory and motor systems are grossly intact  Extremities: peripheral pulses normal, no pedal edema, no clubbing or cyanosis    MEDICATIONS:  Scheduled Meds:   QUEtiapine  50 mg Oral BID    enoxaparin  40 mg Subcutaneous Daily    chlorhexidine  15 mL Mouth/Throat BID    piperacillin-tazobactam  3.375 g Intravenous Q8H    famotidine (PEPCID) injection  20 mg Intravenous BID    sodium chloride flush  10 mL Intravenous 2 times per day    sodium chloride flush  10 mL Intravenous 2 times per day    docusate  100 mg Per NG tube BID     Continuous Infusions:   dexmedetomidine 1.1 mcg/kg/hr (08/20/20 0423)    sodium chloride 50 mL/hr at 08/18/20 0845    fentaNYL 200 mcg/hr (08/20/20 0425)    midazolam 10 mg/hr (08/20/20 0239)     PRN Meds:   albuterol, 2.5 mg, As Directed RT PRN  sodium chloride flush, 10 mL, PRN  sodium chloride flush, 10 mL, PRN  acetaminophen, 650 mg, Q6H PRN    Or  acetaminophen, 650 mg, Q6H PRN  polyethylene glycol, 17 g, Daily PRN  promethazine, 12.5 mg, Q6H PRN    Or  ondansetron, 4 mg, Q6H PRN  artificial tears, , PRN  ipratropium-albuterol, 1 ampule, Q4H PRN      SUPPORT DEVICES: [x] Ventilator [] BIPAP  [] Nasal Cannula [] Room Air    VENT SETTINGS (Comprehensive) (if applicable):   PRVC mode, FiO2 30%, PEEP 5, Respiratory Rate 15, Minute vent 7.2  Vent Information  $Ventilation: $Subsequent Day  Skin Assessment: Clean, dry, & intact  Suction Catheter Diameter: 14  Equipment Changed: HME  Vent Type: Servo i  Vent Mode: PRVC  Vt Ordered: 490 mL  Rate Set: 15 bmp  Pressure Support: (S) 6 cmH20  FiO2 : 30 %  SpO2: 96 %  SpO2/FiO2 ratio: 310  Sensitivity: 4  PEEP/CPAP: 5  I Time/ I Time %: 0.9 s  Humidification Source: HME  Nitric Oxide/Epoprostenol In Use?: No  Additional Respiratory  Assessments  Pulse: 65  Resp: 15  SpO2: 96 %  End Tidal CO2: 37 (%)  Position: Semi-Omer's  Humidification Source: HME  Oral Care Completed?: Yes  Oral Care: Mouth swabbed, Mouth moisturizer, Mouth suctioned  Subglottic Suction Done?: No  Cuff Pressure (cm H2O): GLUCOSEU 2+ 08/14/2020    KETUA NEGATIVE 08/14/2020    AMORPHOUS 1+ 08/14/2020       CARDIAC ENZYMES: No results for input(s): CKMB, CKMBINDEX, TROPONINI in the last 72 hours. Invalid input(s): CKTOTAL;3  BNP: No results for input(s): BNP in the last 72 hours. LFTS  Recent Labs     08/18/20  0429 08/19/20  0439 08/20/20  0423   ALKPHOS 87 144* 173*   ALT 15 25 81*   AST 15 30 84*   BILITOT 0.71 0.66 1.19   LABALBU 2.9* 2.9* 2.9*       AMYLASE/LIPASE/AMMONIA  No results for input(s): AMYLASE, LIPASE, AMMONIA in the last 72 hours. Last 3 Blood Glucose:   Recent Labs     08/18/20  0429 08/19/20 0439 08/20/20 0423   GLUCOSE 142* 134* 124*      HgBA1c:  No results found for: LABA1C      TSH:  No results found for: TSH  ANEMIA STUDIES  No results for input(s): LABIRON, TIBC, FERRITIN, OBUYLYPZ15, FOLATE, OCCULTBLD in the last 72 hours. Cultures during this admission:     Blood cultures:                 [] None drawn      [] Negative             [x]  Positive (Details:  )  Urine Culture:                   [] None drawn      [] Negative             []  Positive (Details:  )  Sputum Culture:               [] None drawn       [] Negative             [x]  Positive (Details:  )   Endotracheal aspirate:     [] None drawn       [] Negative             []  Positive (Details:  )     MRSA found  Positive Serratia blood cultures. But repeat blood cultures no growth since last 4 days    ASSESSMENT:   1. Acute hypoxic respiratory failure secondary to drug intake  2. Sepsis secondary to Serratia. PLAN:     1. Acute hypoxic respiratory failure secondary to drug intake    --Still intubated patient is showing signs of less agitation and irritation with increased dose of Seroquel 50 mg twice daily. - spontaneous breathing trial today with weaning of Sedation.  --RT evaluation. 2.  Sepsis secondary to Serratia    --Patient on Zosyn day 5. --Repeat cultures no growth since last 4 days.   --Continue piperacillin

## 2020-08-20 NOTE — PLAN OF CARE
Problem: OXYGENATION/RESPIRATORY FUNCTION  Goal: Patient will maintain patent airway  8/19/2020 2054 by Hiram Hardin RN  Outcome: Ongoing  8/19/2020 1022 by Ivon Duran RCP  Outcome: Ongoing  Goal: Patient will achieve/maintain normal respiratory rate/effort  Description: Respiratory rate and effort will be within normal limits for the patient  8/19/2020 2054 by Hiram Hardin RN  Outcome: Ongoing  8/19/2020 1022 by Ivon Duran RCP  Outcome: Ongoing     Problem: MECHANICAL VENTILATION  Goal: Patient will maintain patent airway  8/19/2020 2054 by Hiram Hardin RN  Outcome: Ongoing  8/19/2020 1022 by Ivon Duran RCP  Outcome: Ongoing  Goal: Oral health is maintained or improved  8/19/2020 2054 by Hiram Hardin RN  Outcome: Ongoing  8/19/2020 1022 by Ivon Duran RCP  Outcome: Ongoing  Goal: ET tube will be managed safely  8/19/2020 2054 by Hiram Hardin RN  Outcome: Ongoing  8/19/2020 1022 by Ivon Duran RCP  Outcome: Ongoing  Goal: Ability to express needs and understand communication  8/19/2020 2054 by Hiram Hardin RN  Outcome: Ongoing  8/19/2020 1022 by Ivon Duran RCP  Outcome: Ongoing  Goal: Mobility/activity is maintained at optimum level for patient  8/19/2020 2054 by Hiram Hardin RN  Outcome: Ongoing  8/19/2020 1022 by Ivon Duran RCP  Outcome: Ongoing     Problem: SKIN INTEGRITY  Goal: Skin integrity is maintained or improved  8/19/2020 2054 by Hiram Hardin RN  Outcome: Ongoing  8/19/2020 1022 by Ivon Duran RCP  Outcome: Ongoing     Problem: NUTRITION  Goal: Nutritional status is improving  Outcome: Ongoing     Problem: Skin Integrity:  Goal: Will show no infection signs and symptoms  Description: Will show no infection signs and symptoms  8/19/2020 2054 by Hiram Hardin RN  Outcome: Ongoing  8/19/2020 1022 by Ivon Duran RCP  Outcome: Ongoing  Goal: Absence of new skin breakdown  Description: Absence of new skin breakdown  8/19/2020 2054 by Caryle Armor, RN  Outcome: Ongoing  8/19/2020 1022 by Asha Dumont RCP  Outcome: Ongoing     Problem: Falls - Risk of:  Goal: Will remain free from falls  Description: Will remain free from falls  Outcome: Ongoing  Goal: Absence of physical injury  Description: Absence of physical injury  Outcome: Ongoing     Problem: Nutrition  Goal: Optimal nutrition therapy  Description: Nutrition Problem #1: Inadequate oral intake  Intervention: Food and/or Nutrient Delivery: Start Tube Feeding  Nutritional Goals: provide greater than 75% of nutrition needs     Outcome: Ongoing     Problem: Restraint Use - Nonviolent/Non-Self-Destructive Behavior:  Goal: Absence of restraint indications  Description: Absence of restraint indications  Outcome: Not Met This Shift  Goal: Absence of restraint-related injury  Description: Absence of restraint-related injury  Outcome: Not Met This Shift

## 2020-08-20 NOTE — PLAN OF CARE
Problem: OXYGENATION/RESPIRATORY FUNCTION  Goal: Patient will maintain patent airway  8/19/2020 2135 by Mejia Kaur RCP  Outcome: Ongoing  8/19/2020 2054 by Shena Bender RN  Outcome: Ongoing  8/19/2020 1022 by Curt Otoole RCP  Outcome: Ongoing     Problem: OXYGENATION/RESPIRATORY FUNCTION  Goal: Patient will achieve/maintain normal respiratory rate/effort  Description: Respiratory rate and effort will be within normal limits for the patient  8/19/2020 2135 by Mejia Kaur RCP  Outcome: Ongoing  8/19/2020 2054 by Shena Bender RN  Outcome: Ongoing  8/19/2020 1022 by Curt Otoole RCP  Outcome: Ongoing     Problem: MECHANICAL VENTILATION  Goal: Patient will maintain patent airway  8/19/2020 2135 by Mejia Kaur RCP  Outcome: Ongoing  8/19/2020 2054 by Shena Bender RN  Outcome: Ongoing  8/19/2020 1022 by Curt Otoole RCP  Outcome: Ongoing     Problem: MECHANICAL VENTILATION  Goal: Oral health is maintained or improved  8/19/2020 2135 by Mejia Kaur RCP  Outcome: Ongoing  8/19/2020 2054 by Shena Bender RN  Outcome: Ongoing  8/19/2020 1022 by Curt Otoole RCP  Outcome: Ongoing     Problem: MECHANICAL VENTILATION  Goal: ET tube will be managed safely  8/19/2020 2135 by Mejia Kaur RCP  Outcome: Ongoing  8/19/2020 2054 by Shena Bender RN  Outcome: Ongoing  8/19/2020 1022 by Curt Otoole RCP  Outcome: Ongoing     Problem: MECHANICAL VENTILATION  Goal: Ability to express needs and understand communication  8/19/2020 2135 by Mejia Kaur RCP  Outcome: Ongoing  8/19/2020 2054 by Shena Bender RN  Outcome: Ongoing  8/19/2020 1022 by Curt Otoole RCP  Outcome: Ongoing     Problem: MECHANICAL VENTILATION  Goal: Mobility/activity is maintained at optimum level for patient  8/19/2020 2135 by Mejia Kaur RCP  Outcome: Ongoing  8/19/2020 2054 by Shena Bender RN  Outcome: Ongoing  8/19/2020 1022 by Curt Otoole EDGAR  Outcome: Ongoing     Problem: SKIN INTEGRITY  Goal: Skin integrity is maintained or improved  8/19/2020 2135 by Elma Jacobsen RCP  Outcome: Ongoing  8/19/2020 2054 by Quinn Coello RN  Outcome: Ongoing  8/19/2020 1022 by Nicholas Foreman RCP  Outcome: Ongoing     Problem: Skin Integrity:  Goal: Will show no infection signs and symptoms  Description: Will show no infection signs and symptoms  8/19/2020 2135 by Elma Jacobsen RCP  Outcome: Ongoing  8/19/2020 2054 by Quinn Coello RN  Outcome: Ongoing  8/19/2020 1022 by Nicholas Foreman RCP  Outcome: Ongoing     Problem: Skin Integrity:  Goal: Absence of new skin breakdown  Description: Absence of new skin breakdown  8/19/2020 2135 by Elma Jacobsen RCP  Outcome: Ongoing  8/19/2020 2054 by Quinn Coello RN  Outcome: Ongoing  8/19/2020 1022 by Nicholas Foreman RCP  Outcome: Ongoing

## 2020-08-21 LAB
ABSOLUTE EOS #: 0.47 K/UL (ref 0–0.44)
ABSOLUTE IMMATURE GRANULOCYTE: 0.12 K/UL (ref 0–0.3)
ABSOLUTE LYMPH #: 2.06 K/UL (ref 1.1–3.7)
ABSOLUTE MONO #: 0.8 K/UL (ref 0.1–1.2)
ALBUMIN SERPL-MCNC: 3 G/DL (ref 3.5–5.2)
ALBUMIN/GLOBULIN RATIO: 0.8 (ref 1–2.5)
ALP BLD-CCNC: 168 U/L (ref 40–129)
ALT SERPL-CCNC: 90 U/L (ref 5–41)
ANION GAP SERPL CALCULATED.3IONS-SCNC: 15 MMOL/L (ref 9–17)
AST SERPL-CCNC: 59 U/L
BASOPHILS # BLD: 1 % (ref 0–2)
BASOPHILS ABSOLUTE: 0.06 K/UL (ref 0–0.2)
BILIRUB SERPL-MCNC: 0.57 MG/DL (ref 0.3–1.2)
BUN BLDV-MCNC: 13 MG/DL (ref 6–20)
BUN/CREAT BLD: ABNORMAL (ref 9–20)
CALCIUM IONIZED: 1.2 MMOL/L (ref 1.13–1.33)
CALCIUM SERPL-MCNC: 9 MG/DL (ref 8.6–10.4)
CHLORIDE BLD-SCNC: 98 MMOL/L (ref 98–107)
CO2: 23 MMOL/L (ref 20–31)
CREAT SERPL-MCNC: 0.66 MG/DL (ref 0.7–1.2)
DIFFERENTIAL TYPE: ABNORMAL
EKG ATRIAL RATE: 67 BPM
EKG P AXIS: 65 DEGREES
EKG P-R INTERVAL: 138 MS
EKG Q-T INTERVAL: 410 MS
EKG QRS DURATION: 108 MS
EKG QTC CALCULATION (BAZETT): 433 MS
EKG R AXIS: -25 DEGREES
EKG T AXIS: -33 DEGREES
EKG VENTRICULAR RATE: 67 BPM
EOSINOPHILS RELATIVE PERCENT: 6 % (ref 1–4)
GFR AFRICAN AMERICAN: >60 ML/MIN
GFR NON-AFRICAN AMERICAN: >60 ML/MIN
GFR SERPL CREATININE-BSD FRML MDRD: ABNORMAL ML/MIN/{1.73_M2}
GFR SERPL CREATININE-BSD FRML MDRD: ABNORMAL ML/MIN/{1.73_M2}
GLUCOSE BLD-MCNC: 117 MG/DL (ref 70–99)
HCT VFR BLD CALC: 37.9 % (ref 40.7–50.3)
HEMOGLOBIN: 12.4 G/DL (ref 13–17)
IMMATURE GRANULOCYTES: 2 %
LYMPHOCYTES # BLD: 25 % (ref 24–43)
MAGNESIUM: 2.1 MG/DL (ref 1.6–2.6)
MCH RBC QN AUTO: 31.1 PG (ref 25.2–33.5)
MCHC RBC AUTO-ENTMCNC: 32.7 G/DL (ref 28.4–34.8)
MCV RBC AUTO: 95 FL (ref 82.6–102.9)
MONOCYTES # BLD: 10 % (ref 3–12)
NRBC AUTOMATED: 0 PER 100 WBC
PDW BLD-RTO: 12.4 % (ref 11.8–14.4)
PHOSPHORUS: 3.5 MG/DL (ref 2.5–4.5)
PLATELET # BLD: 375 K/UL (ref 138–453)
PLATELET ESTIMATE: ABNORMAL
PMV BLD AUTO: 9.8 FL (ref 8.1–13.5)
POTASSIUM SERPL-SCNC: 3.9 MMOL/L (ref 3.7–5.3)
RBC # BLD: 3.99 M/UL (ref 4.21–5.77)
RBC # BLD: ABNORMAL 10*6/UL
SEG NEUTROPHILS: 56 % (ref 36–65)
SEGMENTED NEUTROPHILS ABSOLUTE COUNT: 4.66 K/UL (ref 1.5–8.1)
SODIUM BLD-SCNC: 136 MMOL/L (ref 135–144)
TOTAL CK: 219 U/L (ref 39–308)
TOTAL PROTEIN: 6.7 G/DL (ref 6.4–8.3)
WBC # BLD: 8.2 K/UL (ref 3.5–11.3)
WBC # BLD: ABNORMAL 10*3/UL

## 2020-08-21 PROCEDURE — 6370000000 HC RX 637 (ALT 250 FOR IP): Performed by: STUDENT IN AN ORGANIZED HEALTH CARE EDUCATION/TRAINING PROGRAM

## 2020-08-21 PROCEDURE — 83735 ASSAY OF MAGNESIUM: CPT

## 2020-08-21 PROCEDURE — 84100 ASSAY OF PHOSPHORUS: CPT

## 2020-08-21 PROCEDURE — 2500000003 HC RX 250 WO HCPCS: Performed by: INTERNAL MEDICINE

## 2020-08-21 PROCEDURE — 2000000000 HC ICU R&B

## 2020-08-21 PROCEDURE — 93005 ELECTROCARDIOGRAM TRACING: CPT | Performed by: STUDENT IN AN ORGANIZED HEALTH CARE EDUCATION/TRAINING PROGRAM

## 2020-08-21 PROCEDURE — 99291 CRITICAL CARE FIRST HOUR: CPT | Performed by: INTERNAL MEDICINE

## 2020-08-21 PROCEDURE — 6360000002 HC RX W HCPCS: Performed by: STUDENT IN AN ORGANIZED HEALTH CARE EDUCATION/TRAINING PROGRAM

## 2020-08-21 PROCEDURE — 94770 HC ETCO2 MONITOR DAILY: CPT

## 2020-08-21 PROCEDURE — 2580000003 HC RX 258: Performed by: STUDENT IN AN ORGANIZED HEALTH CARE EDUCATION/TRAINING PROGRAM

## 2020-08-21 PROCEDURE — 80053 COMPREHEN METABOLIC PANEL: CPT

## 2020-08-21 PROCEDURE — 82330 ASSAY OF CALCIUM: CPT

## 2020-08-21 PROCEDURE — 6370000000 HC RX 637 (ALT 250 FOR IP): Performed by: INTERNAL MEDICINE

## 2020-08-21 PROCEDURE — 6360000002 HC RX W HCPCS

## 2020-08-21 PROCEDURE — 2500000003 HC RX 250 WO HCPCS: Performed by: STUDENT IN AN ORGANIZED HEALTH CARE EDUCATION/TRAINING PROGRAM

## 2020-08-21 PROCEDURE — 94003 VENT MGMT INPAT SUBQ DAY: CPT

## 2020-08-21 PROCEDURE — 36415 COLL VENOUS BLD VENIPUNCTURE: CPT

## 2020-08-21 PROCEDURE — 85025 COMPLETE CBC W/AUTO DIFF WBC: CPT

## 2020-08-21 PROCEDURE — 94761 N-INVAS EAR/PLS OXIMETRY MLT: CPT

## 2020-08-21 PROCEDURE — 82550 ASSAY OF CK (CPK): CPT

## 2020-08-21 PROCEDURE — 2700000000 HC OXYGEN THERAPY PER DAY

## 2020-08-21 RX ORDER — HALOPERIDOL 5 MG/ML
5 INJECTION INTRAMUSCULAR
Status: COMPLETED | OUTPATIENT
Start: 2020-08-21 | End: 2020-08-24

## 2020-08-21 RX ORDER — LORAZEPAM 2 MG/ML
1 INJECTION INTRAMUSCULAR EVERY 4 HOURS PRN
Status: DISCONTINUED | OUTPATIENT
Start: 2020-08-21 | End: 2020-08-24

## 2020-08-21 RX ORDER — PROPOFOL 10 MG/ML
INJECTION, EMULSION INTRAVENOUS
Status: COMPLETED
Start: 2020-08-21 | End: 2020-08-21

## 2020-08-21 RX ORDER — HALOPERIDOL 5 MG/ML
INJECTION INTRAMUSCULAR
Status: COMPLETED
Start: 2020-08-21 | End: 2020-08-21

## 2020-08-21 RX ORDER — OLANZAPINE 10 MG/1
5 INJECTION, POWDER, LYOPHILIZED, FOR SOLUTION INTRAMUSCULAR ONCE
Status: COMPLETED | OUTPATIENT
Start: 2020-08-21 | End: 2020-08-21

## 2020-08-21 RX ORDER — QUETIAPINE FUMARATE 100 MG/1
100 TABLET, FILM COATED ORAL 2 TIMES DAILY
Status: DISCONTINUED | OUTPATIENT
Start: 2020-08-21 | End: 2020-08-22

## 2020-08-21 RX ORDER — OLANZAPINE 10 MG/1
5 INJECTION, POWDER, LYOPHILIZED, FOR SOLUTION INTRAMUSCULAR ONCE
Status: CANCELLED | OUTPATIENT
Start: 2020-08-21 | End: 2020-08-21

## 2020-08-21 RX ORDER — PROPOFOL 10 MG/ML
10 INJECTION, EMULSION INTRAVENOUS
Status: DISCONTINUED | OUTPATIENT
Start: 2020-08-21 | End: 2020-08-24

## 2020-08-21 RX ORDER — HALOPERIDOL 5 MG/ML
5 INJECTION INTRAMUSCULAR ONCE
Status: COMPLETED | OUTPATIENT
Start: 2020-08-21 | End: 2020-08-21

## 2020-08-21 RX ADMIN — QUETIAPINE FUMARATE 100 MG: 100 TABLET ORAL at 20:53

## 2020-08-21 RX ADMIN — OLANZAPINE 5 MG: 10 INJECTION, POWDER, LYOPHILIZED, FOR SOLUTION INTRAMUSCULAR at 04:04

## 2020-08-21 RX ADMIN — SODIUM CHLORIDE: 9 INJECTION, SOLUTION INTRAVENOUS at 02:12

## 2020-08-21 RX ADMIN — Medication 200 MCG/HR: at 21:18

## 2020-08-21 RX ADMIN — LORAZEPAM 1 MG: 2 INJECTION INTRAMUSCULAR; INTRAVENOUS at 03:01

## 2020-08-21 RX ADMIN — FAMOTIDINE 20 MG: 10 INJECTION, SOLUTION INTRAVENOUS at 20:53

## 2020-08-21 RX ADMIN — Medication 100 MG: at 08:42

## 2020-08-21 RX ADMIN — QUETIAPINE FUMARATE 75 MG: 25 TABLET ORAL at 08:42

## 2020-08-21 RX ADMIN — HALOPERIDOL LACTATE 5 MG: 5 INJECTION, SOLUTION INTRAMUSCULAR at 16:41

## 2020-08-21 RX ADMIN — Medication 200 MCG/HR: at 14:00

## 2020-08-21 RX ADMIN — Medication 200 MCG/HR: at 01:40

## 2020-08-21 RX ADMIN — SODIUM CHLORIDE, PRESERVATIVE FREE 10 ML: 5 INJECTION INTRAVENOUS at 20:53

## 2020-08-21 RX ADMIN — DEXMEDETOMIDINE HYDROCHLORIDE 1.4 MCG/KG/HR: 400 INJECTION INTRAVENOUS at 21:06

## 2020-08-21 RX ADMIN — Medication 200 MCG/HR: at 09:53

## 2020-08-21 RX ADMIN — HALOPERIDOL LACTATE 5 MG: 5 INJECTION, SOLUTION INTRAMUSCULAR at 18:09

## 2020-08-21 RX ADMIN — DEXMEDETOMIDINE HYDROCHLORIDE 1.4 MCG/KG/HR: 400 INJECTION INTRAVENOUS at 12:07

## 2020-08-21 RX ADMIN — DEXMEDETOMIDINE HYDROCHLORIDE 1.4 MCG/KG/HR: 400 INJECTION INTRAVENOUS at 16:43

## 2020-08-21 RX ADMIN — DEXMEDETOMIDINE HYDROCHLORIDE 1.4 MCG/KG/HR: 400 INJECTION INTRAVENOUS at 07:39

## 2020-08-21 RX ADMIN — CHLORHEXIDINE GLUCONATE 15 ML: 1.2 RINSE ORAL at 08:42

## 2020-08-21 RX ADMIN — PROPOFOL 30 MCG/KG/MIN: 10 INJECTION, EMULSION INTRAVENOUS at 20:00

## 2020-08-21 RX ADMIN — FAMOTIDINE 20 MG: 10 INJECTION, SOLUTION INTRAVENOUS at 08:42

## 2020-08-21 RX ADMIN — WATER 2.1 ML: 1 INJECTION INTRAMUSCULAR; INTRAVENOUS; SUBCUTANEOUS at 04:04

## 2020-08-21 RX ADMIN — Medication 10 MG/HR: at 13:57

## 2020-08-21 RX ADMIN — Medication 100 MG: at 20:53

## 2020-08-21 RX ADMIN — Medication 200 MCG/HR: at 17:02

## 2020-08-21 RX ADMIN — Medication 10 MG/HR: at 04:09

## 2020-08-21 RX ADMIN — Medication 200 MCG/HR: at 05:07

## 2020-08-21 RX ADMIN — DEXMEDETOMIDINE HYDROCHLORIDE 1.4 MCG/KG/HR: 400 INJECTION INTRAVENOUS at 02:06

## 2020-08-21 RX ADMIN — ENOXAPARIN SODIUM 40 MG: 40 INJECTION SUBCUTANEOUS at 08:41

## 2020-08-21 RX ADMIN — LORAZEPAM 1 MG: 2 INJECTION INTRAMUSCULAR; INTRAVENOUS at 15:28

## 2020-08-21 ASSESSMENT — PAIN SCALES - GENERAL
PAINLEVEL_OUTOF10: 6
PAINLEVEL_OUTOF10: 7

## 2020-08-21 ASSESSMENT — PULMONARY FUNCTION TESTS
PIF_VALUE: 19
PIF_VALUE: 13
PIF_VALUE: 20
PIF_VALUE: 19
PIF_VALUE: 18
PIF_VALUE: 11

## 2020-08-21 NOTE — PLAN OF CARE
Problem: OXYGENATION/RESPIRATORY FUNCTION  Goal: Patient will maintain patent airway  8/20/2020 2143 by Yousif Duke RN  Outcome: Ongoing     Problem: OXYGENATION/RESPIRATORY FUNCTION  Goal: Patient will achieve/maintain normal respiratory rate/effort  Description: Respiratory rate and effort will be within normal limits for the patient  8/20/2020 2143 by Yousif Duke RN  Outcome: Ongoing     Problem: MECHANICAL VENTILATION  Goal: Patient will maintain patent airway  8/20/2020 2143 by Yousif Duke RN  Outcome: Ongoing     Problem: MECHANICAL VENTILATION  Goal: Oral health is maintained or improved  8/20/2020 2143 by Yousif Duke RN  Outcome: Ongoing     Problem: MECHANICAL VENTILATION  Goal: ET tube will be managed safely  8/20/2020 2143 by Yousif Duke RN  Outcome: Ongoing     Problem: MECHANICAL VENTILATION  Goal: Ability to express needs and understand communication  8/20/2020 2143 by Yousif Duke RN  Outcome: Ongoing     Problem: MECHANICAL VENTILATION  Goal: Mobility/activity is maintained at optimum level for patient  8/20/2020 2143 by Yousif Duke RN  Outcome: Ongoing     Problem: SKIN INTEGRITY  Goal: Skin integrity is maintained or improved  8/20/2020 2143 by Yousif Duke RN  Outcome: Ongoing     Problem: NUTRITION  Goal: Nutritional status is improving  8/20/2020 2143 by Yousif Duke RN  Outcome: Ongoing     Problem: Skin Integrity:  Goal: Will show no infection signs and symptoms  Description: Will show no infection signs and symptoms  Outcome: Ongoing     Problem: Skin Integrity:  Goal: Absence of new skin breakdown  Description: Absence of new skin breakdown  Outcome: Ongoing     Problem: Falls - Risk of:  Goal: Will remain free from falls  Description: Will remain free from falls  Outcome: Ongoing     Problem: Falls - Risk of:  Goal: Absence of physical injury  Description: Absence of physical injury  Outcome: Ongoing Problem: Restraint Use - Nonviolent/Non-Self-Destructive Behavior:  Goal: Absence of restraint-related injury  Description: Absence of restraint-related injury  Outcome: Ongoing     Problem: Nutrition  Goal: Optimal nutrition therapy  Description: Nutrition Problem #1: Inadequate oral intake  Intervention: Food and/or Nutrient Delivery: Start Tube Feeding  Nutritional Goals: provide greater than 75% of nutrition needs     Outcome: Ongoing     Problem: Restraint Use - Nonviolent/Non-Self-Destructive Behavior:  Goal: Absence of restraint indications  Description: Absence of restraint indications  Outcome: Not Met This Shift

## 2020-08-21 NOTE — PROGRESS NOTES
Comprehensive Nutrition Assessment    Type and Reason for Visit:  Reassess    Nutrition Recommendations/Plan: Continue Vital AF at 60 ml per hour (1728 kcal, 108 g protein)    Nutrition Assessment:  Vent continues. Pt is tolerating tube feed at goal. Constipation continues, pt is on colace, nursing is aware.     Malnutrition Assessment:  Malnutrition Status:  Insufficient data    Context:  Acute Illness       Estimated Daily Nutrient Needs:  Energy (kcal):  9471-7375 kcal/day; Weight Used for Energy Requirements:  Current     Protein (g):   g pro/day; Weight Used for Protein Requirements:  Current(1.2-1.5)          Nutrition Related Findings:  20 ml residual      Current Nutrition Therapies:    Current Tube Feeding (TF) Orders:  · Feeding Route:    · Formula: Semi-Elemental  · Schedule: Continuous  · Additives/Modulars:    · Water Flushes:    · Current TF & Flush Orders Provides: see below  · Goal TF & Flush Orders Provides: Vital AF at 60 ml per hour provides 1728 kcal, 108 g protein      Anthropometric Measures:  · Height: 5' 4\" (162.6 cm)  · Current Body Weight: 147 lb (66.7 kg)   · Admission Body Weight:      · Usual Body Weight:       · Ideal Body Weight: 130 lbs; % Ideal Body Weight     · BMI: 25.2  · Adjusted Body Weight:  ;     · Adjusted BMI:      · BMI Categories: Normal Weight (BMI 22.0 to 24.9) age over 72       Nutrition Diagnosis:   · Inadequate oral intake related to impaired respiratory funtion as evidenced by NPO or clear liquid status due to medical condition, nutrition support - enteral nutrition    Nutrition Interventions:   Food and/or Nutrient Delivery:  Continue Current Tube Feeding  Nutrition Education/Counseling:  Education not indicated   Coordination of Nutrition Care:  Continued Inpatient Monitoring    Goals:  provide greater than 75% of nutrition needs       Nutrition Monitoring and Evaluation:   Food/Nutrient Intake Outcomes:  Enteral Nutrition Intake/Tolerance  Physical

## 2020-08-21 NOTE — PROGRESS NOTES
Mother called for an update. All questions were answered at this time. Will continue to monitor and update as needed.

## 2020-08-21 NOTE — PROGRESS NOTES
08/21/20 0950   Vent Information   Vent Mode CPAP   Pressure Support 6 cmH20   PEEP/CPAP 5     0950 wean trial started. [phone] ended wean trial due to apnea.

## 2020-08-21 NOTE — PROGRESS NOTES
08/21/20 0840 08/21/20 0842   Vent Information   Vent Mode CPAP PRVC   Pressure Support 8 cmH20  --    PEEP/CPAP 5  --      [id number] wean trial attempted but failed due to apnea.

## 2020-08-21 NOTE — PROGRESS NOTES
Critical Care Team - Daily Progress Note      Date and time: 8/21/2020 8:49 AM  Patient's name:  Lidia Shell  Medical Record Number: [id number]  Patient's account/billing number: [de-identified]  Patient's YOB: 1985  Age: 38 y.o. Date of Admission: 8/14/2020  5:18 AM  Length of stay during current admission: 7      Primary Care Physician: No primary care provider on file. ICU Attending Physician: Dr. Young Flow     Code Status: Full Code    Reason for ICU admission: Acute hypoxic respiratory failure secondary to recreational drug abuse    SUBJECTIVE:     OVERNIGHT EVENTS:     Patient is irritated and irritated was actively pulling out tubes. He was given 10 mg olanzepine IM and 1 mg Ativan. Received 1 more dose of 5 mg of olanzapine IM this morning at 4. Patient is on  Versed , Precedex and increased dose of Seroquel to 75 mg twice daily and fentanyl 200 mcg. Current Evaluation :     Patient is really sedated intubated and he is on mechanical ventilation,   Ventilation status : Intubated  Ventilatory setting :    Mode : PRVC:   PEEP 5  , Fio2 30, RR 15, Min Vent  490 , Spo2 99  Secretions : Secretions  Sedation: Versed, Precedex, Seroquel 75 mg twice daily, fentanyl 200 mcg    Review of Systems   Unable to perform ROS: Intubated     Vitals labile with blood pressure of 103/77 off pressors. Central Lines : Left internal jugular, intact with no leakage. Sibley placed , Urine output of 2.6 L    Diet : Tube feedings. Tube Feeds : OG/NS with rate of 150 to meet the goal with no residuals. Fluids : Total Intake of 3.559 mL, Total output of 2.6 L with a net positive of 939 ml in  last 24 hrs. Pt receiving IV normal saline 50 mL/h  Total net of 2.764 since admission. Significant Labs :EKG incomplete right bundle branch block, alkaline phosphatase 168 ALT 90 AST 59 QT of 410  Culture no growth in the last 5 days.     Active Meds: Quitiapine  75 mg twice daily, dexmedetomidine at 400 mcg, fentanyl 200 mcg IV, midazolam 1 mg IV, Ativan 1 mg every 4 hours as needed    Symptomatic Treatment: Docusate 100 mg.     AWAKE & FOLLOWING COMMANDS:  [x] No   [] Yes    CURRENT VENTILATION STATUS:     [x] Ventilator  [] BIPAP  [] Nasal Cannula [] Room Air      IF INTUBATED, ET TUBE MARKING AT LOWER LIP:       cms    SECRETIONS Amount:  [] Small [] Moderate  [x] Large  [] None  Color:     [] White [] Colored  [] Bloody    SEDATION:  RAAS Score:  [] Propofol gtt  [] Versed gtt  [] Ativan gtt   [] No Sedation    PARALYZED:  [] No    [] Yes    DIARRHEA:                [] No                [] Yes  (C. Difficile status: [] positive                                                                                                                       [] negative                                                                                                                     [] pending)    VASOPRESSORS:  [] No    [] Yes    If yes -   [] Levophed       [] Dopamine     [] Vasopressin       [] Dobutamine  [] Phenylephrine         [] Epinephrine    CENTRAL LINES:     [] No   [] Yes   (Date of Insertion:   )           If yes -     [] Right IJ     [] Left IJ [] Right Femoral [] Left Femoral                   [] Right Subclavian [] Left Subclavian       SALAMANCA'S CATHETER:   [] No   [] Yes  (Date of Insertion:   )     URINE OUTPUT:            [] Good   [] Low              [] Anuric      OBJECTIVE:     VITAL SIGNS:  /77   Pulse 58   Temp 98.6 °F (37 °C) (Oral)   Resp 15   Ht 5' 4\" (1.626 m)   Wt 147 lb 11.3 oz (67 kg)   SpO2 99%   BMI 25.35 kg/m²   Tmax over 24 hours:  Temp (24hrs), Av.3 °F (36.8 °C), Min:97.9 °F (36.6 °C), Max:98.6 °F (37 °C)      Patient Vitals for the past 8 hrs:   BP Temp Temp src Pulse Resp SpO2 Weight   20 0835 -- -- -- 58 15 99 % --   20 0800 103/77 -- -- 59 15 99 % --   20 0700 102/73 -- -- 61 15 99 % --   20 0630 101/75 -- -- 63 15 99 % --   20 0600 100/72 -- -- 64 15 99 % --   08/21/20 0530 105/73 -- -- 67 15 99 % --   08/21/20 0500 105/72 -- -- 68 15 99 % --   08/21/20 0430 103/71 -- -- 70 15 98 % --   08/21/20 0400 (!) 138/91 98.6 °F (37 °C) Oral 92 18 100 % --   08/21/20 0330 130/78 -- -- 91 17 99 % --   08/21/20 0314 -- -- -- 87 26 99 % --   08/21/20 0300 123/79 98.6 °F (37 °C) -- 79 14 100 % 147 lb 11.3 oz (67 kg)   08/21/20 0230 112/73 -- -- 66 16 99 % --   08/21/20 0200 113/68 -- -- 65 16 99 % --   08/21/20 0130 107/60 -- -- 66 15 98 % --   08/21/20 0100 (!) 140/86 -- -- 93 16 100 % --         Intake/Output Summary (Last 24 hours) at 8/21/2020 0849  Last data filed at 8/21/2020 0600  Gross per 24 hour   Intake 2885.52 ml   Output 2620 ml   Net 265.52 ml     Date 08/21/20 0000 - 08/21/20 2359   Shift 3428-8890 4504-2102 2622-9608 24 Hour Total   INTAKE   I.V.(mL/kg) 307. 3(4.6)   307. 3(4.6)   NG/GT(mL/kg) 243(3.6)   243(3.6)   Shift Total(mL/kg) 550.3(8.2)   550.3(8.2)   OUTPUT   Urine(mL/kg/hr) 765(1.4)   765   Shift Total(mL/kg) 765(11.4)   765(11.4)   Weight (kg) 67 67 67 67     Wt Readings from Last 3 Encounters:   08/21/20 147 lb 11.3 oz (67 kg)   08/13/20 150 lb (68 kg)   08/10/19 126 lb 15.8 oz (57.6 kg)     Body mass index is 25.35 kg/m². PHYSICAL EXAM:  Constitutional: Patient still intubated sedated and mechanically ventilated. bearly able to respond to my commands today  EENT: PERRLA, EOMI, sclera clear, anicteric, oropharynx clear, no lesions, neck supple with midline trachea. Neck: Supple, symmetrical, trachea midline, no adenopathy, left internal jugular catheter in place. Respiratory: clear to auscultation with bilateral vesicular, no wheezes or rales and unlabored breathing. No intercostal tenderness  Cardiovascular: regular rate and rhythm, normal S1, S2, no murmur noted and 2+ pulses throughout  Abdomen: soft, nontender, nondistended, no masses or organomegaly  Neurologic: Sedated  and awaiting he barely follows my commands.   Sensory and motor

## 2020-08-22 LAB
ABSOLUTE EOS #: 0.44 K/UL (ref 0–0.44)
ABSOLUTE IMMATURE GRANULOCYTE: 0.11 K/UL (ref 0–0.3)
ABSOLUTE LYMPH #: 2.04 K/UL (ref 1.1–3.7)
ABSOLUTE MONO #: 0.72 K/UL (ref 0.1–1.2)
ALBUMIN SERPL-MCNC: 3.1 G/DL (ref 3.5–5.2)
ALBUMIN/GLOBULIN RATIO: 0.9 (ref 1–2.5)
ALP BLD-CCNC: 190 U/L (ref 40–129)
ALT SERPL-CCNC: 74 U/L (ref 5–41)
ANION GAP SERPL CALCULATED.3IONS-SCNC: 10 MMOL/L (ref 9–17)
AST SERPL-CCNC: 57 U/L
BASOPHILS # BLD: 0 % (ref 0–2)
BASOPHILS ABSOLUTE: <0.03 K/UL (ref 0–0.2)
BILIRUB SERPL-MCNC: 0.33 MG/DL (ref 0.3–1.2)
BUN BLDV-MCNC: 14 MG/DL (ref 6–20)
BUN/CREAT BLD: ABNORMAL (ref 9–20)
CALCIUM IONIZED: 1.16 MMOL/L (ref 1.13–1.33)
CALCIUM SERPL-MCNC: 8.9 MG/DL (ref 8.6–10.4)
CHLORIDE BLD-SCNC: 101 MMOL/L (ref 98–107)
CO2: 26 MMOL/L (ref 20–31)
CREAT SERPL-MCNC: 0.6 MG/DL (ref 0.7–1.2)
CULTURE: NORMAL
CULTURE: NORMAL
DIFFERENTIAL TYPE: ABNORMAL
EKG ATRIAL RATE: 64 BPM
EKG P AXIS: 45 DEGREES
EKG P-R INTERVAL: 156 MS
EKG Q-T INTERVAL: 408 MS
EKG QRS DURATION: 96 MS
EKG QTC CALCULATION (BAZETT): 420 MS
EKG R AXIS: -17 DEGREES
EKG T AXIS: -25 DEGREES
EKG VENTRICULAR RATE: 64 BPM
EOSINOPHILS RELATIVE PERCENT: 6 % (ref 1–4)
GFR AFRICAN AMERICAN: >60 ML/MIN
GFR NON-AFRICAN AMERICAN: >60 ML/MIN
GFR SERPL CREATININE-BSD FRML MDRD: ABNORMAL ML/MIN/{1.73_M2}
GFR SERPL CREATININE-BSD FRML MDRD: ABNORMAL ML/MIN/{1.73_M2}
GLUCOSE BLD-MCNC: 111 MG/DL (ref 70–99)
HCT VFR BLD CALC: 35.5 % (ref 40.7–50.3)
HEMOGLOBIN: 12.2 G/DL (ref 13–17)
IMMATURE GRANULOCYTES: 2 %
LYMPHOCYTES # BLD: 27 % (ref 24–43)
Lab: NORMAL
Lab: NORMAL
MAGNESIUM: 2.2 MG/DL (ref 1.6–2.6)
MCH RBC QN AUTO: 32.5 PG (ref 25.2–33.5)
MCHC RBC AUTO-ENTMCNC: 34.4 G/DL (ref 28.4–34.8)
MCV RBC AUTO: 94.7 FL (ref 82.6–102.9)
MONOCYTES # BLD: 10 % (ref 3–12)
NRBC AUTOMATED: 0 PER 100 WBC
PDW BLD-RTO: 13 % (ref 11.8–14.4)
PHOSPHORUS: 4.6 MG/DL (ref 2.5–4.5)
PLATELET # BLD: 428 K/UL (ref 138–453)
PLATELET ESTIMATE: ABNORMAL
PMV BLD AUTO: 9.9 FL (ref 8.1–13.5)
POTASSIUM SERPL-SCNC: 4.4 MMOL/L (ref 3.7–5.3)
RBC # BLD: 3.75 M/UL (ref 4.21–5.77)
RBC # BLD: ABNORMAL 10*6/UL
SEG NEUTROPHILS: 55 % (ref 36–65)
SEGMENTED NEUTROPHILS ABSOLUTE COUNT: 4.2 K/UL (ref 1.5–8.1)
SODIUM BLD-SCNC: 137 MMOL/L (ref 135–144)
SPECIMEN DESCRIPTION: NORMAL
SPECIMEN DESCRIPTION: NORMAL
TOTAL CK: 759 U/L (ref 39–308)
TOTAL PROTEIN: 6.5 G/DL (ref 6.4–8.3)
TRIGL SERPL-MCNC: 208 MG/DL
WBC # BLD: 7.5 K/UL (ref 3.5–11.3)
WBC # BLD: ABNORMAL 10*3/UL

## 2020-08-22 PROCEDURE — 80053 COMPREHEN METABOLIC PANEL: CPT

## 2020-08-22 PROCEDURE — 2500000003 HC RX 250 WO HCPCS: Performed by: INTERNAL MEDICINE

## 2020-08-22 PROCEDURE — 2580000003 HC RX 258: Performed by: STUDENT IN AN ORGANIZED HEALTH CARE EDUCATION/TRAINING PROGRAM

## 2020-08-22 PROCEDURE — 82550 ASSAY OF CK (CPK): CPT

## 2020-08-22 PROCEDURE — 6370000000 HC RX 637 (ALT 250 FOR IP): Performed by: STUDENT IN AN ORGANIZED HEALTH CARE EDUCATION/TRAINING PROGRAM

## 2020-08-22 PROCEDURE — 6360000002 HC RX W HCPCS: Performed by: STUDENT IN AN ORGANIZED HEALTH CARE EDUCATION/TRAINING PROGRAM

## 2020-08-22 PROCEDURE — 2000000000 HC ICU R&B

## 2020-08-22 PROCEDURE — 83735 ASSAY OF MAGNESIUM: CPT

## 2020-08-22 PROCEDURE — 84100 ASSAY OF PHOSPHORUS: CPT

## 2020-08-22 PROCEDURE — 36415 COLL VENOUS BLD VENIPUNCTURE: CPT

## 2020-08-22 PROCEDURE — 93010 ELECTROCARDIOGRAM REPORT: CPT | Performed by: INTERNAL MEDICINE

## 2020-08-22 PROCEDURE — 2700000000 HC OXYGEN THERAPY PER DAY

## 2020-08-22 PROCEDURE — 2500000003 HC RX 250 WO HCPCS: Performed by: STUDENT IN AN ORGANIZED HEALTH CARE EDUCATION/TRAINING PROGRAM

## 2020-08-22 PROCEDURE — 85025 COMPLETE CBC W/AUTO DIFF WBC: CPT

## 2020-08-22 PROCEDURE — 94770 HC ETCO2 MONITOR DAILY: CPT

## 2020-08-22 PROCEDURE — 82330 ASSAY OF CALCIUM: CPT

## 2020-08-22 PROCEDURE — 94003 VENT MGMT INPAT SUBQ DAY: CPT

## 2020-08-22 PROCEDURE — 94761 N-INVAS EAR/PLS OXIMETRY MLT: CPT

## 2020-08-22 PROCEDURE — 93005 ELECTROCARDIOGRAM TRACING: CPT | Performed by: STUDENT IN AN ORGANIZED HEALTH CARE EDUCATION/TRAINING PROGRAM

## 2020-08-22 PROCEDURE — 84478 ASSAY OF TRIGLYCERIDES: CPT

## 2020-08-22 PROCEDURE — 99291 CRITICAL CARE FIRST HOUR: CPT | Performed by: INTERNAL MEDICINE

## 2020-08-22 RX ORDER — QUETIAPINE FUMARATE 25 MG/1
50 TABLET, FILM COATED ORAL ONCE
Status: COMPLETED | OUTPATIENT
Start: 2020-08-22 | End: 2020-08-22

## 2020-08-22 RX ADMIN — SODIUM CHLORIDE, PRESERVATIVE FREE 10 ML: 5 INJECTION INTRAVENOUS at 20:16

## 2020-08-22 RX ADMIN — QUETIAPINE FUMARATE 150 MG: 100 TABLET ORAL at 20:16

## 2020-08-22 RX ADMIN — PROPOFOL 15 MCG/KG/MIN: 10 INJECTION, EMULSION INTRAVENOUS at 03:22

## 2020-08-22 RX ADMIN — DEXMEDETOMIDINE HYDROCHLORIDE 1.4 MCG/KG/HR: 400 INJECTION INTRAVENOUS at 15:36

## 2020-08-22 RX ADMIN — QUETIAPINE FUMARATE 50 MG: 25 TABLET ORAL at 12:34

## 2020-08-22 RX ADMIN — DEXMEDETOMIDINE HYDROCHLORIDE 1.4 MCG/KG/HR: 400 INJECTION INTRAVENOUS at 01:50

## 2020-08-22 RX ADMIN — ENOXAPARIN SODIUM 40 MG: 40 INJECTION SUBCUTANEOUS at 08:59

## 2020-08-22 RX ADMIN — SODIUM CHLORIDE, PRESERVATIVE FREE 10 ML: 5 INJECTION INTRAVENOUS at 08:54

## 2020-08-22 RX ADMIN — Medication 75 MCG/HR: at 22:33

## 2020-08-22 RX ADMIN — SODIUM CHLORIDE: 9 INJECTION, SOLUTION INTRAVENOUS at 03:57

## 2020-08-22 RX ADMIN — Medication 10 MG/HR: at 18:21

## 2020-08-22 RX ADMIN — FAMOTIDINE 20 MG: 10 INJECTION, SOLUTION INTRAVENOUS at 09:00

## 2020-08-22 RX ADMIN — Medication 100 MG: at 09:01

## 2020-08-22 RX ADMIN — Medication 10 MG/HR: at 00:26

## 2020-08-22 RX ADMIN — Medication 10 MG/HR: at 09:27

## 2020-08-22 RX ADMIN — Medication 175 MCG/HR: at 02:17

## 2020-08-22 RX ADMIN — Medication 75 MCG/HR: at 09:55

## 2020-08-22 RX ADMIN — DEXMEDETOMIDINE HYDROCHLORIDE 1.4 MCG/KG/HR: 400 INJECTION INTRAVENOUS at 06:36

## 2020-08-22 RX ADMIN — PROPOFOL 15 MCG/KG/MIN: 10 INJECTION, EMULSION INTRAVENOUS at 17:36

## 2020-08-22 RX ADMIN — DEXMEDETOMIDINE HYDROCHLORIDE 1.4 MCG/KG/HR: 400 INJECTION INTRAVENOUS at 10:54

## 2020-08-22 RX ADMIN — DEXMEDETOMIDINE HYDROCHLORIDE 1.4 MCG/KG/HR: 400 INJECTION INTRAVENOUS at 20:33

## 2020-08-22 RX ADMIN — Medication 100 MG: at 20:16

## 2020-08-22 RX ADMIN — SODIUM CHLORIDE, PRESERVATIVE FREE 10 ML: 5 INJECTION INTRAVENOUS at 08:58

## 2020-08-22 RX ADMIN — QUETIAPINE FUMARATE 100 MG: 100 TABLET ORAL at 09:01

## 2020-08-22 RX ADMIN — ONDANSETRON 4 MG: 2 INJECTION INTRAMUSCULAR; INTRAVENOUS at 22:33

## 2020-08-22 RX ADMIN — FAMOTIDINE 20 MG: 10 INJECTION, SOLUTION INTRAVENOUS at 20:16

## 2020-08-22 ASSESSMENT — PULMONARY FUNCTION TESTS
PIF_VALUE: 17
PIF_VALUE: 19
PIF_VALUE: 17
PIF_VALUE: 20
PIF_VALUE: 16

## 2020-08-22 NOTE — PLAN OF CARE
Problem: OXYGENATION/RESPIRATORY FUNCTION  Goal: Patient will maintain patent airway  8/21/2020 2037 by Adalberto Emmanuel, RCP  Outcome: Ongoing  8/21/2020 2027 by Yousif Duke RN  Outcome: Ongoing  8/21/2020 1840 by Shauna Martinez RN  Outcome: Ongoing  8/21/2020 0654 by Verlean Sicard, RCP  Outcome: Ongoing  Goal: Patient will achieve/maintain normal respiratory rate/effort  Description: Respiratory rate and effort will be within normal limits for the patient  8/21/2020 2037 by Adalberto Emmanuel, RCP  Outcome: Ongoing  8/21/2020 2027 by Yousif Duke RN  Outcome: Ongoing  8/21/2020 0654 by Verlean Sicard, P  Outcome: Ongoing     Problem: OXYGENATION/RESPIRATORY FUNCTION  Goal: Patient will achieve/maintain normal respiratory rate/effort  Description: Respiratory rate and effort will be within normal limits for the patient  8/21/2020 2037 by Adalberto Emmanuel, RCP  Outcome: Ongoing  8/21/2020 2027 by Yousif Duke RN  Outcome: Ongoing  8/21/2020 0654 by Verlean Sicard, RCP  Outcome: Ongoing     Problem: MECHANICAL VENTILATION  Goal: Patient will maintain patent airway  8/21/2020 2037 by Adalberto Emmanuel, RCP  Outcome: Ongoing  8/21/2020 2027 by Yousif Duke RN  Outcome: Ongoing  8/21/2020 0654 by Verlean Sicard, RCP  Outcome: Ongoing  Goal: Oral health is maintained or improved  8/21/2020 2037 by Adalberto Emmanuel, RCP  Outcome: Ongoing  8/21/2020 2027 by Yousif Duke RN  Outcome: Ongoing  8/21/2020 0654 by Verlean Sicard, MARLY  Outcome: Ongoing  Goal: ET tube will be managed safely  8/21/2020 2037 by Adalberto Emmanuel, RCP  Outcome: Ongoing  8/21/2020 2027 by Yousif Duke RN  Outcome: Ongoing  8/21/2020 0654 by Verlean Sicard, RCP  Outcome: Ongoing  Goal: Ability to express needs and understand communication  8/21/2020 2037 by Adalberto Emmanuel, NADIAP  Outcome: Ongoing  8/21/2020 2027 by Yousif Duke RN  Outcome: Ongoing  8/21/2020 0654 by Verlean Sicard, RCP  Outcome: Ongoing  Goal: Mobility/activity is maintained at optimum level for patient  8/21/2020 2037 by Esperanza Lackey RCP  Outcome: Ongoing  8/21/2020 2027 by Carrie Gabriel RN  Outcome: Ongoing  8/21/2020 0654 by Rk Crawford RCP  Outcome: Ongoing     Problem: SKIN INTEGRITY  Goal: Skin integrity is maintained or improved  8/21/2020 2037 by Esperanza Lackey RCP  Outcome: Ongoing  8/21/2020 2027 by Carrie Gabriel RN  Outcome: Ongoing  8/21/2020 0654 by Rk Crawford RCP  Outcome: Ongoing

## 2020-08-22 NOTE — PROGRESS NOTES
[] pending)    VASOPRESSORS:  [x] No    [] Yes    If yes -   [] Levophed       [] Dopamine     [] Vasopressin       [] Dobutamine  [] Phenylephrine         [] Epinephrine    CENTRAL LINES:     [] No   [] Yes   (Date of Insertion:   )           If yes -     [] Right IJ     [] Left IJ [] Right Femoral [] Left Femoral                   [] Right Subclavian [] Left Subclavian       SALAMANCA'S CATHETER:   [] No   [x] Yes  (Date of Insertion:   )     URINE OUTPUT:            [x] Good   [] Low              [] Anuric      OBJECTIVE:     VITAL SIGNS:  BP 84/61   Pulse 65   Temp 98.2 °F (36.8 °C) (Oral)   Resp 14   Ht 5' 4\" (1.626 m)   Wt 145 lb 8.1 oz (66 kg)   SpO2 99%   BMI 24.98 kg/m²   Tmax over 24 hours:  Temp (24hrs), Av.1 °F (36.7 °C), Min:97.5 °F (36.4 °C), Max:98.8 °F (37.1 °C)      Patient Vitals for the past 8 hrs:   BP Temp Temp src Pulse Resp SpO2 Weight   20 0800 84/61 98.2 °F (36.8 °C) Oral 65 14 99 % --   20 0756 -- -- -- 65 15 99 % --   20 0730 89/64 -- -- 65 16 99 % --   20 0700 (!) 86/55 -- -- 65 18 99 % --   20 0630 80/68 -- -- 62 15 100 % --   20 0600 91/65 -- -- 60 15 100 % --   20 0530 84/60 -- -- 61 15 100 % --   20 0500 87/62 -- -- 61 15 100 % --   20 0430 90/64 -- -- 63 15 100 % --   20 0400 93/68 97.5 °F (36.4 °C) Oral 58 15 100 % --   20 0347 -- 97.5 °F (36.4 °C) -- 58 16 100 % --   20 0330 91/67 -- -- 58 15 100 % --   20 0300 (!) 85/58 -- -- 59 15 100 % 145 lb 8.1 oz (66 kg)   20 0230 91/61 -- -- 57 15 100 % --   20 0200 (!) 84/59 -- -- 59 15 100 % --   20 0130 87/64 -- -- 57 15 100 % --         Intake/Output Summary (Last 24 hours) at 2020 0906  Last data filed at 2020 0828  Gross per 24 hour   Intake 3426.16 ml   Output 1055 ml   Net 2371.16 ml     Date 20 0000 - 20 2359   Shift 2023-0063 1699-3352 2366-9489 24 Hour Total   INTAKE   I.V.(mL/kg) 753.4(11.4) 437(6.6)  1190.4(18)   NG/GT(mL/kg) 344(5.2) 234(3.5)  578(8.8)   Shift Total(mL/kg) 1097.4(16.6) 671(10.2)  1768.4(26.8)   OUTPUT   Urine(mL/kg/hr) 120(0.2) 75  195   Shift Total(mL/kg) 120(1.8) 75(1.1)  195(3)   Weight (kg) 66 66 66 66     Wt Readings from Last 3 Encounters:   08/22/20 145 lb 8.1 oz (66 kg)   08/13/20 150 lb (68 kg)   08/10/19 126 lb 15.8 oz (57.6 kg)     Body mass index is 24.98 kg/m². PHYSICAL EXAM:  Constitutional: Patient still intubated sedated and mechanically ventilated. bearly able to respond to my commands today  EENT: PERRLA, EOMI,   Respiratory: clear to auscultation with bilateral vesicular, no wheezes or rales and unlabored breathing. No intercostal tenderness  Cardiovascular: regular rate and rhythm, normal S1, S2, no murmur noted and 2+ pulses throughout  Abdomen: soft, nontender, nondistended, no masses or organomegaly  Neurologic: Sedated  and awaiting he barely follows my commands.   Sensory and motor examinations are grossly intact   Extremities:  peripheral pulses normal, no pedal edema, no clubbing or cyanosis    MEDICATIONS:  Scheduled Meds:   QUEtiapine  100 mg Oral BID    enoxaparin  40 mg Subcutaneous Daily    chlorhexidine  15 mL Mouth/Throat BID    famotidine (PEPCID) injection  20 mg Intravenous BID    sodium chloride flush  10 mL Intravenous 2 times per day    sodium chloride flush  10 mL Intravenous 2 times per day    docusate  100 mg Per NG tube BID     Continuous Infusions:   propofol 10 mcg/kg/min (08/22/20 0844)    dexmedetomidine 1.4 mcg/kg/hr (08/22/20 0636)    sodium chloride 5 mL/hr at 08/22/20 0848    fentaNYL 100 mcg/hr (08/22/20 0613)    midazolam 10 mg/hr (08/22/20 0026)     PRN Meds:   LORazepam, 1 mg, Q4H PRN  haloperidol lactate, 5 mg, Q2H PRN  albuterol, 2.5 mg, As Directed RT PRN  sodium chloride flush, 10 mL, PRN  sodium chloride flush, 10 mL, PRN  acetaminophen, 650 mg, Q6H PRN    Or  acetaminophen, 650 mg, Q6H admission:     Blood cultures:                 [] None drawn      [] Negative             [x]  Positive (Details:  )  Urine Culture:                   [] None drawn      [] Negative             []  Positive (Details:  )  Sputum Culture:               [] None drawn       [] Negative             []  Positive (Details:  )   Endotracheal aspirate:     [] None drawn       [] Negative             []  Positive (Details:  )     Gram-negative Serratia positive blood cultures found during this admission       ASSESSMENT:   Active Problems:    Acute respiratory failure with hypoxia ([facility] [address]    Sepsis due to Gram-negative organism with septic shock (HCC)    Intravenous drug abuse ([facility] [address]    Toxic metabolic encephalopathy  Resolved Problems:    * No resolved hospital problems. *        PLAN:     1. Acute hypoxic respiratory failure secondary to recreational drug abuse. --Continue the patient on mechanical ventilation. --Remains on propofol, fentanyl, Versed and Precedex drip. --Continue Seroquel 100mg twice daily  --Continue weaning sedation as tolerated  --RT evaluation    2. Serratia septicemia: Resolved  --Completed the course of IV Zosyn. --No growth in blood cultures since last 5 days. --IV normal saline 50 mL/h      PT/OT :    DVT Prophylaxis: Enoxaparin 40 mg SC  Stress Ulcer prophylaxis : Famotidine 20 mg IV twice daily    Disposition: Patient stays in the ICU. Edwar Young M.D.              Department of Internal Medicine/ Critical care  [facility] ([place]             8/22/2020, 9:06 AM

## 2020-08-23 LAB
ABSOLUTE EOS #: 0.45 K/UL (ref 0–0.44)
ABSOLUTE IMMATURE GRANULOCYTE: 0.08 K/UL (ref 0–0.3)
ABSOLUTE LYMPH #: 1.99 K/UL (ref 1.1–3.7)
ABSOLUTE MONO #: 0.58 K/UL (ref 0.1–1.2)
ALBUMIN SERPL-MCNC: 2.7 G/DL (ref 3.5–5.2)
ALBUMIN/GLOBULIN RATIO: 0.7 (ref 1–2.5)
ALP BLD-CCNC: 241 U/L (ref 40–129)
ALT SERPL-CCNC: 70 U/L (ref 5–41)
ANION GAP SERPL CALCULATED.3IONS-SCNC: 12 MMOL/L (ref 9–17)
AST SERPL-CCNC: 42 U/L
BASOPHILS # BLD: 0 % (ref 0–2)
BASOPHILS ABSOLUTE: <0.03 K/UL (ref 0–0.2)
BILIRUB SERPL-MCNC: 0.27 MG/DL (ref 0.3–1.2)
BUN BLDV-MCNC: 17 MG/DL (ref 6–20)
BUN/CREAT BLD: ABNORMAL (ref 9–20)
CALCIUM IONIZED: 1.13 MMOL/L (ref 1.13–1.33)
CALCIUM SERPL-MCNC: 8.7 MG/DL (ref 8.6–10.4)
CHLORIDE BLD-SCNC: 102 MMOL/L (ref 98–107)
CO2: 24 MMOL/L (ref 20–31)
CREAT SERPL-MCNC: 0.67 MG/DL (ref 0.7–1.2)
DIFFERENTIAL TYPE: ABNORMAL
EOSINOPHILS RELATIVE PERCENT: 5 % (ref 1–4)
GFR AFRICAN AMERICAN: >60 ML/MIN
GFR NON-AFRICAN AMERICAN: >60 ML/MIN
GFR SERPL CREATININE-BSD FRML MDRD: ABNORMAL ML/MIN/{1.73_M2}
GFR SERPL CREATININE-BSD FRML MDRD: ABNORMAL ML/MIN/{1.73_M2}
GLUCOSE BLD-MCNC: 93 MG/DL (ref 70–99)
HCT VFR BLD CALC: 36.8 % (ref 40.7–50.3)
HEMOGLOBIN: 12 G/DL (ref 13–17)
IMMATURE GRANULOCYTES: 1 %
LYMPHOCYTES # BLD: 24 % (ref 24–43)
MAGNESIUM: 2.4 MG/DL (ref 1.6–2.6)
MCH RBC QN AUTO: 32.2 PG (ref 25.2–33.5)
MCHC RBC AUTO-ENTMCNC: 32.6 G/DL (ref 28.4–34.8)
MCV RBC AUTO: 98.7 FL (ref 82.6–102.9)
MONOCYTES # BLD: 7 % (ref 3–12)
NRBC AUTOMATED: 0 PER 100 WBC
PDW BLD-RTO: 13.2 % (ref 11.8–14.4)
PHOSPHORUS: 4.7 MG/DL (ref 2.5–4.5)
PLATELET # BLD: 548 K/UL (ref 138–453)
PLATELET ESTIMATE: ABNORMAL
PMV BLD AUTO: 10.2 FL (ref 8.1–13.5)
POTASSIUM SERPL-SCNC: 4.4 MMOL/L (ref 3.7–5.3)
RBC # BLD: 3.73 M/UL (ref 4.21–5.77)
RBC # BLD: ABNORMAL 10*6/UL
SEG NEUTROPHILS: 63 % (ref 36–65)
SEGMENTED NEUTROPHILS ABSOLUTE COUNT: 5.35 K/UL (ref 1.5–8.1)
SODIUM BLD-SCNC: 138 MMOL/L (ref 135–144)
TOTAL CK: 545 U/L (ref 39–308)
TOTAL PROTEIN: 6.5 G/DL (ref 6.4–8.3)
WBC # BLD: 8.5 K/UL (ref 3.5–11.3)
WBC # BLD: ABNORMAL 10*3/UL

## 2020-08-23 PROCEDURE — 2000000000 HC ICU R&B

## 2020-08-23 PROCEDURE — 80053 COMPREHEN METABOLIC PANEL: CPT

## 2020-08-23 PROCEDURE — 2580000003 HC RX 258: Performed by: STUDENT IN AN ORGANIZED HEALTH CARE EDUCATION/TRAINING PROGRAM

## 2020-08-23 PROCEDURE — 82550 ASSAY OF CK (CPK): CPT

## 2020-08-23 PROCEDURE — 82330 ASSAY OF CALCIUM: CPT

## 2020-08-23 PROCEDURE — 83735 ASSAY OF MAGNESIUM: CPT

## 2020-08-23 PROCEDURE — 36415 COLL VENOUS BLD VENIPUNCTURE: CPT

## 2020-08-23 PROCEDURE — 85025 COMPLETE CBC W/AUTO DIFF WBC: CPT

## 2020-08-23 PROCEDURE — 6370000000 HC RX 637 (ALT 250 FOR IP): Performed by: STUDENT IN AN ORGANIZED HEALTH CARE EDUCATION/TRAINING PROGRAM

## 2020-08-23 PROCEDURE — 84100 ASSAY OF PHOSPHORUS: CPT

## 2020-08-23 PROCEDURE — 6370000000 HC RX 637 (ALT 250 FOR IP): Performed by: INTERNAL MEDICINE

## 2020-08-23 PROCEDURE — 99291 CRITICAL CARE FIRST HOUR: CPT | Performed by: INTERNAL MEDICINE

## 2020-08-23 PROCEDURE — 6360000002 HC RX W HCPCS: Performed by: STUDENT IN AN ORGANIZED HEALTH CARE EDUCATION/TRAINING PROGRAM

## 2020-08-23 PROCEDURE — 94003 VENT MGMT INPAT SUBQ DAY: CPT

## 2020-08-23 PROCEDURE — 2500000003 HC RX 250 WO HCPCS: Performed by: STUDENT IN AN ORGANIZED HEALTH CARE EDUCATION/TRAINING PROGRAM

## 2020-08-23 PROCEDURE — 93005 ELECTROCARDIOGRAM TRACING: CPT | Performed by: STUDENT IN AN ORGANIZED HEALTH CARE EDUCATION/TRAINING PROGRAM

## 2020-08-23 PROCEDURE — 2500000003 HC RX 250 WO HCPCS: Performed by: INTERNAL MEDICINE

## 2020-08-23 RX ADMIN — Medication 100 MG: at 20:03

## 2020-08-23 RX ADMIN — DEXMEDETOMIDINE HYDROCHLORIDE 1.4 MCG/KG/HR: 400 INJECTION INTRAVENOUS at 15:02

## 2020-08-23 RX ADMIN — SODIUM CHLORIDE, PRESERVATIVE FREE 10 ML: 5 INJECTION INTRAVENOUS at 20:02

## 2020-08-23 RX ADMIN — FAMOTIDINE 20 MG: 10 INJECTION, SOLUTION INTRAVENOUS at 08:33

## 2020-08-23 RX ADMIN — DEXMEDETOMIDINE HYDROCHLORIDE 1.4 MCG/KG/HR: 400 INJECTION INTRAVENOUS at 19:52

## 2020-08-23 RX ADMIN — QUETIAPINE FUMARATE 250 MG: 100 TABLET ORAL at 20:03

## 2020-08-23 RX ADMIN — Medication 10 MG/HR: at 12:55

## 2020-08-23 RX ADMIN — FAMOTIDINE 20 MG: 10 INJECTION, SOLUTION INTRAVENOUS at 20:03

## 2020-08-23 RX ADMIN — Medication 10 MG/HR: at 03:24

## 2020-08-23 RX ADMIN — Medication 75 MCG/HR: at 11:44

## 2020-08-23 RX ADMIN — DEXMEDETOMIDINE HYDROCHLORIDE 1.4 MCG/KG/HR: 400 INJECTION INTRAVENOUS at 23:41

## 2020-08-23 RX ADMIN — DEXMEDETOMIDINE HYDROCHLORIDE 1.4 MCG/KG/HR: 400 INJECTION INTRAVENOUS at 01:22

## 2020-08-23 RX ADMIN — CHLORHEXIDINE GLUCONATE 15 ML: 1.2 RINSE ORAL at 20:03

## 2020-08-23 RX ADMIN — ENOXAPARIN SODIUM 40 MG: 40 INJECTION SUBCUTANEOUS at 08:32

## 2020-08-23 RX ADMIN — Medication 100 MG: at 08:35

## 2020-08-23 RX ADMIN — LORAZEPAM 1 MG: 2 INJECTION INTRAMUSCULAR; INTRAVENOUS at 18:20

## 2020-08-23 RX ADMIN — PROPOFOL 15 MCG/KG/MIN: 10 INJECTION, EMULSION INTRAVENOUS at 07:10

## 2020-08-23 RX ADMIN — DEXMEDETOMIDINE HYDROCHLORIDE 1.4 MCG/KG/HR: 400 INJECTION INTRAVENOUS at 10:18

## 2020-08-23 RX ADMIN — SODIUM CHLORIDE, PRESERVATIVE FREE 10 ML: 5 INJECTION INTRAVENOUS at 08:30

## 2020-08-23 RX ADMIN — DEXMEDETOMIDINE HYDROCHLORIDE 1.4 MCG/KG/HR: 400 INJECTION INTRAVENOUS at 05:49

## 2020-08-23 RX ADMIN — QUETIAPINE FUMARATE 250 MG: 100 TABLET ORAL at 08:35

## 2020-08-23 ASSESSMENT — PULMONARY FUNCTION TESTS
PIF_VALUE: 19
PIF_VALUE: 18
PIF_VALUE: 19
PIF_VALUE: 20
PIF_VALUE: 20
PIF_VALUE: 19
PIF_VALUE: 19

## 2020-08-23 NOTE — PLAN OF CARE
Problem: OXYGENATION/RESPIRATORY FUNCTION  Goal: Patient will maintain patent airway  8/23/2020 0402 by Faith Espino RCP  Outcome: Ongoing  8/22/2020 2111 by Sylvia Mariee RN  Outcome: Ongoing  Goal: Patient will achieve/maintain normal respiratory rate/effort  Description: Respiratory rate and effort will be within normal limits for the patient  8/23/2020 0402 by Faith Espino RCP  Outcome: Ongoing  8/22/2020 2111 by Sylvia Mariee RN  Outcome: Ongoing     Problem: MECHANICAL VENTILATION  Goal: Patient will maintain patent airway  8/23/2020 0402 by Faith Espino RCP  Outcome: Ongoing  8/22/2020 2111 by Sylvia Mariee RN  Outcome: Ongoing  Goal: Oral health is maintained or improved  8/23/2020 0402 by Faith Espino RCP  Outcome: Ongoing  8/22/2020 2111 by Sylvia Mariee RN  Outcome: Ongoing  Goal: ET tube will be managed safely  8/23/2020 0402 by Faith Espino RCP  Outcome: Ongoing  8/22/2020 2111 by Sylvia Mariee RN  Outcome: Ongoing  Goal: Ability to express needs and understand communication  8/23/2020 0402 by Faith Espino RCP  Outcome: Ongoing  8/22/2020 2111 by Sylvia Mariee RN  Outcome: Ongoing  Goal: Mobility/activity is maintained at optimum level for patient  8/23/2020 0402 by Faith Espino RCP  Outcome: Ongoing  8/22/2020 2111 by Sylvia Mariee RN  Outcome: Ongoing

## 2020-08-23 NOTE — PLAN OF CARE
Problem: OXYGENATION/RESPIRATORY FUNCTION  Goal: Patient will maintain patent airway  8/22/2020 2111 by Mike Rivera RN  Outcome: Ongoing     Problem: OXYGENATION/RESPIRATORY FUNCTION  Goal: Patient will achieve/maintain normal respiratory rate/effort  Description: Respiratory rate and effort will be within normal limits for the patient  8/22/2020 2111 by Mike Rivera RN  Outcome: Ongoing     Problem: MECHANICAL VENTILATION  Goal: Patient will maintain patent airway  8/22/2020 2111 by Mike Rivera RN  Outcome: Ongoing     Problem: MECHANICAL VENTILATION  Goal: Oral health is maintained or improved  8/22/2020 2111 by Mike Rivera RN  Outcome: Ongoing     Problem: MECHANICAL VENTILATION  Goal: ET tube will be managed safely  8/22/2020 2111 by Mike Rivera RN  Outcome: Ongoing     Problem: MECHANICAL VENTILATION  Goal: Ability to express needs and understand communication  8/22/2020 2111 by Mike Rivera RN  Outcome: Ongoing     Problem: MECHANICAL VENTILATION  Goal: Mobility/activity is maintained at optimum level for patient  8/22/2020 2111 by Mike Rivera RN  Outcome: Ongoing     Problem: SKIN INTEGRITY  Goal: Skin integrity is maintained or improved  8/22/2020 2111 by Mike Rivera RN  Outcome: Ongoing     Problem: NUTRITION  Goal: Nutritional status is improving  8/22/2020 2111 by Mike Rivera RN  Outcome: Ongoing     Problem: Skin Integrity:  Goal: Will show no infection signs and symptoms  Description: Will show no infection signs and symptoms  8/22/2020 2111 by Mike Rivera RN  Outcome: Ongoing     Problem: Skin Integrity:  Goal: Absence of new skin breakdown  Description: Absence of new skin breakdown  Outcome: Ongoing     Problem: Falls - Risk of:  Goal: Will remain free from falls  Description: Will remain free from falls  8/22/2020 2111 by Mike Rivera RN  Outcome: Ongoing     Problem: Falls - Risk of:  Goal: Absence of physical injury  Description: Absence of physical injury  Outcome: Ongoing     Problem: Restraint Use - Nonviolent/Non-Self-Destructive Behavior:  Goal: Absence of restraint-related injury  Description: Absence of restraint-related injury  8/22/2020 2111 by Carrie Gabriel RN  Outcome: Ongoing     Problem: Nutrition  Goal: Optimal nutrition therapy  Description: Nutrition Problem #1: Inadequate oral intake  Intervention: Food and/or Nutrient Delivery: Start Tube Feeding  Nutritional Goals: provide greater than 75% of nutrition needs     8/22/2020 2111 by Carrie Gabriel RN  Outcome: Ongoing     Problem: Restraint Use - Nonviolent/Non-Self-Destructive Behavior:  Goal: Absence of restraint indications  Description: Absence of restraint indications  8/22/2020 2111 by Carrie Gabriel RN  Outcome: Not Met This Shift

## 2020-08-23 NOTE — PLAN OF CARE
Problem: SKIN INTEGRITY  Goal: Skin integrity is maintained or improved  8/23/2020 1741 by Kyle Jensen RN  Outcome: Ongoing     Problem: NUTRITION  Goal: Nutritional status is improving  Outcome: Ongoing     Problem: Skin Integrity:  Goal: Will show no infection signs and symptoms  Description: Will show no infection signs and symptoms  Outcome: Ongoing     Problem: Falls - Risk of:  Goal: Will remain free from falls  Description: Will remain free from falls  Outcome: Ongoing     Problem: Restraint Use - Nonviolent/Non-Self-Destructive Behavior:  Goal: Absence of restraint-related injury  Description: Absence of restraint-related injury  Outcome: Ongoing     Problem: Nutrition  Goal: Optimal nutrition therapy  Description: Nutrition Problem #1: Inadequate oral intake  Intervention: Food and/or Nutrient Delivery: Start Tube Feeding  Nutritional Goals: provide greater than 75% of nutrition needs     Outcome: Ongoing     Problem: Restraint Use - Nonviolent/Non-Self-Destructive Behavior:  Goal: Absence of restraint indications  Description: Absence of restraint indications  Outcome: Not Met This Shift  Note: Patient remained in restraints entire shift.

## 2020-08-23 NOTE — PROGRESS NOTES
Critical Care Team - Daily Progress Note      Date and time: 8/23/2020 8:06 AM  Patient's name:  Varghese Rosales Record Number: [id number]  Patient's account/billing number: [de-identified]  Patient's YOB: 1985  Age: 38 y.o. Date of Admission: 8/14/2020  5:18 AM  Length of stay during current admission: 9      Primary Care Physician: No primary care provider on file. ICU Attending Physician: Dr. Emre Levy     Code Status: Full Code    Reason for ICU admission: Acute hypoxic respiratory failure secondary to recreational drug abuse    SUBJECTIVE:     OVERNIGHT EVENTS:       Maricel agitated and combative with brief episodes of each. Unsafe to extubate. Currently on propofol fentanyl PCA Versed drip and Precedex. Afebrile hemodynamically stable not requiring pressor support. Patient's primary concern is difficulty with extubation as he is extremely agitated with underlying psych issues. Lab work grossly unremarkable. Will de-escalate. Net negative fluid balance. No Sibley. Cont TF. Will inc seroquel to 250mg qhs. Current Evaluation :     Review of Systems unable to determine as the patient is heavily sedated and unresponsive.         AWAKE & FOLLOWING COMMANDS:  [x] No   [] Yes    CURRENT VENTILATION STATUS:     [x] Ventilator  [] BIPAP  [] Nasal Cannula [] Room Air      IF INTUBATED, ET TUBE MARKING AT LOWER LIP:       cms    SECRETIONS Amount:  [x] Small [] Moderate  [] Large  [] None  Color:     [] White [x] Colored  [] Bloody    SEDATION:  RAAS Score:  [x] Propofol gtt  [x] Versed gtt  [] Ativan gtt   [] No Sedation    PARALYZED:  [x] No    [] Yes    DIARRHEA:                [x] No                [] Yes  (C. Difficile status: [] positive                                                                                                                       [] negative                                                                                                                     [] pending)    VASOPRESSORS:  [x] No    [] Yes    If yes -   [] Levophed       [] Dopamine     [] Vasopressin       [] Dobutamine  [] Phenylephrine         [] Epinephrine    CENTRAL LINES:     [] No   [] Yes   (Date of Insertion:   )           If yes -     [] Right IJ     [] Left IJ [] Right Femoral [] Left Femoral                   [] Right Subclavian [] Left Subclavian       SALAMANCA'S CATHETER:   [] No   [x] Yes  (Date of Insertion:   )     URINE OUTPUT:            [x] Good   [] Low              [] Anuric      OBJECTIVE:     VITAL SIGNS:  BP 99/67   Pulse 67   Temp 98.2 °F (36.8 °C) (Oral)   Resp 18   Ht 5' 4\" (1.626 m)   Wt 148 lb 9.4 oz (67.4 kg)   SpO2 99%   BMI 25.51 kg/m²   Tmax over 24 hours:  Temp (24hrs), Av.1 °F (36.7 °C), Min:98 °F (36.7 °C), Max:98.2 °F (36.8 °C)      Patient Vitals for the past 8 hrs:   BP Temp Temp src Pulse Resp SpO2   20 0754 -- -- -- 67 18 99 %   20 0700 99/67 -- -- 63 18 100 %   20 0630 (!) 93/56 -- -- 65 17 100 %   20 0600 96/64 -- -- 65 17 100 %   20 0530 97/62 -- -- 66 17 100 %   20 0500 (!) /56 -- -- 65 16 100 %   20 0430 (!) 96/59 -- -- 71 20 100 %   20 0400 94/60 98.2 °F (36.8 °C) Oral 65 17 99 %   20 0330 93/63 -- -- 65 19 99 %   20 0319 -- -- -- 65 15 100 %   20 0300 (!) 91/58 -- -- 65 15 100 %   20 0230 (!) 88/54 -- -- 67 15 100 %   20 0200 (!) 92/59 -- -- 67 15 100 %   20 0130  -- -- 69 16 99 %   20 0100 (!) /56 -- -- 70 21 99 %   20 0030  -- -- 70 16 97 %   20 0010 -- -- -- -- 17 --         Intake/Output Summary (Last 24 hours) at 2020 0806  Last data filed at 2020 0549  Gross per 24 hour   Intake 2690.05 ml   Output 2574 ml   Net 116.05 ml     Date 20 0000 - 20 2359   Shift 0272-7647 7378-4323 4873-5931 24 Hour Total   INTAKE   I.V.(mL/kg) 361.8(5.4)   361.8(5.4)   NG/GT(mL/kg) 414(6.1)   414(6.1)   Shift Total(mL/kg) 775.8(11.5)   775. 8(11.5)   OUTPUT   Urine(mL/kg/hr) 834(1.5)   834   Shift Total(mL/kg) 834(12.4)   834(12.4)   Weight (kg) 67.4 67.4 67.4 67.4     Wt Readings from Last 3 Encounters:   08/23/20 148 lb 9.4 oz (67.4 kg)   08/13/20 150 lb (68 kg)   08/10/19 126 lb 15.8 oz (57.6 kg)     Body mass index is 25.51 kg/m². PHYSICAL EXAM:  Constitutional: Patient still intubated sedated and mechanically ventilated. bearly able to respond to my commands today  EENT: PERRLA, EOMI,   Respiratory: clear to auscultation with bilateral vesicular, no wheezes or rales and unlabored breathing. No intercostal tenderness  Cardiovascular: regular rate and rhythm, normal S1, S2, no murmur noted and 2+ pulses throughout  Abdomen: soft, nontender, nondistended, no masses or organomegaly  Neurologic: Sedated  and awaiting he barely follows my commands.   Sensory and motor examinations are grossly intact   Extremities:  peripheral pulses normal, no pedal edema, no clubbing or cyanosis    MEDICATIONS:  Scheduled Meds:   QUEtiapine  150 mg Oral BID    enoxaparin  40 mg Subcutaneous Daily    chlorhexidine  15 mL Mouth/Throat BID    famotidine (PEPCID) injection  20 mg Intravenous BID    sodium chloride flush  10 mL Intravenous 2 times per day    sodium chloride flush  10 mL Intravenous 2 times per day    docusate  100 mg Per NG tube BID     Continuous Infusions:   propofol 15 mcg/kg/min (08/23/20 0710)    dexmedetomidine 1.4 mcg/kg/hr (08/23/20 0549)    sodium chloride 5 mL/hr at 08/22/20 0848    fentaNYL 75 mcg/hr (08/22/20 2233)    midazolam 10 mg/hr (08/23/20 0324)     PRN Meds:   LORazepam, 1 mg, Q4H PRN  haloperidol lactate, 5 mg, Q2H PRN  albuterol, 2.5 mg, As Directed RT PRN  sodium chloride flush, 10 mL, PRN  sodium chloride flush, 10 mL, PRN  acetaminophen, 650 mg, Q6H PRN    Or  acetaminophen, 650 mg, Q6H PRN  polyethylene glycol, 17 g, Daily PRN  promethazine, 12.5 mg, Q6H PRN    Or  ondansetron, 4 mg, Q6H PRN  artificial tears, , PRN        SUPPORT DEVICES: [x] Ventilator [] BIPAP  [] Nasal Cannula [] Room Air    VENT SETTINGS (Comprehensive) (if applicable):   PRVC mode, FiO2 99%, PEEP 5, Respiratory Rate 15, Tidal Volume 490, fio2 30  Vent Information  $Ventilation: $Subsequent Day  Skin Assessment: Clean, dry, & intact  Suction Catheter Diameter: 14  Equipment ID: [id number]  Equipment Changed: HME  Vent Type: Servo i  Vent Mode: PRVC  Vt Ordered: 490 mL  Rate Set: 15 bmp  Pressure Support: 6 cmH20  FiO2 : 30 %  SpO2: 99 %  SpO2/FiO2 ratio: 330  Sensitivity: 4  PEEP/CPAP: 5  I Time/ I Time %: 0.9 s  Humidification Source: HME  Nitric Oxide/Epoprostenol In Use?: No  Additional Respiratory  Assessments  Pulse: 67  Resp: 18  SpO2: 99 %  End Tidal CO2: 33 (%)  Position: Semi-Omer's  Humidification Source: HME  Oral Care Completed?: Yes  Oral Care: Mouth swabbed, Mouth moisturizer, Mouth suctioned  Subglottic Suction Done?: Yes  Cuff Pressure (cm H2O): 18 cm H2O  Skin barrier applied: No    ABGs:     Lab Results   Component Value Date    PHART 7.218 08/14/2020    UUA2JZM 56.3 08/14/2020    PO2ART 82.3 08/14/2020    SHW9DIC 22.9 08/14/2020    ZOI1QNZ 29 08/19/2020    S0QEBWXN 93.1 08/14/2020    FIO2 30.0 08/19/2020     Lactic Acid:   Lab Results   Component Value Date    LACTA NOT REPORTED 08/15/2020    LACTA NOT REPORTED 08/15/2020    LACTA NOT REPORTED 08/15/2020         DATA:  Complete Blood Count:   Recent Labs     08/21/20  0354 08/22/20  0634 08/23/20  0526   WBC 8.2 7.5 8.5   HGB 12.4* 12.2* 12.0*   MCV 95.0 94.7 98.7    428 548*   RBC 3.99* 3.75* 3.73*   HCT 37.9* 35.5* 36.8*   MCH 31.1 32.5 32.2   MCHC 32.7 34.4 32.6   RDW 12.4 13.0 13.2   MPV 9.8 9.9 10.2        PT/INR:    Lab Results   Component Value Date    PROTIME 14.2 08/13/2020    INR 1.1 08/13/2020     PTT:    Lab Results   Component Value Date    APTT 53.2 08/15/2020       Basal Metabolic Profile:   Recent Labs     08/21/20  0354 08/22/20  2804 08/23/20  0526    137 138   K 3.9 4.4 4.4   BUN 13 14 17   CREATININE 0.66* 0.60* 0.67*   CL 98 101 102   CO2 23 26 24      Magnesium:   Lab Results   Component Value Date    MG 2.4 08/23/2020    MG 2.2 08/22/2020    MG 2.1 08/21/2020     Phosphorus:   Lab Results   Component Value Date    PHOS 4.7 08/23/2020    PHOS 4.6 08/22/2020    PHOS 3.5 08/21/2020     S. Calcium:  Recent Labs     08/23/20  0526   CALCIUM 8.7     S. Ionized Calcium:No results for input(s): IONCA in the last 72 hours. Urinalysis:   Lab Results   Component Value Date    NITRU NEGATIVE 08/14/2020    COLORU YELLOW 08/14/2020    PHUR 6.0 08/14/2020    WBCUA 0 TO 2 08/14/2020    RBCUA 2 TO 5 08/14/2020    MUCUS NOT REPORTED 08/14/2020    TRICHOMONAS NOT REPORTED 08/14/2020    YEAST NOT REPORTED 08/14/2020    BACTERIA NOT REPORTED 08/14/2020    SPECGRAV 1.014 08/14/2020    LEUKOCYTESUR NEGATIVE 08/14/2020    UROBILINOGEN Normal 08/14/2020    BILIRUBINUR NEGATIVE 08/14/2020    GLUCOSEU 2+ 08/14/2020    KETUA NEGATIVE 08/14/2020    AMORPHOUS 1+ 08/14/2020       CARDIAC ENZYMES: No results for input(s): CKMB, CKMBINDEX, TROPONINI in the last 72 hours. Invalid input(s): CKTOTAL;3  BNP: No results for input(s): BNP in the last 72 hours. LFTS  Recent Labs     08/21/20  0354 08/22/20  0634 08/23/20  0526   ALKPHOS 168* 190* 241*   ALT 90* 74* 70*   AST 59* 57* 42*   BILITOT 0.57 0.33 0.27*   LABALBU 3.0* 3.1* 2.7*       AMYLASE/LIPASE/AMMONIA  No results for input(s): AMYLASE, LIPASE, AMMONIA in the last 72 hours. Last 3 Blood Glucose:   Recent Labs     08/21/20  0354 08/22/20  0634 08/23/20  0526   GLUCOSE 117* 111* 93      HgBA1c:  No results found for: LABA1C      TSH:  No results found for: TSH  ANEMIA STUDIES  No results for input(s): LABIRON, TIBC, FERRITIN, [id number], FOLATE, OCCULTBLD in the last 72 hours.     Cultures during this admission:     Blood cultures:                 [] None drawn      [] Negative             [x] Positive (Details:  )  Urine Culture:                   [] None drawn      [] Negative             []  Positive (Details:  )  Sputum Culture:               [] None drawn       [] Negative             []  Positive (Details:  )   Endotracheal aspirate:     [] None drawn       [] Negative             []  Positive (Details:  )     Gram-negative Serratia positive blood cultures found during this admission       ASSESSMENT:   Active Problems:    Acute respiratory failure with hypoxia ([facility] [address]    Sepsis due to Gram-negative organism with septic shock (HCC)    Intravenous drug abuse ([facility] [address]    Toxic metabolic encephalopathy  Resolved Problems:    * No resolved hospital problems. *        PLAN:     Maricel agitated and combative with brief episodes of each. Unsafe to extubate. Currently on propofol fentanyl PCA Versed drip and Precedex. Afebrile hemodynamically stable not requiring pressor support. Patient's primary concern is difficulty with extubation as he is extremely agitated with underlying psych issues. Lab work grossly unremarkable. Will de-escalate. Net negative fluid balance. No Sibley. Cont TF. Will inc seroquel to 250mg bid. 1.  Acute hypoxic respiratory failure secondary to recreational drug abuse. --Continue the patient on mechanical ventilation. --Remains on propofol, fentanyl, Versed and Precedex drip. --Continue Seroquel 250mg twice daily  --Continue weaning sedation as tolerated  --RT evaluation    2. Serratia septicemia: Resolved  --Completed the course of IV Zosyn. --No growth in blood cultures since last 5 days. --IV normal saline 50 mL/h      PT/OT :    DVT Prophylaxis: Enoxaparin 40 mg SC  Stress Ulcer prophylaxis : Famotidine 20 mg IV twice daily    Disposition: Patient stays in the ICU. Rodo Jung M.D.              Department of Internal Medicine/ Critical care  [id number]")             8/23/2020, 8:06 AM

## 2020-08-24 ENCOUNTER — APPOINTMENT (OUTPATIENT)
Dept: GENERAL RADIOLOGY | Age: 35
DRG: 812 | End: 2020-08-24
Attending: INTERNAL MEDICINE
Payer: MEDICAID

## 2020-08-24 LAB
ABSOLUTE EOS #: 0.31 K/UL (ref 0–0.44)
ABSOLUTE IMMATURE GRANULOCYTE: 0.08 K/UL (ref 0–0.3)
ABSOLUTE LYMPH #: 1.73 K/UL (ref 1.1–3.7)
ABSOLUTE MONO #: 0.8 K/UL (ref 0.1–1.2)
ALBUMIN SERPL-MCNC: 2.9 G/DL (ref 3.5–5.2)
ALBUMIN/GLOBULIN RATIO: 0.7 (ref 1–2.5)
ALP BLD-CCNC: 450 U/L (ref 40–129)
ALT SERPL-CCNC: 133 U/L (ref 5–41)
ANION GAP SERPL CALCULATED.3IONS-SCNC: 14 MMOL/L (ref 9–17)
AST SERPL-CCNC: 134 U/L
BASOPHILS # BLD: 1 % (ref 0–2)
BASOPHILS ABSOLUTE: 0.05 K/UL (ref 0–0.2)
BILIRUB SERPL-MCNC: 0.3 MG/DL (ref 0.3–1.2)
BUN BLDV-MCNC: 21 MG/DL (ref 6–20)
BUN/CREAT BLD: ABNORMAL (ref 9–20)
CALCIUM IONIZED: 1.17 MMOL/L (ref 1.13–1.33)
CALCIUM SERPL-MCNC: 9.2 MG/DL (ref 8.6–10.4)
CHLORIDE BLD-SCNC: 103 MMOL/L (ref 98–107)
CO2: 23 MMOL/L (ref 20–31)
CREAT SERPL-MCNC: 0.75 MG/DL (ref 0.7–1.2)
DIFFERENTIAL TYPE: ABNORMAL
EKG ATRIAL RATE: 62 BPM
EKG ATRIAL RATE: 72 BPM
EKG P AXIS: 57 DEGREES
EKG P AXIS: 71 DEGREES
EKG P-R INTERVAL: 144 MS
EKG P-R INTERVAL: 152 MS
EKG Q-T INTERVAL: 390 MS
EKG Q-T INTERVAL: 414 MS
EKG QRS DURATION: 100 MS
EKG QRS DURATION: 98 MS
EKG QTC CALCULATION (BAZETT): 420 MS
EKG QTC CALCULATION (BAZETT): 427 MS
EKG R AXIS: -20 DEGREES
EKG R AXIS: -22 DEGREES
EKG T AXIS: -24 DEGREES
EKG T AXIS: -34 DEGREES
EKG VENTRICULAR RATE: 62 BPM
EKG VENTRICULAR RATE: 72 BPM
EOSINOPHILS RELATIVE PERCENT: 3 % (ref 1–4)
GFR AFRICAN AMERICAN: >60 ML/MIN
GFR NON-AFRICAN AMERICAN: >60 ML/MIN
GFR SERPL CREATININE-BSD FRML MDRD: ABNORMAL ML/MIN/{1.73_M2}
GFR SERPL CREATININE-BSD FRML MDRD: ABNORMAL ML/MIN/{1.73_M2}
GLUCOSE BLD-MCNC: 111 MG/DL (ref 70–99)
HCT VFR BLD CALC: 40 % (ref 40.7–50.3)
HEMOGLOBIN: 12.2 G/DL (ref 13–17)
IMMATURE GRANULOCYTES: 1 %
LYMPHOCYTES # BLD: 17 % (ref 24–43)
MAGNESIUM: 2.5 MG/DL (ref 1.6–2.6)
MCH RBC QN AUTO: 31.9 PG (ref 25.2–33.5)
MCHC RBC AUTO-ENTMCNC: 30.5 G/DL (ref 28.4–34.8)
MCV RBC AUTO: 104.4 FL (ref 82.6–102.9)
MONOCYTES # BLD: 8 % (ref 3–12)
NRBC AUTOMATED: 0 PER 100 WBC
PDW BLD-RTO: 13.5 % (ref 11.8–14.4)
PHOSPHORUS: 4.5 MG/DL (ref 2.5–4.5)
PLATELET # BLD: 664 K/UL (ref 138–453)
PLATELET ESTIMATE: ABNORMAL
PMV BLD AUTO: 10.2 FL (ref 8.1–13.5)
POTASSIUM SERPL-SCNC: 4.4 MMOL/L (ref 3.7–5.3)
RBC # BLD: 3.83 M/UL (ref 4.21–5.77)
RBC # BLD: ABNORMAL 10*6/UL
SEG NEUTROPHILS: 71 % (ref 36–65)
SEGMENTED NEUTROPHILS ABSOLUTE COUNT: 7.35 K/UL (ref 1.5–8.1)
SODIUM BLD-SCNC: 140 MMOL/L (ref 135–144)
TOTAL CK: 401 U/L (ref 39–308)
TOTAL PROTEIN: 6.9 G/DL (ref 6.4–8.3)
WBC # BLD: 10.3 K/UL (ref 3.5–11.3)
WBC # BLD: ABNORMAL 10*3/UL

## 2020-08-24 PROCEDURE — 87389 HIV-1 AG W/HIV-1&-2 AB AG IA: CPT

## 2020-08-24 PROCEDURE — 6360000002 HC RX W HCPCS: Performed by: STUDENT IN AN ORGANIZED HEALTH CARE EDUCATION/TRAINING PROGRAM

## 2020-08-24 PROCEDURE — 93010 ELECTROCARDIOGRAM REPORT: CPT | Performed by: INTERNAL MEDICINE

## 2020-08-24 PROCEDURE — 93005 ELECTROCARDIOGRAM TRACING: CPT | Performed by: STUDENT IN AN ORGANIZED HEALTH CARE EDUCATION/TRAINING PROGRAM

## 2020-08-24 PROCEDURE — 83735 ASSAY OF MAGNESIUM: CPT

## 2020-08-24 PROCEDURE — 97166 OT EVAL MOD COMPLEX 45 MIN: CPT

## 2020-08-24 PROCEDURE — 87591 N.GONORRHOEAE DNA AMP PROB: CPT

## 2020-08-24 PROCEDURE — 2500000003 HC RX 250 WO HCPCS: Performed by: STUDENT IN AN ORGANIZED HEALTH CARE EDUCATION/TRAINING PROGRAM

## 2020-08-24 PROCEDURE — 6370000000 HC RX 637 (ALT 250 FOR IP): Performed by: STUDENT IN AN ORGANIZED HEALTH CARE EDUCATION/TRAINING PROGRAM

## 2020-08-24 PROCEDURE — 87491 CHLMYD TRACH DNA AMP PROBE: CPT

## 2020-08-24 PROCEDURE — 97535 SELF CARE MNGMENT TRAINING: CPT

## 2020-08-24 PROCEDURE — 80053 COMPREHEN METABOLIC PANEL: CPT

## 2020-08-24 PROCEDURE — 36415 COLL VENOUS BLD VENIPUNCTURE: CPT

## 2020-08-24 PROCEDURE — 86780 TREPONEMA PALLIDUM: CPT

## 2020-08-24 PROCEDURE — 2500000003 HC RX 250 WO HCPCS: Performed by: INTERNAL MEDICINE

## 2020-08-24 PROCEDURE — 2000000000 HC ICU R&B

## 2020-08-24 PROCEDURE — 82330 ASSAY OF CALCIUM: CPT

## 2020-08-24 PROCEDURE — 94003 VENT MGMT INPAT SUBQ DAY: CPT

## 2020-08-24 PROCEDURE — 99291 CRITICAL CARE FIRST HOUR: CPT | Performed by: INTERNAL MEDICINE

## 2020-08-24 PROCEDURE — 82550 ASSAY OF CK (CPK): CPT

## 2020-08-24 PROCEDURE — 6370000000 HC RX 637 (ALT 250 FOR IP): Performed by: INTERNAL MEDICINE

## 2020-08-24 PROCEDURE — 85025 COMPLETE CBC W/AUTO DIFF WBC: CPT

## 2020-08-24 PROCEDURE — 84100 ASSAY OF PHOSPHORUS: CPT

## 2020-08-24 PROCEDURE — 2580000003 HC RX 258: Performed by: STUDENT IN AN ORGANIZED HEALTH CARE EDUCATION/TRAINING PROGRAM

## 2020-08-24 RX ORDER — LORAZEPAM 1 MG/1
1 TABLET ORAL
Status: DISCONTINUED | OUTPATIENT
Start: 2020-08-24 | End: 2020-08-26 | Stop reason: HOSPADM

## 2020-08-24 RX ORDER — GLYCOPYRROLATE 1 MG/5 ML
0.1 SYRINGE (ML) INTRAVENOUS ONCE
Status: DISCONTINUED | OUTPATIENT
Start: 2020-08-24 | End: 2020-08-24

## 2020-08-24 RX ORDER — NICOTINE 21 MG/24HR
1 PATCH, TRANSDERMAL 24 HOURS TRANSDERMAL DAILY
Status: DISCONTINUED | OUTPATIENT
Start: 2020-08-24 | End: 2020-08-26 | Stop reason: HOSPADM

## 2020-08-24 RX ORDER — GLYCOPYRROLATE 0.2 MG/ML
0.1 INJECTION INTRAMUSCULAR; INTRAVENOUS ONCE
Status: COMPLETED | OUTPATIENT
Start: 2020-08-24 | End: 2020-08-24

## 2020-08-24 RX ORDER — FENTANYL CITRATE 50 UG/ML
75 INJECTION, SOLUTION INTRAMUSCULAR; INTRAVENOUS
Status: DISCONTINUED | OUTPATIENT
Start: 2020-08-24 | End: 2020-08-26 | Stop reason: HOSPADM

## 2020-08-24 RX ORDER — POLYETHYLENE GLYCOL 3350 17 G/17G
17 POWDER, FOR SOLUTION ORAL DAILY
Status: DISCONTINUED | OUTPATIENT
Start: 2020-08-24 | End: 2020-08-25

## 2020-08-24 RX ORDER — FENTANYL CITRATE 50 UG/ML
50 INJECTION, SOLUTION INTRAMUSCULAR; INTRAVENOUS
Status: DISCONTINUED | OUTPATIENT
Start: 2020-08-24 | End: 2020-08-24

## 2020-08-24 RX ADMIN — LORAZEPAM 1 MG: 2 INJECTION INTRAMUSCULAR; INTRAVENOUS at 13:11

## 2020-08-24 RX ADMIN — FAMOTIDINE 20 MG: 10 INJECTION, SOLUTION INTRAVENOUS at 20:14

## 2020-08-24 RX ADMIN — LORAZEPAM 1 MG: 2 INJECTION INTRAMUSCULAR; INTRAVENOUS at 10:27

## 2020-08-24 RX ADMIN — FAMOTIDINE 20 MG: 10 INJECTION, SOLUTION INTRAVENOUS at 08:29

## 2020-08-24 RX ADMIN — FENTANYL CITRATE 75 MCG: 50 INJECTION, SOLUTION INTRAMUSCULAR; INTRAVENOUS at 17:44

## 2020-08-24 RX ADMIN — DEXMEDETOMIDINE HYDROCHLORIDE 1.4 MCG/KG/HR: 400 INJECTION INTRAVENOUS at 08:21

## 2020-08-24 RX ADMIN — PROMETHAZINE HYDROCHLORIDE 12.5 MG: 25 TABLET ORAL at 17:45

## 2020-08-24 RX ADMIN — SODIUM CHLORIDE, PRESERVATIVE FREE 10 ML: 5 INJECTION INTRAVENOUS at 20:00

## 2020-08-24 RX ADMIN — LORAZEPAM 1 MG: 1 TABLET ORAL at 17:45

## 2020-08-24 RX ADMIN — GLYCOPYRROLATE 0.1 MG: 0.2 INJECTION INTRAMUSCULAR; INTRAVENOUS at 11:01

## 2020-08-24 RX ADMIN — HALOPERIDOL LACTATE 5 MG: 5 INJECTION, SOLUTION INTRAMUSCULAR at 15:29

## 2020-08-24 RX ADMIN — QUETIAPINE FUMARATE 250 MG: 100 TABLET ORAL at 08:31

## 2020-08-24 RX ADMIN — FENTANYL CITRATE 50 MCG: 50 INJECTION, SOLUTION INTRAMUSCULAR; INTRAVENOUS at 10:35

## 2020-08-24 RX ADMIN — DEXMEDETOMIDINE HYDROCHLORIDE 1.4 MCG/KG/HR: 400 INJECTION INTRAVENOUS at 13:41

## 2020-08-24 RX ADMIN — FENTANYL CITRATE 50 MCG: 50 INJECTION, SOLUTION INTRAMUSCULAR; INTRAVENOUS at 13:42

## 2020-08-24 RX ADMIN — Medication 100 MG: at 08:38

## 2020-08-24 RX ADMIN — LORAZEPAM 1 MG: 2 INJECTION INTRAMUSCULAR; INTRAVENOUS at 04:10

## 2020-08-24 RX ADMIN — LORAZEPAM 1 MG: 2 INJECTION INTRAMUSCULAR; INTRAVENOUS at 16:45

## 2020-08-24 RX ADMIN — CHLORHEXIDINE GLUCONATE 15 ML: 1.2 RINSE ORAL at 08:43

## 2020-08-24 RX ADMIN — FENTANYL CITRATE 75 MCG: 50 INJECTION, SOLUTION INTRAMUSCULAR; INTRAVENOUS at 21:26

## 2020-08-24 RX ADMIN — BENZOCAINE AND MENTHOL 1 LOZENGE: 15; 3.6 LOZENGE ORAL at 13:11

## 2020-08-24 RX ADMIN — DEXMEDETOMIDINE HYDROCHLORIDE 1.4 MCG/KG/HR: 400 INJECTION INTRAVENOUS at 04:13

## 2020-08-24 RX ADMIN — QUETIAPINE FUMARATE 250 MG: 100 TABLET ORAL at 20:14

## 2020-08-24 RX ADMIN — Medication 9 MG/HR: at 00:03

## 2020-08-24 RX ADMIN — Medication 25 MCG/HR: at 01:40

## 2020-08-24 RX ADMIN — PROPOFOL 10 MCG/KG/MIN: 10 INJECTION, EMULSION INTRAVENOUS at 00:04

## 2020-08-24 RX ADMIN — ENOXAPARIN SODIUM 40 MG: 40 INJECTION SUBCUTANEOUS at 08:29

## 2020-08-24 ASSESSMENT — PAIN SCALES - GENERAL
PAINLEVEL_OUTOF10: 0
PAINLEVEL_OUTOF10: 4
PAINLEVEL_OUTOF10: 0
PAINLEVEL_OUTOF10: 0

## 2020-08-24 ASSESSMENT — PULMONARY FUNCTION TESTS
PIF_VALUE: 25
PIF_VALUE: 12
PIF_VALUE: 23
PIF_VALUE: 20

## 2020-08-24 NOTE — PROGRESS NOTES
Patient extubated with no complications. He attempted to get out of bed, but was able to be calmed down once he spoke to his mother on the phone.

## 2020-08-24 NOTE — PROGRESS NOTES
Mother stopped in to visit with the pt. She states concern about all of the sedation medication and what our plan is at this time. Writer reassured her that the sedation is for the pts safety and are working to take the sedation down as pt condition tolerates. Mother understands. All further questions were answered and will continue to monitor at this time.

## 2020-08-24 NOTE — PLAN OF CARE
Problem: OXYGENATION/RESPIRATORY FUNCTION  Goal: Patient will maintain patent airway  8/23/2020 2017 by Mando Bryson, RCP  Outcome: Ongoing  8/23/2020 1041 by Marycarmen Jones RCP  Outcome: Ongoing  Goal: Patient will achieve/maintain normal respiratory rate/effort  Description: Respiratory rate and effort will be within normal limits for the patient  8/23/2020 2017 by Mando Bryson, RCP  Outcome: Ongoing  8/23/2020 1041 by Marycarmen Jones RCP  Outcome: Ongoing     Problem: MECHANICAL VENTILATION  Goal: Patient will maintain patent airway  8/23/2020 2017 by Mando Bryson, RCP  Outcome: Ongoing  8/23/2020 1041 by Marycarmen Jones RCP  Outcome: Ongoing  Goal: Oral health is maintained or improved  8/23/2020 2017 by Mando Bryson, RCP  Outcome: Ongoing  8/23/2020 1041 by Marycarmen Jones RCP  Outcome: Ongoing  Goal: ET tube will be managed safely  8/23/2020 2017 by Mando Bryson, RCP  Outcome: Ongoing  8/23/2020 1041 by Marycarmen Jones RCP  Outcome: Ongoing  Goal: Ability to express needs and understand communication  8/23/2020 2017 by Mando Bryson, RCP  Outcome: Ongoing  8/23/2020 1041 by Marycarmen Jones RCP  Outcome: Ongoing  Goal: Mobility/activity is maintained at optimum level for patient  8/23/2020 2017 by Mando Bryson, RCP  Outcome: Ongoing  8/23/2020 1041 by Marycarmen Jones RCP  Outcome: Ongoing

## 2020-08-24 NOTE — PROGRESS NOTES
Comprehensive Nutrition Assessment    Type and Reason for Visit:  Reassess    Nutrition Recommendations/Plan: Continue clear liquids - advance diet as tolerated. Add ONS if/as needed. Nutrition Assessment:  Pt extubated today. Passed RN swallow study and tolerating clear liquids well per RN. No BM since admission noted. Malnutrition Assessment:  Malnutrition Status:  Insufficient data        Estimated Daily Nutrient Needs:  Energy (kcal):  25 kcal/kg = 7155-9731 kcals/day; Weight Used for Energy Requirements:  Current     Protein (g):  1.3 gm/kg = 75-80 gm/day; Weight Used for Protein Requirements:  Ideal          Nutrition Related Findings:  Constipation (+Glycolax, Colace)      Wounds:  None       Current Nutrition Therapies:    DIET CLEAR LIQUID; Anthropometric Measures:  · Height: 5' 4\" (162.6 cm)  · Current Body Weight: 147 lb (66.7 kg)   · Ideal Body Weight: 130 lbs; % Ideal Body Weight  113%   · BMI: 25.2  · BMI Categories: Overweight (BMI 25.0-29. 9)       Nutrition Diagnosis:   · Inadequate oral intake related to (recent extubation) as evidenced by (recent diet advancement, clear liquids)      Nutrition Interventions:   Food and/or Nutrient Delivery:  Continue Current Diet(Advance diet as tolerated. Add ONS if /as needed.)  Nutrition Education/Counseling:  Education not indicated   Coordination of Nutrition Care:  Continued Inpatient Monitoring    Goals:  provide greater than 75% of nutrition needs       Nutrition Monitoring and Evaluation:   Food/Nutrient Intake Outcomes:  Diet Advancement/Tolerance, Food and Nutrient Intake  Physical Signs/Symptoms Outcomes:  Weight, Skin, Biochemical Data, Nutrition Focused Physical Findings, Constipation     Discharge Planning:     Too soon to determine     Electronically signed by Serene Baugh, MS, RD, LD on 8/24/20 at 2:41 PM EDT    Contact: [phone]

## 2020-08-24 NOTE — PLAN OF CARE
Problem: OXYGENATION/RESPIRATORY FUNCTION  Goal: Patient will maintain patent airway  8/23/2020 2201 by Isi Jaime RN  Outcome: Ongoing     Problem: OXYGENATION/RESPIRATORY FUNCTION  Goal: Patient will achieve/maintain normal respiratory rate/effort  Description: Respiratory rate and effort will be within normal limits for the patient  8/23/2020 2201 by Isi Jaime RN  Outcome: Ongoing     Problem: MECHANICAL VENTILATION  Goal: Patient will maintain patent airway  8/23/2020 2201 by Isi Jaime RN  Outcome: Ongoing     Problem: MECHANICAL VENTILATION  Goal: Oral health is maintained or improved  8/23/2020 2201 by Isi Jaime RN  Outcome: Ongoing     Problem: MECHANICAL VENTILATION  Goal: ET tube will be managed safely  8/23/2020 2201 by Isi Jaime RN  Outcome: Ongoing     Problem: MECHANICAL VENTILATION  Goal: Ability to express needs and understand communication  8/23/2020 2201 by Isi Jaime RN  Outcome: Ongoing     Problem: MECHANICAL VENTILATION  Goal: Mobility/activity is maintained at optimum level for patient  8/23/2020 2201 by Isi Jaime RN  Outcome: Ongoing    Problem: MECHANICAL VENTILATION  Goal: Mobility/activity is maintained at optimum level for patient  8/23/2020 2201 by Isi Jaime RN  Outcome: Ongoing     Problem: SKIN INTEGRITY  Goal: Skin integrity is maintained or improved  8/23/2020 2201 by Isi Jaime RN  Outcome: Ongoing     Problem: NUTRITION  Goal: Nutritional status is improving  8/23/2020 2201 by Isi Jaime RN  Outcome: Ongoing     Problem: Skin Integrity:  Goal: Will show no infection signs and symptoms  Description: Will show no infection signs and symptoms  8/23/2020 2201 by Isi Jaime RN  Outcome: Ongoing     Problem: Skin Integrity:  Goal: Absence of new skin breakdown  Description: Absence of new skin breakdown  Outcome: Ongoing     Problem: Falls - Risk of:  Goal: Will remain free from falls  Description: Will remain free from falls  8/23/2020 2201 by Marta Pfeiffer RN  Outcome: Ongoing     Problem: Falls - Risk of:  Goal: Absence of physical injury  Description: Absence of physical injury  Outcome: Ongoing     Problem: Restraint Use - Nonviolent/Non-Self-Destructive Behavior:  Goal: Absence of restraint-related injury  Description: Absence of restraint-related injury  8/23/2020 2201 by Marta Pfeiffer RN  Outcome: Ongoing     Problem: Nutrition  Goal: Optimal nutrition therapy  Description: Nutrition Problem #1: Inadequate oral intake  Intervention: Food and/or Nutrient Delivery: Start Tube Feeding  Nutritional Goals: provide greater than 75% of nutrition needs     8/23/2020 2201 by Marta Pfeiffer RN  Outcome: Ongoing     Problem: Restraint Use - Nonviolent/Non-Self-Destructive Behavior:  Goal: Absence of restraint indications  Description: Absence of restraint indications  8/23/2020 2201 by Marta Pfeiffer RN  Outcome: Not Met This Shift

## 2020-08-24 NOTE — PROGRESS NOTES
Occupational Therapy   Occupational Therapy Initial Assessment  Date: 2020   Patient Name: Kaye Cespedes  MRN: [id number]     : 1985    Date of Service: 2020    Discharge Recommendations: Further therapy recommended at discharge. The patient should be able to tolerate at least three hours of therapy per day over 5 days or 15 hours over 7 days. OT Equipment Recommendations  Other: CTA    Assessment   Performance deficits / Impairments: Decreased functional mobility ; Decreased endurance;Decreased ADL status; Decreased safe awareness;Decreased high-level IADLs;Decreased fine motor control;Decreased coordination  Assessment: Pt would benefit from further skilled OT services to address decreased functional mobility, ADL performance, endurance, safety awareness, and fine motor control. Prognosis: Good  Decision Making: Medium Complexity  OT Education: OT Role;Plan of Care  REQUIRES OT FOLLOW UP: Yes  Activity Tolerance  Activity Tolerance: Patient limited by fatigue;Treatment limited secondary to decreased cognition  Activity Tolerance: Pt heavily medicated  Safety Devices  Safety Devices in place: Yes  Type of devices: All fall risk precautions in place; Left in bed;Bed alarm in place;Nurse notified;Call light within reach  Restraints  Initially in place: No           Patient Diagnosis(es): The encounter diagnosis was Acute respiratory failure with hypoxia ([facility] [address] has a past medical history of Bronchitis, Difficult intubation, Heartburn, Inguinal hernia, and Snores. has a past surgical history that includes Tonsillectomy; Inguinal hernia repair (Left, 2018); and pr lap,inguinal hernia repr,initial (Left, 2018).          Restrictions  Restrictions/Precautions  Restrictions/Precautions: General Precautions  Required Braces or Orthoses?: No  Position Activity Restriction  Other position/activity restrictions: Sitter in room    Subjective   General  Patient assessed for rehabilitation services?: Yes  Family / Caregiver Present: No  Diagnosis: Acute repiratory failure  General Comment  Comments: RN ok'd for OT eval. Pt lethargic and confused throughout session. Patient Currently in Pain: Denies  Pain Assessment  Pain Assessment: 0-10  Pain Level: 0  Non-Pharmaceutical Pain Intervention(s): Distraction; Ambulation/Increased Activity; Therapeutic presence  Response to Pain Intervention: Patient Satisfied  Vital Signs  Patient Currently in Pain: Denies  Height and Weight  Height: 5' 4\" (162.6 cm)     Social/Functional History  Social/Functional History  Lives With: Family(mom, dad, and others)  Type of Home: House  Home Layout: Two level, Able to Live on Main level with bedroom/bathroom, Performs ADL's on one level  Home Access: Stairs to enter with rails  Entrance Stairs - Number of Steps: 4  Entrance Stairs - Rails: Right  Bathroom Shower/Tub: Walk-in shower  Home Equipment: (No DME)  ADL Assistance: Independent  Homemaking Assistance: Independent  Homemaking Responsibilities: Yes  Meal Prep Responsibility: Primary  Laundry Responsibility: Primary  Cleaning Responsibility: Primary  Shopping Responsibility: Primary  Ambulation Assistance: Independent  Transfer Assistance: Independent  Active : Yes  Occupation: Full time employment  Type of occupation: [employer]  Additional Comments: Pt heavily sedated, pt reports \"being all doped up\", questionable historian at this time. Objective   Vision: Within Functional Limits  Hearing: Within functional limits    Orientation  Overall Orientation Status: Impaired  Orientation Level: Oriented to time;Oriented to place; Disoriented to situation;Oriented to person(pt reports being in hospital, however did not know which one. Pt oriented to month and year, and knew president. Pt not oriented to situation.)     Balance  Sitting Balance: Moderate assistance(consistent posterior R lean sitting EOB)  Standing Balance:  Moderate assistance  Standing Balance  Time: ~ 4 mins  Comment: Pt required consistent mod A while standing, with decreased balance throughout. Pt a fall risk. Functional Mobility  Functional - Mobility Device: Rolling Walker  Activity: Other  Assist Level: Moderate assistance  Functional Mobility Comments: Pt required mod A to take 3-4 L lateral steps towards HOB. Required VCs to lift feet off ground and to keep hands on RW while standing. Pt impulsive while standing and demo'd a posterior lean throughout. ADL  Feeding: Moderate assistance;Verbal cueing; Increased time to complete;Setup  Grooming: Moderate assistance;Verbal cueing; Increased time to complete;Setup  UE Bathing: Setup;Verbal cueing; Increased time to complete; Moderate assistance  LE Bathing: Setup;Verbal cueing; Increased time to complete; Moderate assistance  UE Dressing: Setup;Verbal cueing; Increased time to complete; Moderate assistance  LE Dressing: Setup;Verbal cueing; Increased time to complete; Moderate assistance  Toileting: Setup; Increased time to complete; Moderate assistance  Additional Comments: Pt demo'd impulsive behaviors throughout session, requiring max VCs for safety and participation throughout session. Pt heavily medicated at this time, reporting feeling \"all doped up\". Pt donned socks sitting EOB, requiring mod A for completion, d/t decreased [address] bilaterally, pt required VCs and increased time to hook thumb on opening of sock for donning over toes. Pt stood EOB with mod A for stability and safety, pt required verbal and physical cues to take 3-4 lateral steps towards [address] with max VCs for sequencing and to keep hands on RW. Pt stood for ~4 mins, marching in place and to take lateral steps to the L with mod A for balance.  Pt completed sponge bathing of LB, UB, and face in bed with mod A for wringing out of washcloths, sequencing of steps (getting washcloth wet, getting soap on washcloth, rubbing together, washing body, and drying off)-requiring max VCs and mod A to complete. Pt made inappropriate comments about genitalia during bathing activity and towards therapy student. Pt doffed and donned hospital gown with mod A d/t confusion with lines and situation. Pt in bed upon exit with bed alarm on and sitter in room. Tone RUE  RUE Tone: Normotonic  Tone LUE  LUE Tone: Normotonic  Coordination  Movements Are Fluid And Coordinated: No  Coordination and Movement description: Fine motor impairments;Decreased accuracy; Right UE;Left UE  Quality of Movement Other  Comment: Pt had difficulty grasping socks for donning, and grasping washcloths for bathing activity during session. Pt completed finger opposition with increased time, however decreased fine motor coordination seen during functional tasks. Bed mobility  Supine to Sit: Minimal assistance  Sit to Supine: Minimal assistance  Scooting: Minimal assistance  Comment: Pt impulsively got out of bed during session. Transfers  Sit to stand: Moderate assistance  Stand to sit: Moderate assistance     Cognition  Overall Cognitive Status: Exceptions  Arousal/Alertness: Delayed responses to stimuli;Inconsistent responses to stimuli  Following Commands: Follows one step commands with increased time; Follows one step commands with repetition  Attention Span: Difficulty attending to directions; Difficulty dividing attention  Memory: Decreased recall of biographical Information;Decreased recall of precautions;Decreased recall of recent events  Safety Judgement: Decreased awareness of need for assistance;Decreased awareness of need for safety  Problem Solving: Assistance required to generate solutions;Assistance required to implement solutions;Assistance required to correct errors made;Assistance required to identify errors made;Decreased awareness of errors  Insights: Decreased awareness of deficits  Initiation: Requires cues for all  Sequencing: Requires cues for all        Sensation  Overall Sensation Status: Impaired(pt reports decreased

## 2020-08-24 NOTE — PROGRESS NOTES
Critical Care Team - Daily Progress Note      Date and time: 8/24/2020 7:24 AM  Patient's name:  Varghese Rosales Record Number: [id number]  Patient's account/billing number: [de-identified]  Patient's YOB: 1985  Age: 38 y.o. Date of Admission: 8/14/2020  5:18 AM  Length of stay during current admission: 10      Primary Care Physician: No primary care provider on file. ICU Attending Physician: Dr. Galloway Bodily Status: Full Code    Reason for ICU admission: Acute hypoxic respiratory failure secondary to recreational drug abuse. SUBJECTIVE:     OVERNIGHT EVENTS:   Had episodes of agitation and combative nature. The patient is sedated with Versed, Precedex and  Seroquel 250 mg    Current Evaluation :     Patient is still intubated highly sedated and mechanically ventilated,   Ventilation status : On mechanical ventilation    Review of Systems   Unable to perform ROS: Intubated   Unable to obtain as the patient is highly sedated intubated     ventilatory setting :    Mode : PRVC:   PEEP 5 mm , Fio2 30, RR 15, Min Vent  8.82   ABG : 08/19 pH of 7.3 PCO2 48.7 bicarbonate 27.9 PO2 85.6 O2 saturations 96. Secretions : copious  Sedation:   Paralyzed : Paralyzed    Vitals stable with blood pressure of 102/63, not on any pressors  Sibley placed  , Urine output of 192 5 mL    Diet : Tube feeding  Tube Feeds : OG/NS with rate of 50 mL with any mild residual.    Fluids : Total Intake of 2545 mL, Total output of 192 5 mL with a net positive of 620 mL in  last 24 hrs. Pt receiving 0.9% normal saline  Total net positive of 5.5 L since admission. Significant Labs : EKG QTc interval 427, MCV of 104.4,  Platelets of 6 41,385 increasing ALT AST elevated alkaline phosphatase 450. Creatininekinase 401.        Active Meds: Seroquel 250 mg p.o. twice daily, dexmedetomidine Precedex 400 mcg, , propofol IV, Ativan 1 mg prn famotidine 20 mg IV    Symptomatic Treatment: chlorhexidine 0.12% solution docusate 50 mg, enoxaparin 40 mg,      AWAKE & FOLLOWING COMMANDS:  [] No   [x] Yes    CURRENT VENTILATION STATUS:     [x] Ventilator  [] BIPAP  [] Nasal Cannula [] Room Air      IF INTUBATED, ET TUBE MARKING AT LOWER LIP:       cms    SECRETIONS Amount:  [] Small [] Moderate  [x] Large  [] None  Color:     [] White [] Colored  [] Bloody    SEDATION:  RAAS Score:  [x] Propofol gtt  [x] Versed gtt  [] Ativan gtt   [] No Sedation    PARALYZED:  [x] No    [] Yes    DIARRHEA:                [x] No                [] Yes  (C. Difficile status: [] positive                                                                                                                       [] negative                                                                                                                     [] pending)    VASOPRESSORS:  [x] No    [] Yes    If yes -   [] Levophed       [] Dopamine     [] Vasopressin       [] Dobutamine  [] Phenylephrine         [] Epinephrine    CENTRAL LINES:     [] No   [] Yes   (Date of Insertion:   )           If yes -     [] Right IJ     [] Left IJ [] Right Femoral [] Left Femoral                   [] Right Subclavian [] Left Subclavian       SALAMANCA'S CATHETER:   [] No   [] Yes  (Date of Insertion:   )     URINE OUTPUT:            [x] Good   [] Low              [] Anuric      OBJECTIVE:     VITAL SIGNS:  /63   Pulse 71   Temp 98.6 °F (37 °C) (Oral)   Resp 16   Ht 5' 4\" (1.626 m)   Wt 147 lb 7.8 oz (66.9 kg)   SpO2 100%   BMI 25.32 kg/m²   Tmax over 24 hours:  Temp (24hrs), Av.4 °F (36.9 °C), Min:97.9 °F (36.6 °C), Max:98.6 °F (37 °C)      Patient Vitals for the past 8 hrs:   BP Temp Temp src Pulse Resp SpO2 Weight   20 0630 102/63 -- -- 71 16 100 % --   20 0600 (!) 117/98 -- -- 69 18 99 % --   20 0530 100/63 -- -- 66 15 100 % --   20 0500 103/62 -- -- 73 15 100 % --   20 0430 105/67 -- -- 72 16 100 % --   20 0400 103/64 98.6 °F (37 °C) Oral 78 16 100 % -- 08/24/20 0337 -- -- -- 74 19 100 % --   08/24/20 0330 107/79 -- -- 79 18 100 % --   08/24/20 0300 88/62 98.6 °F (37 °C) -- 75 17 100 % --   08/24/20 0230 (!) 85/50 -- -- 63 18 100 % --   08/24/20 0200 94/65 -- -- 59 15 100 % --   08/24/20 0130 -- -- -- 60 15 100 % --   08/24/20 0100 113/72 98.4 °F (36.9 °C) Oral 60 15 99 % 147 lb 7.8 oz (66.9 kg)   08/24/20 0030 104/70 -- -- 63 17 100 % --   08/24/20 0000 110/72 -- -- 62 17 100 % --   08/23/20 2341 -- -- -- 62 17 -- --   08/23/20 2330 105/69 -- -- 63 17 100 % --         Intake/Output Summary (Last 24 hours) at 8/24/2020 0724  Last data filed at 8/24/2020 0348  Gross per 24 hour   Intake 2545.33 ml   Output 1925 ml   Net 620.33 ml     Date 08/24/20 0000 - 08/24/20 2359   Shift 9447-7688 7423-3779 7157-2407 24 Hour Total   INTAKE   I.V.(mL/kg) 319.4(4.8)   319.4(4.8)   NG/GT(mL/kg) 445(6.7)   445(6.7)   Shift Total(mL/kg) 764. 4(11.4)   764. 4(11.4)   OUTPUT   Urine(mL/kg/hr) 1000   1000   Shift Total(mL/kg) 1000(14.9)   1000(14.9)   Weight (kg) 66.9 66.9 66.9 66.9     Wt Readings from Last 3 Encounters:   08/24/20 147 lb 7.8 oz (66.9 kg)   08/13/20 150 lb (68 kg)   08/10/19 126 lb 15.8 oz (57.6 kg)     Body mass index is 25.32 kg/m². PHYSICAL EXAM:  Constitutional: Patient still intubated sedated on mechanical ventilated barely able to response to my commands today  EENT: PERRLA, EOMI, sclera clear, anicteric, oropharynx clear, no lesions, neck supple with midline trachea. Neck: Supple, symmetrical, trachea midline  Respiratory: clear to auscultation bilateral vesicular no wheezes or rales and unlabored breathing. No intercostal tenderness  Cardiovascular: regular rate and rhythm, normal S1, S2, no murmur noted and 2+ pulses throughout  Abdomen: soft, nontender, nondistended, no masses or organomegaly  Neurologic: Still sedated and barely responds to commands.   Sensory and motor examinations are grossly intact  Extremities:  peripheral pulses normal, no pedal edema, no clubbing or cyanosis      MEDICATIONS:  Scheduled Meds:   QUEtiapine  250 mg Oral BID    enoxaparin  40 mg Subcutaneous Daily    chlorhexidine  15 mL Mouth/Throat BID    famotidine (PEPCID) injection  20 mg Intravenous BID    sodium chloride flush  10 mL Intravenous 2 times per day    sodium chloride flush  10 mL Intravenous 2 times per day    docusate  100 mg Per NG tube BID     Continuous Infusions:   propofol 15 mcg/kg/min (08/24/20 0348)    dexmedetomidine 1.4 mcg/kg/hr (08/24/20 0413)    sodium chloride 5 mL/hr at 08/22/20 0848    fentaNYL Stopped (08/24/20 0400)    midazolam 8 mg/hr (08/24/20 0640)     PRN Meds:   LORazepam, 1 mg, Q4H PRN  haloperidol lactate, 5 mg, Q2H PRN  albuterol, 2.5 mg, As Directed RT PRN  sodium chloride flush, 10 mL, PRN  sodium chloride flush, 10 mL, PRN  acetaminophen, 650 mg, Q6H PRN    Or  acetaminophen, 650 mg, Q6H PRN  polyethylene glycol, 17 g, Daily PRN  promethazine, 12.5 mg, Q6H PRN    Or  ondansetron, 4 mg, Q6H PRN  artificial tears, , PRN        SUPPORT DEVICES: [x] Ventilator [] BIPAP  [] Nasal Cannula [] Room Air    VENT SETTINGS (Comprehensive) (if applicable):   PRVC mode, FiO2 30%, PEEP 5, Respiratory Rate 15, Tidal Volume 315  Vent Information  $Ventilation: $Subsequent Day  Skin Assessment: Clean, dry, & intact  Suction Catheter Diameter: 14  Equipment ID: [id number]  Equipment Changed: HME  Vent Type: Servo i  Vent Mode: PRVC  Vt Ordered: 490 mL  Rate Set: 15 bmp  Pressure Support: 6 cmH20  FiO2 : 30 %  SpO2: 100 %  SpO2/FiO2 ratio: 333.33  Sensitivity: 4  PEEP/CPAP: 5  I Time/ I Time %: 0.9 s  Humidification Source: HME  Nitric Oxide/Epoprostenol In Use?: No  Additional Respiratory  Assessments  Pulse: 71  Resp: 16  SpO2: 100 %  End Tidal CO2: 37 (%)  Position: Semi-Omer's  Humidification Source: HME  Oral Care Completed?: Yes  Oral Care: Mouth swabbed, Mouth suctioned  Subglottic Suction Done?: Yes  Cuff Pressure (cm H2O): 18 cm H2O  Skin barrier applied: No    ABGs:     Lab Results   Component Value Date    PHART 7.218 08/14/2020    XQD2PVA 56.3 08/14/2020    PO2ART 82.3 08/14/2020    TRP8FUJ 22.9 08/14/2020    XZT5FBX 29 08/19/2020    S4BBKKNX 93.1 08/14/2020    FIO2 30.0 08/19/2020     Lactic Acid:   Lab Results   Component Value Date    LACTA NOT REPORTED 08/15/2020    LACTA NOT REPORTED 08/15/2020    LACTA NOT REPORTED 08/15/2020         DATA:  Complete Blood Count:   Recent Labs     08/22/20  0634 08/23/20  0526 08/24/20  0515   WBC 7.5 8.5 10.3   HGB 12.2* 12.0* 12.2*   MCV 94.7 98.7 104.4*    548* 664*   RBC 3.75* 3.73* 3.83*   HCT 35.5* 36.8* 40.0*   MCH 32.5 32.2 31.9   MCHC 34.4 32.6 30.5   RDW 13.0 13.2 13.5   MPV 9.9 10.2 10.2        PT/INR:    Lab Results   Component Value Date    PROTIME 14.2 08/13/2020    INR 1.1 08/13/2020     PTT:    Lab Results   Component Value Date    APTT 53.2 08/15/2020       Basal Metabolic Profile:   Recent Labs     08/22/20  0634 08/23/20  0526 08/24/20  0515    138 140   K 4.4 4.4 4.4   BUN 14 17 21*   CREATININE 0.60* 0.67* 0.75    102 103   CO2 26 24 23      Magnesium:   Lab Results   Component Value Date    MG 2.5 08/24/2020    MG 2.4 08/23/2020    MG 2.2 08/22/2020     Phosphorus:   Lab Results   Component Value Date    PHOS 4.5 08/24/2020    PHOS 4.7 08/23/2020    PHOS 4.6 08/22/2020     S. Calcium:  Recent Labs     08/24/20  0515   CALCIUM 9.2     S. Ionized Calcium:No results for input(s): IONCA in the last 72 hours.       Urinalysis:   Lab Results   Component Value Date    NITRU NEGATIVE 08/14/2020    COLORU YELLOW 08/14/2020    PHUR 6.0 08/14/2020    WBCUA 0 TO 2 08/14/2020    RBCUA 2 TO 5 08/14/2020    MUCUS NOT REPORTED 08/14/2020    TRICHOMONAS NOT REPORTED 08/14/2020    YEAST NOT REPORTED 08/14/2020    BACTERIA NOT REPORTED 08/14/2020    SPECGRAV 1.014 08/14/2020    LEUKOCYTESUR NEGATIVE 08/14/2020    UROBILINOGEN Normal 08/14/2020    BILIRUBINUR NEGATIVE 08/14/2020 GLUCOSEU 2+ 08/14/2020    KETUA NEGATIVE 08/14/2020    AMORPHOUS 1+ 08/14/2020       CARDIAC ENZYMES: No results for input(s): CKMB, CKMBINDEX, TROPONINI in the last 72 hours. Invalid input(s): CKTOTAL;3  BNP: No results for input(s): BNP in the last 72 hours. LFTS  Recent Labs     08/22/20  0634 08/23/20  0526 08/24/20  0515   ALKPHOS 190* 241* 450*   ALT 74* 70* 133*   AST 57* 42* 134*   BILITOT 0.33 0.27* 0.30   LABALBU 3.1* 2.7* 2.9*       AMYLASE/LIPASE/AMMONIA  No results for input(s): AMYLASE, LIPASE, AMMONIA in the last 72 hours. Last 3 Blood Glucose:   Recent Labs     08/22/20  0634 08/23/20  0526 08/24/20  0515   GLUCOSE 111* 93 111*      HgBA1c:  No results found for: LABA1C      TSH:  No results found for: TSH  ANEMIA STUDIES  No results for input(s): LABIRON, TIBC, FERRITIN, [id number], FOLATE, OCCULTBLD in the last 72 hours. Cultures during this admission:     Blood cultures:                 [] None drawn      [] Negative             [x]  Positive (Details:  )  Urine Culture:                   [] None drawn      [] Negative             []  Positive (Details:  )  Sputum Culture:               [] None drawn       [] Negative             [x]  Positive (Details:  )   Endotracheal aspirate:     [] None drawn       [] Negative             []  Positive (Details:  )     Blood cultures are positive for Serratia and subsequent blood cultures showed no growth patient was given piperacillin tazobactam 6-day course which was stopped 2 days ago. ASSESSMENT:   Active Problems:    Acute respiratory failure with hypoxia (HCC)    Sepsis due to Gram-negative organism with septic shock (HCC)    Intravenous drug abuse ([facility] [address]    Toxic metabolic encephalopathy  Resolved Problems:    * No resolved hospital problems. *        PLAN:     Patient is still having brief episodes of agitation. Controlled with as needed Ativan. Currently he is on propofol Versed drip and Precedex.   Fentanyl has been restless while on Precedex drip but not agitated and more cooperative. He is on CPAP/pressure support 6/5/1930 percent and his respiratory rate is 21 and tidal volumes around 400. He had finished treatment with Zosyn for Serratia bacteremia and currently WBC count is 10.3 hemoglobin has been stable, his AST ALT is increasing. Last blood culture 6 days ago was negative and he has been afebrile his last sputum culture was positive for MSSA and his blood culture on 08/14/2020 was positive for Serratia. Since he is tolerating spontaneous breathing trial will extubate and give him a trial of extubation. Continue with Precedex drip. May need to resume fentanyl drip. Once he is able to take orally may need to start him on oxycodone. Repeat chest x-ray. Follow-up liver function test tomorrow. Discussed with nursing staff, treatment and plan discussed. Discussed with respiratory therapist.    Total critical care time caring for this patient with life threatening, unstable organ failure, including direct patient contact, management of life support systems, review of data including imaging and labs, discussions with other team members and physicians at least 27  Min so far today, excluding procedures. Please note that this chart was generated using voice recognition Dragon dictation software. Although every effort was made to ensure the accuracy of this automated transcription, some errors in transcription may have occurred.      Jan Mendoza MD  8/24/2020 11:01 AM

## 2020-08-25 ENCOUNTER — APPOINTMENT (OUTPATIENT)
Dept: GENERAL RADIOLOGY | Age: 35
DRG: 812 | End: 2020-08-25
Attending: INTERNAL MEDICINE
Payer: MEDICAID

## 2020-08-25 LAB
-: ABNORMAL
ABSOLUTE EOS #: <0.03 K/UL (ref 0–0.44)
ABSOLUTE IMMATURE GRANULOCYTE: 0.13 K/UL (ref 0–0.3)
ABSOLUTE LYMPH #: 1.48 K/UL (ref 1.1–3.7)
ABSOLUTE MONO #: 0.9 K/UL (ref 0.1–1.2)
ALBUMIN SERPL-MCNC: 4.1 G/DL (ref 3.5–5.2)
ALBUMIN/GLOBULIN RATIO: 1 (ref 1–2.5)
ALP BLD-CCNC: 412 U/L (ref 40–129)
ALT SERPL-CCNC: 122 U/L (ref 5–41)
AMORPHOUS: ABNORMAL
ANION GAP SERPL CALCULATED.3IONS-SCNC: 15 MMOL/L (ref 9–17)
AST SERPL-CCNC: 94 U/L
BACTERIA: ABNORMAL
BASOPHILS # BLD: 0 % (ref 0–2)
BASOPHILS ABSOLUTE: 0.04 K/UL (ref 0–0.2)
BILIRUB SERPL-MCNC: 0.47 MG/DL (ref 0.3–1.2)
BILIRUBIN URINE: NEGATIVE
BUN BLDV-MCNC: 17 MG/DL (ref 6–20)
BUN/CREAT BLD: ABNORMAL (ref 9–20)
C. TRACHOMATIS DNA ,URINE: NEGATIVE
CALCIUM IONIZED: 1.14 MMOL/L (ref 1.13–1.33)
CALCIUM SERPL-MCNC: 9.6 MG/DL (ref 8.6–10.4)
CASTS UA: ABNORMAL /LPF (ref 0–8)
CHLORIDE BLD-SCNC: 100 MMOL/L (ref 98–107)
CO2: 25 MMOL/L (ref 20–31)
COLOR: ABNORMAL
CREAT SERPL-MCNC: 0.84 MG/DL (ref 0.7–1.2)
CRYSTALS, UA: ABNORMAL /HPF
DIFFERENTIAL TYPE: ABNORMAL
EKG ATRIAL RATE: 101 BPM
EKG P AXIS: 63 DEGREES
EKG P-R INTERVAL: 128 MS
EKG Q-T INTERVAL: 436 MS
EKG QRS DURATION: 100 MS
EKG QTC CALCULATION (BAZETT): 565 MS
EKG R AXIS: -43 DEGREES
EKG T AXIS: 48 DEGREES
EKG VENTRICULAR RATE: 101 BPM
EOSINOPHILS RELATIVE PERCENT: 0 % (ref 1–4)
EPITHELIAL CELLS UA: ABNORMAL /HPF (ref 0–5)
GFR AFRICAN AMERICAN: >60 ML/MIN
GFR NON-AFRICAN AMERICAN: >60 ML/MIN
GFR SERPL CREATININE-BSD FRML MDRD: ABNORMAL ML/MIN/{1.73_M2}
GFR SERPL CREATININE-BSD FRML MDRD: ABNORMAL ML/MIN/{1.73_M2}
GLUCOSE BLD-MCNC: 127 MG/DL (ref 70–99)
GLUCOSE URINE: NEGATIVE
HCT VFR BLD CALC: 39.7 % (ref 40.7–50.3)
HEMOGLOBIN: 13.3 G/DL (ref 13–17)
IMMATURE GRANULOCYTES: 1 %
KETONES, URINE: ABNORMAL
LEUKOCYTE ESTERASE, URINE: NEGATIVE
LYMPHOCYTES # BLD: 8 % (ref 24–43)
MAGNESIUM: 2.2 MG/DL (ref 1.6–2.6)
MCH RBC QN AUTO: 31.5 PG (ref 25.2–33.5)
MCHC RBC AUTO-ENTMCNC: 33.5 G/DL (ref 28.4–34.8)
MCV RBC AUTO: 94.1 FL (ref 82.6–102.9)
MONOCYTES # BLD: 5 % (ref 3–12)
MUCUS: ABNORMAL
N. GONORRHOEAE DNA, URINE: NEGATIVE
NITRITE, URINE: NEGATIVE
NRBC AUTOMATED: 0 PER 100 WBC
OTHER OBSERVATIONS UA: ABNORMAL
PDW BLD-RTO: 13.5 % (ref 11.8–14.4)
PH UA: 5.5 (ref 5–8)
PHOSPHORUS: 4 MG/DL (ref 2.5–4.5)
PLATELET # BLD: 721 K/UL (ref 138–453)
PLATELET ESTIMATE: ABNORMAL
PMV BLD AUTO: 9.5 FL (ref 8.1–13.5)
POTASSIUM SERPL-SCNC: 4.1 MMOL/L (ref 3.7–5.3)
PROTEIN UA: ABNORMAL
RBC # BLD: 4.22 M/UL (ref 4.21–5.77)
RBC # BLD: ABNORMAL 10*6/UL
RBC UA: ABNORMAL /HPF (ref 0–4)
RENAL EPITHELIAL, UA: ABNORMAL /HPF
SEG NEUTROPHILS: 87 % (ref 36–65)
SEGMENTED NEUTROPHILS ABSOLUTE COUNT: 16.68 K/UL (ref 1.5–8.1)
SODIUM BLD-SCNC: 140 MMOL/L (ref 135–144)
SPECIFIC GRAVITY UA: 1.03 (ref 1–1.03)
SPECIMEN DESCRIPTION: NORMAL
TOTAL CK: 1025 U/L (ref 39–308)
TOTAL PROTEIN: 8.1 G/DL (ref 6.4–8.3)
TRICHOMONAS: ABNORMAL
TURBIDITY: CLEAR
URINE HGB: NEGATIVE
UROBILINOGEN, URINE: NORMAL
WBC # BLD: 19.2 K/UL (ref 3.5–11.3)
WBC # BLD: ABNORMAL 10*3/UL
WBC UA: ABNORMAL /HPF (ref 0–5)
YEAST: ABNORMAL

## 2020-08-25 PROCEDURE — 71045 X-RAY EXAM CHEST 1 VIEW: CPT

## 2020-08-25 PROCEDURE — 82330 ASSAY OF CALCIUM: CPT

## 2020-08-25 PROCEDURE — 6370000000 HC RX 637 (ALT 250 FOR IP): Performed by: STUDENT IN AN ORGANIZED HEALTH CARE EDUCATION/TRAINING PROGRAM

## 2020-08-25 PROCEDURE — 84100 ASSAY OF PHOSPHORUS: CPT

## 2020-08-25 PROCEDURE — 2060000000 HC ICU INTERMEDIATE R&B

## 2020-08-25 PROCEDURE — 2580000003 HC RX 258: Performed by: STUDENT IN AN ORGANIZED HEALTH CARE EDUCATION/TRAINING PROGRAM

## 2020-08-25 PROCEDURE — 99291 CRITICAL CARE FIRST HOUR: CPT | Performed by: INTERNAL MEDICINE

## 2020-08-25 PROCEDURE — 2500000003 HC RX 250 WO HCPCS: Performed by: INTERNAL MEDICINE

## 2020-08-25 PROCEDURE — 87040 BLOOD CULTURE FOR BACTERIA: CPT

## 2020-08-25 PROCEDURE — 93005 ELECTROCARDIOGRAM TRACING: CPT | Performed by: STUDENT IN AN ORGANIZED HEALTH CARE EDUCATION/TRAINING PROGRAM

## 2020-08-25 PROCEDURE — 85025 COMPLETE CBC W/AUTO DIFF WBC: CPT

## 2020-08-25 PROCEDURE — 6360000002 HC RX W HCPCS: Performed by: STUDENT IN AN ORGANIZED HEALTH CARE EDUCATION/TRAINING PROGRAM

## 2020-08-25 PROCEDURE — 82550 ASSAY OF CK (CPK): CPT

## 2020-08-25 PROCEDURE — 90792 PSYCH DIAG EVAL W/MED SRVCS: CPT | Performed by: NURSE PRACTITIONER

## 2020-08-25 PROCEDURE — 80053 COMPREHEN METABOLIC PANEL: CPT

## 2020-08-25 PROCEDURE — 83735 ASSAY OF MAGNESIUM: CPT

## 2020-08-25 PROCEDURE — 87086 URINE CULTURE/COLONY COUNT: CPT

## 2020-08-25 PROCEDURE — 97162 PT EVAL MOD COMPLEX 30 MIN: CPT

## 2020-08-25 PROCEDURE — 97530 THERAPEUTIC ACTIVITIES: CPT

## 2020-08-25 PROCEDURE — 36415 COLL VENOUS BLD VENIPUNCTURE: CPT

## 2020-08-25 PROCEDURE — 81001 URINALYSIS AUTO W/SCOPE: CPT

## 2020-08-25 RX ORDER — DOCUSATE SODIUM 100 MG/1
100 CAPSULE, LIQUID FILLED ORAL DAILY PRN
Status: DISCONTINUED | OUTPATIENT
Start: 2020-08-25 | End: 2020-08-26 | Stop reason: HOSPADM

## 2020-08-25 RX ORDER — OXYCODONE HYDROCHLORIDE 5 MG/1
5 TABLET ORAL EVERY 6 HOURS
Status: DISCONTINUED | OUTPATIENT
Start: 2020-08-25 | End: 2020-08-25

## 2020-08-25 RX ORDER — DOCUSATE SODIUM 100 MG/1
100 CAPSULE, LIQUID FILLED ORAL DAILY
Status: DISCONTINUED | OUTPATIENT
Start: 2020-08-25 | End: 2020-08-25

## 2020-08-25 RX ORDER — GUAIFENESIN 600 MG/1
600 TABLET, EXTENDED RELEASE ORAL 2 TIMES DAILY
Status: DISCONTINUED | OUTPATIENT
Start: 2020-08-25 | End: 2020-08-26 | Stop reason: HOSPADM

## 2020-08-25 RX ORDER — MAGNESIUM SULFATE 1 G/100ML
1 INJECTION INTRAVENOUS ONCE
Status: COMPLETED | OUTPATIENT
Start: 2020-08-25 | End: 2020-08-25

## 2020-08-25 RX ORDER — FAMOTIDINE 20 MG/1
20 TABLET, FILM COATED ORAL 2 TIMES DAILY
Status: DISCONTINUED | OUTPATIENT
Start: 2020-08-25 | End: 2020-08-26

## 2020-08-25 RX ORDER — QUETIAPINE FUMARATE 200 MG/1
200 TABLET, FILM COATED ORAL 2 TIMES DAILY
Status: DISCONTINUED | OUTPATIENT
Start: 2020-08-25 | End: 2020-08-26 | Stop reason: HOSPADM

## 2020-08-25 RX ORDER — OXYCODONE HYDROCHLORIDE 5 MG/1
5 TABLET ORAL ONCE
Status: COMPLETED | OUTPATIENT
Start: 2020-08-25 | End: 2020-08-25

## 2020-08-25 RX ORDER — OXYCODONE HYDROCHLORIDE 5 MG/1
5 TABLET ORAL EVERY 6 HOURS PRN
Status: DISCONTINUED | OUTPATIENT
Start: 2020-08-25 | End: 2020-08-25

## 2020-08-25 RX ORDER — OXYCODONE HYDROCHLORIDE 5 MG/1
10 TABLET ORAL EVERY 6 HOURS
Status: DISCONTINUED | OUTPATIENT
Start: 2020-08-25 | End: 2020-08-26 | Stop reason: HOSPADM

## 2020-08-25 RX ADMIN — FENTANYL CITRATE 75 MCG: 50 INJECTION, SOLUTION INTRAMUSCULAR; INTRAVENOUS at 04:59

## 2020-08-25 RX ADMIN — GUAIFENESIN 600 MG: 600 TABLET, EXTENDED RELEASE ORAL at 03:07

## 2020-08-25 RX ADMIN — OXYCODONE HYDROCHLORIDE 10 MG: 5 TABLET ORAL at 21:34

## 2020-08-25 RX ADMIN — CEFTRIAXONE SODIUM 1 G: 1 INJECTION, POWDER, FOR SOLUTION INTRAMUSCULAR; INTRAVENOUS at 16:27

## 2020-08-25 RX ADMIN — FENTANYL CITRATE 75 MCG: 50 INJECTION, SOLUTION INTRAMUSCULAR; INTRAVENOUS at 00:09

## 2020-08-25 RX ADMIN — SODIUM CHLORIDE, PRESERVATIVE FREE 10 ML: 5 INJECTION INTRAVENOUS at 21:34

## 2020-08-25 RX ADMIN — Medication 100 MG: at 08:56

## 2020-08-25 RX ADMIN — SODIUM CHLORIDE, PRESERVATIVE FREE 10 ML: 5 INJECTION INTRAVENOUS at 09:00

## 2020-08-25 RX ADMIN — FAMOTIDINE 20 MG: 10 INJECTION, SOLUTION INTRAVENOUS at 08:57

## 2020-08-25 RX ADMIN — ONDANSETRON 4 MG: 2 INJECTION INTRAMUSCULAR; INTRAVENOUS at 00:07

## 2020-08-25 RX ADMIN — ENOXAPARIN SODIUM 40 MG: 40 INJECTION SUBCUTANEOUS at 09:22

## 2020-08-25 RX ADMIN — OXYCODONE HYDROCHLORIDE 5 MG: 5 TABLET ORAL at 11:05

## 2020-08-25 RX ADMIN — GUAIFENESIN 600 MG: 600 TABLET, EXTENDED RELEASE ORAL at 21:34

## 2020-08-25 RX ADMIN — GUAIFENESIN 600 MG: 600 TABLET, EXTENDED RELEASE ORAL at 11:06

## 2020-08-25 RX ADMIN — SODIUM CHLORIDE: 9 INJECTION, SOLUTION INTRAVENOUS at 08:57

## 2020-08-25 RX ADMIN — ONDANSETRON 4 MG: 2 INJECTION INTRAMUSCULAR; INTRAVENOUS at 06:56

## 2020-08-25 RX ADMIN — OXYCODONE HYDROCHLORIDE 10 MG: 5 TABLET ORAL at 16:20

## 2020-08-25 RX ADMIN — FAMOTIDINE 20 MG: 20 TABLET, FILM COATED ORAL at 21:34

## 2020-08-25 RX ADMIN — MAGNESIUM SULFATE HEPTAHYDRATE 1 G: 1 INJECTION, SOLUTION INTRAVENOUS at 08:57

## 2020-08-25 ASSESSMENT — PAIN SCALES - GENERAL
PAINLEVEL_OUTOF10: 6
PAINLEVEL_OUTOF10: 8
PAINLEVEL_OUTOF10: 7
PAINLEVEL_OUTOF10: 6
PAINLEVEL_OUTOF10: 7
PAINLEVEL_OUTOF10: 6

## 2020-08-25 ASSESSMENT — PAIN DESCRIPTION - ONSET
ONSET: ON-GOING
ONSET: ON-GOING

## 2020-08-25 ASSESSMENT — PAIN DESCRIPTION - LOCATION
LOCATION: GENERALIZED
LOCATION: GENERALIZED

## 2020-08-25 ASSESSMENT — PAIN DESCRIPTION - PAIN TYPE
TYPE: ACUTE PAIN
TYPE: ACUTE PAIN

## 2020-08-25 ASSESSMENT — PAIN DESCRIPTION - DESCRIPTORS
DESCRIPTORS: ACHING;SORE
DESCRIPTORS: ACHING;SORE

## 2020-08-25 NOTE — PROGRESS NOTES
[address] [place] [address]     Department of Internal Medicine - Staff Internal Medicine Service   ICU PATIENT TRANSFER NOTE        Patient:  Karishma Greco  YOB: 1985  MRN: [id number]     Acct: [de-identified]     Admit date: 8/14/2020    Code Status:-  Full code     Reason for ICU Admission:-   Acute hypoxic respiratory failure secondary to recreational drug abuse    SUPPORT DEVICES: [] Ventilator [] BIPAP  [] Nasal Cannula [x] Room Air    Consultations:- [] Cardiology [] Nephrology  [] Hemo onco  [] GI                               [] ID [] ENT  [] Rheum [] Endo   []Physiotherapy                                 Others:- Psychiatry, Pumlonary    NUTRITION:  [] NPO [] Tube Feeding (Specify: ) [] TPN  [x] PO    Central Lines:- [x] No   [] Yes           If yes - Days/Date of Insertion. Pt seen,examined and Chart reviewed. ICU COURSE:    The patient is a 38 y.o. male who is admitted medically for treatment of acute hypoxic respiratory failure suspected to be due to recreational drug use. Bystanders called EMS and perform CPR. Attempts to extubate patient were unsuccessful, as he became agitated. He was started on Seroquel that was increased slowly from 25 mg twice daily to 50 mg twice daily and 100 mg twice daily. He also received Haldol for acute agitation. Patient was extubated 8/24, and Seroquel increased to 200 mg twice daily. She is atient was reportedly taking Seroquel 250 mg twice daily prior to admission. ICU course  Hx IVDA,From [facility] hypoxic, elevated trops, levo. Acute hypoxic respiratory failure, with Overdose, rayne pre-renal  8/14: paralyzed, stated on empiric antibiotcs fo presumed aspiration  8/15: stop paralytic, stop versed drip, growing serratia marscesans in blood cultures antibiotics changed to zosyn  8/18: Added Qutiapine 25 mg BID, Spontaneous Breathing Ttrial (SBT)  8/19: Sbt not tolerated still agitated , Quitiapine increased to 50 mg bid.   08/20 : Patient initially showed improvement with daily Seroquel 50 mg twice daily . SBT overnight unsuccessful patient agitated and was given IM olanzapine as he is pulling out the tubes. 08/21: Agitated and was restrained. SBT not done overnight. Went back up on his sedation. Over night he was agitated and irritated and was danger to self. Propofol was started to sedate and Seroquil increased to 100mg BID and Haloperidol was given for acute agitation. 08/24 : Pt is extubated and is saturating well still delirious with all the weaning sedative drugs     Currently the patient is shifted from ICU to the floors. Patient is hemodynamically stable and afebrile. Vitals heart rate 111, /85, RR 17, saturating 97% on room air  Denies any acute complaints    Physical Exam:   Vitals: BP (!) 135/92   Pulse 104   Temp 99.3 °F (37.4 °C) (Oral)   Resp 30   Ht 5' 4\" (1.626 m)   Wt 147 lb 7.8 oz (66.9 kg)   SpO2 100%   BMI 25.32 kg/m²   24 hour intake/output:    Intake/Output Summary (Last 24 hours) at 8/25/2020 1656  Last data filed at 8/25/2020 1330  Gross per 24 hour   Intake 120 ml   Output 750 ml   Net -630 ml     Last 3 weights:   Wt Readings from Last 3 Encounters:   08/24/20 147 lb 7.8 oz (66.9 kg)   08/13/20 150 lb (68 kg)   08/10/19 126 lb 15.8 oz (57.6 kg)       General appearance - alert, in no distress  Mental status - alert, oriented to person, place, and time  Eyes - pupils equal and reactive, extraocular eye movements intact  Mouth - mucous membranes moist, pharynx normal without lesions  Neck - supple, no significant adenopathy  Chest - clear to auscultation, no wheezes  Heart - normal rate, regular rhythm, normal S1, S2  Abdomen - soft, nontender, nondistended  Neurological - alert, oriented, normal speech, no focal deficits   Extremities - peripheral pulses normal, no pedal edema  Skin - normal coloration and turgor, no rashes      Medications:Current Inpatient  Scheduled Meds:   guaiFENesin  600 mg Oral BID    [Held by provider] QUEtiapine  200 mg Oral BID    famotidine  20 mg Oral BID    oxyCODONE  10 mg Oral Q6H    cefTRIAXone (ROCEPHIN) IV  1 g Intravenous Q24H    nicotine  1 patch Transdermal Daily    enoxaparin  40 mg Subcutaneous Daily    chlorhexidine  15 mL Mouth/Throat BID    sodium chloride flush  10 mL Intravenous 2 times per day    sodium chloride flush  10 mL Intravenous 2 times per day     Continuous Infusions:   sodium chloride Stopped (08/25/20 1254)     PRN Meds:docusate sodium, benzocaine-menthol, LORazepam, fentanNYL, albuterol, sodium chloride flush, sodium chloride flush, acetaminophen **OR** acetaminophen, polyethylene glycol, promethazine **OR** ondansetron, artificial tears    Objective:    CBC:   Recent Labs     08/23/20  0526 08/24/20  0515 08/25/20  0549   WBC 8.5 10.3 19.2*   HGB 12.0* 12.2* 13.3   * 664* 721*     BMP:    Recent Labs     08/23/20  0526 08/24/20  0515 08/25/20  0549    140 140   K 4.4 4.4 4.1    103 100   CO2 24 23 25   BUN 17 21* 17   CREATININE 0.67* 0.75 0.84   GLUCOSE 93 111* 127*     Calcium:  Recent Labs     08/25/20  0549   CALCIUM 9.6     Ionized Calcium:No results for input(s): IONCA in the last 72 hours. Magnesium:  Recent Labs     08/25/20  0549   MG 2.2     Phosphorus:  Recent Labs     08/25/20  0549   PHOS 4.0     BNP:No results for input(s): BNP in the last 72 hours. Glucose:No results for input(s): POCGLU in the last 72 hours. HgbA1C: No results for input(s): LABA1C in the last 72 hours. INR: No results for input(s): INR in the last 72 hours. Hepatic:   Recent Labs     08/25/20  0549   ALKPHOS 412*   *   AST 94*   PROT 8.1   BILITOT 0.47   LABALBU 4.1     Amylase and Lipase:No results for input(s): LACTA, AMYLASE in the last 72 hours. Lactic Acid: No results for input(s): LACTA in the last 72 hours.   CARDIAC ENZYMES:  Recent Labs     08/23/20  0526 08/24/20  0515 08/25/20  0549   CKTOTAL 545* 401* 1,025* BNP: No results for input(s): BNP in the last 72 hours. Lipids:   Recent Labs     08/22/20  1947   TRIG 208*     ABGs: No results found for: PH, PCO2, PO2, HCO3, O2SAT  Thyroid: No results found for: TSH     Urinalysis: No results for input(s): BACTERIA, BLOODU, CLARITYU, COLORU, PHUR, PROTEINU, RBCUA, SPECGRAV, BILIRUBINUR, NITRU, WBCUA, LEUKOCYTESUR, GLUCOSEU in the last 72 hours. Assessment:  Active Problems:    Acute respiratory failure with hypoxia (HCC)    Sepsis due to Gram-negative organism with septic shock (HCC)    Intravenous drug abuse ([facility] [address]    Toxic metabolic encephalopathy  Resolved Problems:    * No resolved hospital problems. *          Plan:    1.  Acute hypoxic respiratory failure secondary to recreational drug abuse     --Status resolved. --patient is extubated and is breathing comfortably on nasal cannula  --Repeat x-ray showed normal findings.     2. Suspected Cystitis     --WBC count 19.2, Pt afebrile temperature of 99 Fahrenheit. --Patient removed the Sibley by himself. --Blood culture: No growth      3.   Hypo-kalemia hypophosphatemia and hypomagnesemia.     --Status resolved with potassium of 4.1 phosphorus of 4 and magnesium of 2.2        DVT Prophylaxis: Enoxaparin 40 mg subcutaneous  Stress ulcer: Famotidine 20mg po.         Eliu Espinal MD             Department of Internal Medicine  [facility]           8/25/2020, 4:56 PM

## 2020-08-25 NOTE — PROGRESS NOTES
Critical Care Team - Daily Progress Note      Date and time: 8/25/2020 7:07 AM  Patient's name:  Varghese Rosales Record Number: [id number]  Patient's account/billing number: [de-identified]  Patient's YOB: 1985  Age: 38 y.o. Date of Admission: 8/14/2020  5:18 AM  Length of stay during current admission: 11      Primary Care Physician: No primary care provider on file. ICU Attending Physician: Dr. Piyush Law    Code Status: Full Code    Reason for ICU admission: Acute Hypoxic Respiratory failure secondary to Recreational drug abuse. SUBJECTIVE:     OVERNIGHT EVENTS:    Patient was extubated yesterday no acute events overnight he is stable and less anxious. Current Evaluation : Patient is awake alert and oriented and complains of mild pain. Patient is off sedation  Vitals stable with blood pressure of 123 x 68 pulse of 100 saturations of 100  Review of Systems   Musculoskeletal: Positive for myalgias. All other systems reviewed and are negative. Diet : Oral diet, tube feedings were stopped  Fluids : Fluids of 573 mL intake and output of 1.6 L with a net negative of 1 L in the last 24 hours with +4.4 L of fluid since admission    Significant Labs : EKG right bundle branch block with QTc prolongation of 565, repeat EKG was ordered after IV magnesium sulfate 1 g IV is given. Post IV magnesium patient QTC normalized to 444    X-ray resolved pulmonary infiltrates     WBC of 19.2. Active Meds: Nicotine patch, fentanyl 75 mcg PRN.      Symptomatic Treatment: Docusate po    AWAKE & FOLLOWING COMMANDS:  [] No   [x] Yes    CURRENT VENTILATION STATUS:     [] Ventilator  [] BIPAP  [x] Nasal Cannula [] Room Air      IF INTUBATED, ET TUBE MARKING AT LOWER LIP:       cms    SECRETIONS Amount:  [] Small [] Moderate  [] Large  [x] None  Color:     [] White [] Colored  [] Bloody    SEDATION:  RAAS Score:  [] Propofol gtt  [] Versed gtt  [] Ativan gtt   [x] No Sedation    PARALYZED:  [x] No    [] Yes    DIARRHEA:                [x] No                [] Yes  (C. Difficile status: [] positive                                                                                                                       [] negative                                                                                                                     [] pending)    VASOPRESSORS:  [x] No    [] Yes    If yes -   [] Levophed       [] Dopamine     [] Vasopressin       [] Dobutamine  [] Phenylephrine         [] Epinephrine    CENTRAL LINES:     [x] No   [] Yes   (Date of Insertion:   )           If yes -     [] Right IJ     [] Left IJ [] Right Femoral [] Left Femoral                   [] Right Subclavian [] Left Subclavian       SALAMANCA'S CATHETER:   [x] No   [] Yes  (Date of Insertion:   )     URINE OUTPUT:            [x] Good   [] Low              [] Anuric      OBJECTIVE:     VITAL SIGNS:  /68   Pulse 100   Temp 99.8 °F (37.7 °C) (Oral)   Resp 27   Ht 5' 4\" (1.626 m)   Wt 147 lb 7.8 oz (66.9 kg)   SpO2 100%   BMI 25.32 kg/m²   Tmax over 24 hours:  Temp (24hrs), Av.5 °F (37.5 °C), Min:98.6 °F (37 °C), Max:99.8 °F (37.7 °C)      Patient Vitals for the past 8 hrs:   BP Temp Temp src Pulse Resp SpO2   20 0700 123/68 -- -- 100 27 100 %   20 0630 -- -- -- 101 26 100 %   20 0600 127/83 -- -- 105 (!) 31 100 %   20 0530 (!) 169/102 -- -- 116 (!) 33 100 %   20 0500 -- -- -- 102 21 99 %   20 0400 (!) 153/93 99.8 °F (37.7 °C) Oral 113 24 100 %   20 0330 -- -- -- 112 27 100 %   20 0300 -- -- -- 115 29 100 %   20 0230 -- -- -- 114 24 100 %   20 0200 -- -- -- 112 17 100 %   20 0130 -- -- -- 117 (!) 32 99 %   20 0100 -- -- -- 118 26 100 %   20 0030 -- -- -- 126 -- 100 %   20 0000 (!) 135/93 99.7 °F (37.6 °C) Oral 118 19 100 %   20 2330 (!) 145/87 -- -- 128 21 99 %         Intake/Output Summary (Last 24 hours) at 2020 0707  Last data filed at 8/25/2020 0600  Gross per 24 hour   Intake 573 ml   Output 1625 ml   Net -1052 ml     Date 08/25/20 0000 - 08/25/20 2359   Shift 8941-4132 2342-0643 4317-2856 24 Hour Total   INTAKE   Shift Total(mL/kg)       OUTPUT   Urine(mL/kg/hr) 275   275   Shift Total(mL/kg) 275(4.1)   275(4.1)   Weight (kg) 66.9 66.9 66.9 66.9     Wt Readings from Last 3 Encounters:   08/24/20 147 lb 7.8 oz (66.9 kg)   08/13/20 150 lb (68 kg)   08/10/19 126 lb 15.8 oz (57.6 kg)     Body mass index is 25.32 kg/m². PHYSICAL EXAM:  Constitutional: Appears well, no distress but little anxious  EENT: PERRLA, EOMI, sclera clear, anicteric, oropharynx clear,   Neck: Supple, symmetrical, trachea midline, no adenopathy, thyroid symmetric, no jvd skin normal  Respiratory: clear to auscultation, no wheezes or rales and unlabored breathing. No intercostal tenderness  Cardiovascular: regular rate and rhythm, normal S1, S2, no murmur noted and 2+ pulses throughout  Abdomen: soft, nontender, nondistended, no masses or organomegaly  Neurologic: Awake, alert, oriented to name, place and time.   Sensory and motor examinations are grossly intact   Extremities:  peripheral pulses normal, no pedal edema, no clubbing or cyanosis    MEDICATIONS:  Scheduled Meds:   guaiFENesin  600 mg Oral BID    polyethylene glycol  17 g Oral Daily    nicotine  1 patch Transdermal Daily    QUEtiapine  250 mg Oral BID    enoxaparin  40 mg Subcutaneous Daily    chlorhexidine  15 mL Mouth/Throat BID    famotidine (PEPCID) injection  20 mg Intravenous BID    sodium chloride flush  10 mL Intravenous 2 times per day    sodium chloride flush  10 mL Intravenous 2 times per day    docusate  100 mg Per NG tube BID     Continuous Infusions:   dexmedetomidine 1.4 mcg/kg/hr (08/24/20 1341)    sodium chloride 5 mL/hr at 08/22/20 0848    fentaNYL Stopped (08/24/20 0400)     PRN Meds:   benzocaine-menthol, 1 lozenge, Q2H PRN  LORazepam, 1 mg, Q3H PRN  fentanNYL, 75 mcg, Q1H PRN  albuterol, 2.5 mg, As Directed RT PRN  sodium chloride flush, 10 mL, PRN  sodium chloride flush, 10 mL, PRN  acetaminophen, 650 mg, Q6H PRN    Or  acetaminophen, 650 mg, Q6H PRN  polyethylene glycol, 17 g, Daily PRN  promethazine, 12.5 mg, Q6H PRN    Or  ondansetron, 4 mg, Q6H PRN  artificial tears, , PRN      SUPPORT DEVICES: [] Ventilator [] BIPAP  [x] Nasal Cannula [] Room Air    VENT SETTINGS (Comprehensive) (if applicable):    Vent Information  $Ventilation: Off Vent  Skin Assessment: Clean, dry, & intact  Suction Catheter Diameter: 14  Equipment ID: [id number]  Equipment Changed: HME  Vent Type: Servo i  Vent Mode: (S) PRVC  Vt Ordered: 490 mL  Rate Set: 15 bmp  Pressure Support: (S) 6 cmH20  FiO2 : 30 %  SpO2: 100 %  SpO2/FiO2 ratio: 330  Sensitivity: 4  PEEP/CPAP: 5  I Time/ I Time %: 0.9 s  Humidification Source: HME  Nitric Oxide/Epoprostenol In Use?: No  Additional Respiratory  Assessments  Pulse: 100  Resp: 27  SpO2: 100 %  End Tidal CO2: 38 (%)  Position: Semi-Omer's  Humidification Source: HME  Oral Care Completed?: Yes  Oral Care: Teeth brushed, Suction toothette  Subglottic Suction Done?: Yes  Cuff Pressure (cm H2O): 14 cm H2O  Skin barrier applied: No    ABGs:   Arterial Blood Gas result:  Lab Results   Component Value Date    PHART 7.218 08/14/2020    KAK1UPZ 56.3 08/14/2020    PO2ART 82.3 08/14/2020    YLO6UNZ 22.9 08/14/2020    FJG6GDQ 29 08/19/2020    X7ZQWEZR 93.1 08/14/2020    FIO2 30.0 08/19/2020     Lactic Acid:   Lab Results   Component Value Date    LACTA NOT REPORTED 08/15/2020    LACTA NOT REPORTED 08/15/2020    LACTA NOT REPORTED 08/15/2020         DATA:  Complete Blood Count:   Recent Labs     08/23/20  0526 08/24/20  0515 08/25/20  0549   WBC 8.5 10.3 19.2*   HGB 12.0* 12.2* 13.3   MCV 98.7 104.4* 94.1   * 664* 721*   RBC 3.73* 3.83* 4.22   HCT 36.8* 40.0* 39.7*   MCH 32.2 31.9 31.5   MCHC 32.6 30.5 33.5   RDW 13.2 13.5 13.5   MPV 10.2 10.2 9.5        PT/INR:    Lab Results   Component Value Date    PROTIME 14.2 08/13/2020    INR 1.1 08/13/2020     PTT:    Lab Results   Component Value Date    APTT 53.2 08/15/2020       Basal Metabolic Profile:   Recent Labs     08/23/20  0526 08/24/20  0515 08/25/20  0549    140 140   K 4.4 4.4 4.1   BUN 17 21* 17   CREATININE 0.67* 0.75 0.84    103 100   CO2 24 23 25      Magnesium:   Lab Results   Component Value Date    MG 2.2 08/25/2020    MG 2.5 08/24/2020    MG 2.4 08/23/2020     Phosphorus:   Lab Results   Component Value Date    PHOS 4.0 08/25/2020    PHOS 4.5 08/24/2020    PHOS 4.7 08/23/2020     S. Calcium:  Recent Labs     08/25/20  0549   CALCIUM 9.6     S. Ionized Calcium:No results for input(s): IONCA in the last 72 hours. Urinalysis:   Lab Results   Component Value Date    NITRU NEGATIVE 08/14/2020    COLORU YELLOW 08/14/2020    PHUR 6.0 08/14/2020    WBCUA 0 TO 2 08/14/2020    RBCUA 2 TO 5 08/14/2020    MUCUS NOT REPORTED 08/14/2020    TRICHOMONAS NOT REPORTED 08/14/2020    YEAST NOT REPORTED 08/14/2020    BACTERIA NOT REPORTED 08/14/2020    SPECGRAV 1.014 08/14/2020    LEUKOCYTESUR NEGATIVE 08/14/2020    UROBILINOGEN Normal 08/14/2020    BILIRUBINUR NEGATIVE 08/14/2020    GLUCOSEU 2+ 08/14/2020    KETUA NEGATIVE 08/14/2020    AMORPHOUS 1+ 08/14/2020       CARDIAC ENZYMES: No results for input(s): CKMB, CKMBINDEX, TROPONINI in the last 72 hours. Invalid input(s): CKTOTAL;3  BNP: No results for input(s): BNP in the last 72 hours. LFTS  Recent Labs     08/23/20  0526 08/24/20  0515 08/25/20  0549   ALKPHOS 241* 450* 412*   ALT 70* 133* 122*   AST 42* 134* 94*   BILITOT 0.27* 0.30 0.47   LABALBU 2.7* 2.9* 4.1       AMYLASE/LIPASE/AMMONIA  No results for input(s): AMYLASE, LIPASE, AMMONIA in the last 72 hours.     Last 3 Blood Glucose:   Recent Labs     08/23/20  0526 08/24/20  0515 08/25/20  0549   GLUCOSE 93 111* 127*      HgBA1c:  No results found for: LABA1C      TSH:  No results found for: TSH  ANEMIA STUDIES  No results for input(s): LABIRON, TIBC, FERRITIN, [id number], FOLATE, OCCULTBLD in the last 72 hours. Cultures during this admission:     Blood cultures:                 [] None drawn      [] Negative             []  Positive (Details:  )  Urine Culture:                   [] None drawn      [] Negative             []  Positive (Details:  )  Sputum Culture:               [] None drawn       [] Negative             []  Positive (Details:  )   Endotracheal aspirate:     [] None drawn       [] Negative             []  Positive (Details:  )     Cultures were positive for gram-negative Serratia subsequent blood culture showed no growth      Chest Xray (8/25/2020): Chest x-ray resolved pulmonary infiltrates from the previous chest x-ray    ASSESSMENT:   Active Problems:    Acute respiratory failure with hypoxia ([address]    Sepsis due to Gram-negative organism with septic shock (HCC)    Intravenous drug abuse ([facility] [address]    Toxic metabolic encephalopathy  Resolved Problems:    * No resolved hospital problems. *        PLAN:     Patient is extubated saturating well on nasal cannula. He is less anxious today    1. Acute hypoxic respiratory failure secondary to recreational drug abuse    --Status resolved. --patient is extubated and is breathing comfortably on nasal cannula  --Repeat x-ray showed normal findings. 2. Suspected Cystitis    --WBC count 19.2, Pt afebrile temperature of 99 Fahrenheit. --Patient removed the Sibley by himself. 3.  Anxiety    --History of Xanax intake. --Has underlying history of auditory hallucinations  --Psychiatry was consulted getting further recommendations. PT/OT :    DVT Prophylaxis: Enoxaparin 40 mg subcutaneous  Stress ulcer: Famotidine 20mg po. Disposition: Stays in the ICU with possible stepdown or discharge today or tomorrow. Jeane Gold M.D.              Department of Internal Medicine/ Critical care  [facility] 8/25/2020, 7:07 AM     Attending Physician Statement  I have discussed the care of Monster Rush, including pertinent history and exam findings with the resident. I have reviewed the key elements of all parts of the encounter with the resident. I have seen and examined the patient with the resident. I agree with the assessment and plan and status of the problem list as documented. I see the patient during my round today, I have reviewed the chart, lab seen and medications reviewed overnight events noted and EKG seen. He was extubated yesterday post extubation he did fairly well except initially when he was agitated and restless overnight no significant agitation or delirium was reported and when I saw him this morning he was alert and awake he was on nasal cannula he had intermittent tachypnea look comfortable and maintaining saturation on nasal cannula  He had QTC progression on EKG this morning while he was on Seroquel, Seroquel is on hold. He was having intermittent tachycardia and also sweating and signs of withdrawal he has been off fentanyl drip since yesterday. Repeat EKG showed QTC improved. Seroquel has been discontinued. Patient has been evaluated by psychiatry. Although he did not have any fever spike but his WBC count increased to 19 will get blood cultures and urine analysis now, he had recently Serratia bacteremia because of that was not  He was treated with 6 days of antibiotic in ICU we will follow-up cultures, start him on Rocephin and follow-up WBC count and temperature. Encourage ambulation incentive spirometry deep breathing and cough. Add oxycodone 10 mg every 6 hours around-the-clock as he is getting frequent fentanyl pushes. Transfer to floor with medicine team and critical care team will sign off once on the floor      Discussed with nursing staff, treatment and plan discussed.   Discussed with respiratory therapist.    Total critical care time caring for this patient with life threatening, unstable organ failure, including direct patient contact, management of life support systems, review of data including imaging and labs, discussions with other team members and physicians at least 27  Min so far today, excluding procedures. Please note that this chart was generated using voice recognition Dragon dictation software. Although every effort was made to ensure the accuracy of this automated transcription, some errors in transcription may have occurred.      Chintan Stone MD  8/25/2020 3:13 PM

## 2020-08-25 NOTE — PLAN OF CARE
Problem: OXYGENATION/RESPIRATORY FUNCTION  Goal: Patient will maintain patent airway  Outcome: Ongoing  Goal: Patient will achieve/maintain normal respiratory rate/effort  Description: Respiratory rate and effort will be within normal limits for the patient  Outcome: Ongoing     Problem: MECHANICAL VENTILATION  Goal: Patient will maintain patent airway  Outcome: Ongoing  Goal: Oral health is maintained or improved  Outcome: Ongoing  Goal: Ability to express needs and understand communication  Outcome: Ongoing  Goal: Mobility/activity is maintained at optimum level for patient  Outcome: Ongoing     Problem: SKIN INTEGRITY  Goal: Skin integrity is maintained or improved  Outcome: Ongoing     Problem: NUTRITION  Goal: Nutritional status is improving  Outcome: Ongoing     Problem: Skin Integrity:  Goal: Will show no infection signs and symptoms  Description: Will show no infection signs and symptoms  Outcome: Ongoing  Goal: Absence of new skin breakdown  Description: Absence of new skin breakdown  Outcome: Ongoing     Problem: Falls - Risk of:  Goal: Will remain free from falls  Description: Will remain free from falls  Outcome: Ongoing  Goal: Absence of physical injury  Description: Absence of physical injury  Outcome: Ongoing     Problem: Nutrition  Goal: Optimal nutrition therapy  Description: Nutrition Problem #1: Inadequate oral intake  Intervention: Food and/or Nutrient Delivery: Start Tube Feeding  Nutritional Goals: provide greater than 75% of nutrition needs     Outcome: Ongoing

## 2020-08-25 NOTE — VIRTUAL HEALTH
Consults  Patient Location:  [address] Stepdown    Provider Location ([facility]):   [place], [place]  Department of Psychiatry  Consult Service  Advanced Practice Nurse Psychiatric Assessment      Thank you very much for allowing us to participate in the care of this patient. Reason for Consult: Patient has a history of auditory hallucinations and currently on Seroquel 200 mg twice daily. History obtained from:  patient, electronic medical record    HISTORY OF PRESENT ILLNESS:          The patient is a 38 y.o. male who is admitted medically for treatment of acute hypoxic respiratory failure suspected to be due to recreational drug use. Bystanders called EMS and perform CPR. In the ED, patient's O2 saturation was in the 70s and he was intubated. He also received Narcan. Prior to intubation he was unable to state what drugs he may have taken. He was started on antibiotics for possible aspiration. Attempts to extubate patient were unsuccessful, as he became agitated. He was started on Seroquel that was increased slowly from 25 mg twice daily to 50 mg twice daily and 100 mg twice daily. He also received Haldol for acute agitation. Patient was extubated 8/24, and Seroquel increased to 200 mg twice daily. She is atient was reportedly taking Seroquel 250 mg twice daily prior to admission. Notably, patient's QTc is elongated at 565. When interviewed, patient states he recalls fighting with his girlfriend and states he was using cocaine and ecstasy, but not together. He was also using marijuana. He is unsure what may have happened, but admits it was likely drug related. He states he has not been treated by a psychiatrist, rather buying Seroquel off the street. He was under the impression that Seroquel was used for pain, and states it helped him sleep. Patient was he indicates that he was also using various opiates intermittently.   Patient does state that he was MG/24HR 1 patch, 1 patch, Transdermal, Daily  LORazepam (ATIVAN) tablet 1 mg, 1 mg, Oral, Q3H PRN  fentaNYL (SUBLIMAZE) injection 75 mcg, 75 mcg, Intravenous, Q1H PRN  enoxaparin (LOVENOX) injection 40 mg, 40 mg, Subcutaneous, Daily  0.9 % sodium chloride infusion, , Intravenous, Continuous  chlorhexidine (PERIDEX) 0.12 % solution 15 mL, 15 mL, Mouth/Throat, BID  albuterol (PROVENTIL) nebulizer solution 2.5 mg, 2.5 mg, Nebulization, As Directed RT PRN  sodium chloride flush 0.9 % injection 10 mL, 10 mL, Intravenous, 2 times per day  sodium chloride flush 0.9 % injection 10 mL, 10 mL, Intravenous, PRN  sodium chloride flush 0.9 % injection 10 mL, 10 mL, Intravenous, 2 times per day  sodium chloride flush 0.9 % injection 10 mL, 10 mL, Intravenous, PRN  acetaminophen (TYLENOL) tablet 650 mg, 650 mg, Oral, Q6H PRN **OR** acetaminophen (TYLENOL) suppository 650 mg, 650 mg, Rectal, Q6H PRN  polyethylene glycol (GLYCOLAX) packet 17 g, 17 g, Oral, Daily PRN  promethazine (PHENERGAN) tablet 12.5 mg, 12.5 mg, Oral, Q6H PRN **OR** ondansetron (ZOFRAN) injection 4 mg, 4 mg, Intravenous, Q6H PRN  lubrifresh P.M. (artificial tears) ophthalmic ointment, , Both Eyes, PRN       PAST PSYCHIATRIC HISTORY: Patient states he was admitted to [facility] in the past starting as a teenager for what sounds like bipolar symptoms. He has never had a suicide attempt, but states he has had suicidal thoughts in the past in response to the death of his grandparents and aunt. He has had no recent psychiatric treatment, but was buying Seroquel on the streets.     Past psychiatric medications include:   Effexor    Adverse reactions from psychotropic medications:  Denies    Lifetime Psychiatric Review of Systems:     Carole: endorses  Panic: denies  Phobia: denies  Hallucinations: endorses during drug use  Delusions: denies     Past Medical History:        Diagnosis Date    Bronchitis     in the past    Difficult intubation     ? per mother at age 6, states \" flap very big \"    Heartburn     diet controlled    Inguinal hernia     left    Snores        Past Surgical History:        Procedure Laterality Date    INGUINAL HERNIA REPAIR Left 04/13/2018    robotic with mesh    AK LAP,INGUINAL HERNIA REPR,INITIAL Left 4/13/2018    LAPAROSCOPIC XI ROBOTIC LEFT INGUINAL HERNIA REPAIR WITH MESH performed by Harsha Sam IV, DO at [address] [place] [address]      with adenoidectomy as child at age 6       Allergies: Patient has no known allergies. Social History: Patient states he has 3 children who are in the custody of his mother. He admits to long history of polysubstance abuse. He had recently started a job prior to this admission. Family Psychiatric History:  Patient unclear regarding family history of psychiatric illness.     Family History:       Problem Relation Age of Onset    No Known Problems Mother     No Known Problems Father     No Known Problems Sister     No Known Problems Brother     Cancer Maternal Grandmother     No Known Problems Maternal Grandfather     Diabetes Paternal Grandmother     Diabetes Paternal Grandfather          Physical  BP (!) 143/99   Pulse 113   Temp 99.5 °F (37.5 °C) (Oral)   Resp 25   Ht 5' 4\" (1.626 m)   Wt 147 lb 7.8 oz (66.9 kg)   SpO2 100%   BMI 25.32 kg/m²     MENTAL STATUS EXAM:  Level of consciousness: Awake, alert   Appearance:  Sitting in bed, hospital attire  Behavior/Motor: No abnormalities  Attitude toward examiner:  Cooperative, attentive  Speech:  Spontaneous, normal rate, volume, tone  Mood:  \"Great\"  Affect:  Euthymic  Thought processes:  Linear, goal-directed  Thought content:  Homocidal ideation -denies  Suicidal Ideation:  Denies suicidal ideation  Delusions:  No delusions noted  Perceptual Disturbance: Denies auditory or visual hallucinations  Cognition:  Oriented to person, place, time  Memory: Intact  Insight & Judgement: Fair  Medication side effects: Denies    DSM V DIAGNOSIS:     Suspect Bipolar I Disorder, Mixed  Cocaine use disorder  Opiate use disorder  Polysubstance Abuse     General Medical Condition:      Patient Active Problem List   Diagnosis Code    Respiratory depression R06.89    Acute respiratory failure with hypoxia ([facility]) J96.01    Sepsis due to Gram-negative organism with septic shock ([facility]) A41.50, R65.21    Intravenous drug abuse ([facility] [address] [facility] F19.10    Toxic metabolic encephalopathy R50       Recommendations:    1. Recommend limiting Seroquel to 200 mg at bedtime, for mood stabilization, given patient's elevated QTc.     2. Please provide patient with contact information for [facility] for outpatient psychiatric treatment. 3. Patient is not determined to be a risk for self harm. Thank you very much for allowing us to participate in the care of this patient. Time spent > 60 min. Provider's Signature:  Electronically signed by KEILA Subramanian CNS on 8/25/2020 at 6:54 PM.      This virtual visit was conducted via interactive/real-time audio/video.

## 2020-08-25 NOTE — PROGRESS NOTES
Patient received from ICU. Vitals taken. Tachycardia 110s, 144/117, Tachypneic RR 20s. See doc flowsheet and transfer navigator for details. POC and education reviewed with patient. Call light within reach, and pt educated on its use. Bed in lowest position, and locked. Side rails up x 2. Denied further questions or needs at this time. Will continue to monitor.   Yandy Christie RN

## 2020-08-25 NOTE — PROGRESS NOTES
Critical care team - Resident sign-out to medicine service      Date and time: 8/25/2020 4:36 PM  Patient's name:  Zee Batista Record Number: [id number]  Patient's account/billing number: [de-identified]  Patient's YOB: 1985  Age: 38 y.o. Date of Admission: 8/14/2020  5:18 AM  Length of stay during current admission: 11    Primary Care Physician: No primary care provider on file. Code Status: Full Code    Mode of physician to physician communication:        [] Via telephone   [] In person     Date and time of sign-out: 8/25/2020 4:36 PM    Accepting Internal Medicine resident: Dr. Cindi Garcia    Accepting Medicine team: IM Team Medicine    Accepting team's attending: Dr. Teodora Schulte    Patient's current ICU Bed:  127     Patient's assigned bed on floor:  [id number]        [] Med-Surg Monitored [x] Step-down       [] Psychiatry ICU       [] Psych floor     Reason for ICU admission:     Acute Hypoxic Respiratory failure secondary to Recreational drug abuse    ICU course summary:     Hx IVDA,From [facility] AMS hypoxic, elevated trops, levo. Acute hypoxic respiratory failure, with Overdose, rayne pre-renal  8/14: paralyzed, stated on empiric antibiotcs fo presumed aspiration  8/15: stop paralytic, stop versed drip, growing serratia marscesans in blood cultures antibiotics changed to zosyn  8/18: Added Qutiapine 25 mg BID, Spontaneous Breathing Ttrial (SBT)  8/19: Sbt not tolerated still agitated , Quitiapine increased to 50 mg bid. 08/20 : Patient initially showed improvement with daily Seroquel 50 mg twice daily . SBT overnight unsuccessful patient agitated and was given IM olanzapine as he is pulling out the tubes. 08/21: Agitated and was restrained. SBT not done overnight. Went back up on his sedation. Over night he was agitated and irritated and was danger to self. Propofol was started to sedate and Seroquil increased to 100mg BID and Haloperidol was given for acute agitation.    08/24 : Pt is extubated and is saturating well still delirious with all the weaning sedative drugs     Procedures during patient's ICU stay:     ECHO     Current Vitals:     BP (!) 135/92   Pulse 104   Temp 99.3 °F (37.4 °C) (Oral)   Resp 30   Ht 5' 4\" (1.626 m)   Wt 147 lb 7.8 oz (66.9 kg)   SpO2 100%   BMI 25.32 kg/m²       Cultures:     Blood cultures:                 [] None drawn      [] Negative             [x]  Positive (Details: Serratia marscesans )  Urine Culture:                   [] None drawn      [] Negative             []  Positive (Details:  )  Sputum Culture:               [] None drawn       [] Negative             []  Positive (Details:  )   Endotracheal aspirate:     [] None drawn       [] Negative             []  Positive (Details:  )       Consults:     1. Cardiology    Assessment:     Patient Active Problem List    Diagnosis Date Noted    Sepsis due to Gram-negative organism with septic shock ([facility] [address] [facility] 08/17/2020    Intravenous drug abuse ([facility] [address] [facility]     Toxic metabolic encephalopathy     Acute respiratory failure with hypoxia ([facility] [address] 08/14/2020    Respiratory depression 08/10/2019         Recommended Follow-up:     1. Acute hypoxic respiratory failure secondary to recreational drug abuse     --Status resolved. --patient is extubated and is breathing comfortably on nasal cannula  --Repeat x-ray showed normal findings.     2. Suspected Cystitis     --WBC count 19.2, Pt afebrile temperature of 99 Fahrenheit. --Patient removed the Sibley by himself. --F/U Blood culture      3. Anxiety     --History of Xanax intake. --Has underlying history of auditory hallucinations  --Psychiatry was consulted getting further recommendations.  --WA protocol, monitor for DT     PT/OT :     DVT Prophylaxis: Enoxaparin 40 mg subcutaneous  Stress ulcer: Famotidine 20mg po. Above mentioned assessment and plan was discussed by me with the admitting medicine resident.  The medicine team assigned to the patient by medicine admitting resident will be following up the patient from now onwards on the floor.        Sulma Mejía MD  Emergency Medicine Resident  [address]  8/25/2020, 4:36 PM EDT

## 2020-08-25 NOTE — PROGRESS NOTES
Physical Therapy    [address]  Initial Assessment    NAME: Niesha Whatley  : 1985  MRN: [id number]    Date of Service: 2020  No chief complaint on file. Niesha Whatley is a 38 y.o. Patient arrives as transfer from [facility] in [facility] where he was intubated due to suspected hypoxic respiratory failure secondary to drugs. T history is obtained from chart review. Patient has been feeling unwell for several days of chest pain shortness of breath with generalized malaise.   -from H&P,     Discharge Recommendations:  Patient would benefit from continued therapy after discharge        Assessment   Body structures, Functions, Activity limitations: Decreased functional mobility ; Decreased balance;Decreased strength;Decreased safe awareness;Decreased sensation  Assessment: Pt ambulated 50 ft x2 w/ min A in RW with decreased balance and impulsiveness. Pt presents with decreased balance and functional mobility. Pt would benefit from continued therapy. Pt should not return to home situation at this time. Specific instructions for Next Treatment: Be careful, give max cueing to keep pt impulsiveness under control  Prognosis: Good  Decision Making: Medium Complexity  PT Education: Goals;PT Role;Plan of Care;General Safety  Activity Tolerance  Activity Tolerance: Patient Tolerated treatment well       Patient Diagnosis(es): The encounter diagnosis was Acute respiratory failure with hypoxia ([address] has a past medical history of Bronchitis, Difficult intubation, Heartburn, Inguinal hernia, and Snores. has a past surgical history that includes Tonsillectomy; Inguinal hernia repair (Left, 2018); and pr lap,inguinal hernia repr,initial (Left, 2018).     Restrictions  Restrictions/Precautions  Restrictions/Precautions: General Precautions  Required Braces or Orthoses?: No  Position Activity Restriction  Other position/activity restrictions: Sitter in room  Vision/Hearing  Vision: Within Functional Limits  Hearing: Within functional limits     Subjective  General  Patient assessed for rehabilitation services?: Yes  Follows Commands: Within Functional Limits  Subjective  Subjective: Pt and RN agreeable to therapy. Pt found supine in bed. Pt very impulsive and uncooperative. Pain Screening  Patient Currently in Pain: Denies  Vital Signs  Patient Currently in Pain: Denies       Orientation  Orientation  Overall Orientation Status: Impaired  Orientation Level: Disoriented to time;Oriented to place;Oriented to person  Social/Functional History  Social/Functional History  Lives With: Family(mom, dad, and others)  Type of Home: House  Home Layout: Two level, Able to Live on Main level with bedroom/bathroom, Performs ADL's on one level  Home Access: Stairs to enter with rails  Entrance Stairs - Number of Steps: 4  Entrance Stairs - Rails: Right  Bathroom Shower/Tub: Walk-in shower  Home Equipment: (No DME)  ADL Assistance: Independent  Homemaking Assistance: Independent  Homemaking Responsibilities: Yes  Meal Prep Responsibility: Primary  Laundry Responsibility: Primary  Cleaning Responsibility: Primary  Shopping Responsibility: Primary  Ambulation Assistance: Independent  Transfer Assistance: Independent  Active : Yes  Occupation: Full time employment  Type of occupation: [employer]  Additional Comments: Pt heavily sedated, pt reports \"being all doped up\", questionable historian at this time. Cognition   Cognition  Overall Cognitive Status: Exceptions  Arousal/Alertness: Appropriate responses to stimuli  Following Commands: Follows one step commands with increased time; Follows one step commands with repetition  Attention Span: Difficulty attending to directions; Difficulty dividing attention  Memory: Decreased recall of precautions;Decreased recall of recent events  Safety Judgement: Decreased awareness of need for assistance;Decreased awareness of need for safety  Problem Solving: Assistance required to generate solutions;Assistance required to implement solutions;Assistance required to correct errors made;Assistance required to identify errors made;Decreased awareness of errors  Insights: Decreased awareness of deficits  Initiation: Requires cues for some  Sequencing: Requires cues for some    Objective     Observation/Palpation  Posture: Good    AROM RLE (degrees)  RLE AROM: WFL  AROM LLE (degrees)  LLE AROM : WFL  AROM RUE (degrees)  RUE AROM : WFL  AROM LUE (degrees)  LUE AROM : WFL  Strength RLE  Strength RLE: WFL  Strength LLE  Strength LLE: WFL  Strength RUE  Strength RUE: WFL  Strength LUE  Strength LUE: WFL  Motor Control  Gross Motor?: WFL  Sensation  Overall Sensation Status: Impaired(Pt reports numbness in arms d/t medication)  Bed mobility  Bridging: Stand by assistance  Rolling to Left: Stand by assistance  Rolling to Right: Stand by assistance  Supine to Sit: Stand by assistance  Sit to Supine: Stand by assistance  Scooting: Stand by assistance  Comment: SBA for safety, pt impulsive and tried to get out of bed multiple times  Transfers  Sit to Stand: Contact guard assistance  Stand to sit: Contact guard assistance  Comment: CGA for safety, pt unsteady on feet  Ambulation  Ambulation?: Yes  Ambulation 1  Surface: level tile  Device: Rolling Walker  Assistance: Minimal assistance  Quality of Gait: Scissoring gait, needed verbal cueing to correct  Gait Deviations: Slow Dianna;Staggers; Shuffles;Decreased step length;Decreased step height  Distance: 50 ft x2  Comments: Pt extremely impulsive, needed max verbal cues to stay inside walker, keep head up. Very unsteady on feet, swaying laterally.  Kept running walker into objects on both sides     Balance  Posture: Good  Sitting - Static: Fair;+  Sitting - Dynamic: Fair;+  Standing - Static: Fair(Assessed in RW)  Standing - Dynamic: Fair;-        Plan   Plan  Times per week: 5x/week  Times per day: Daily  Specific instructions for Next Treatment: Be careful, give max cueing to keep pt impulsiveness under control  Current Treatment Recommendations: Transfer Training, Endurance Training, Balance Training, Gait Training, Stair training, Functional Mobility Training, Patient/Caregiver Education & Training, Safety Education & Training, Home Exercise Program  Safety Devices  Type of devices: Patient at risk for falls, Bed alarm in place, Left in bed, Sitter present, Nurse notified, Gait belt, Call light within reach      AM-PAC Score     [address] [address] [place] without Stair Climbing Raw Score : 15 (08/25/20 1211)  AM-PAC Inpatient without Stair Climbing T-Scale Score : 43.03 (08/25/20 1211)  Mobility Inpatient CMS 0-100% Score: 47.43 (08/25/20 1211)  Mobility Inpatient without Stair CMS G-Code Modifier : CK (08/25/20 1211)       Goals  Short term goals  Time Frame for Short term goals: 8 visits  Short term goal 1: Pt to perform independent bed mobility  Short term goal 2: Pt to perform independent transfers  Short term goal 3: Pt to display good standing balance  Short term goal 4: Pt to ambulate independently for 300 ft  Short term goal 5: Pt to perform 5 stairs independently  Patient Goals   Patient goals : Pt wants to return home       Therapy Time   Individual Concurrent Group Co-treatment   Time In 0955         Time Out 1013         Minutes 18         Timed Code Treatment Minutes: 10 Minutes       Elvis Saba This treatment/evaluation completed by signing SPT. Signing PT agrees with treatment and documentation.

## 2020-08-25 NOTE — PLAN OF CARE
Problem: OXYGENATION/RESPIRATORY FUNCTION  Goal: Patient will maintain patent airway  Outcome: Ongoing  Goal: Patient will achieve/maintain normal respiratory rate/effort  Description: Respiratory rate and effort will be within normal limits for the patient  Outcome: Ongoing     Problem: MECHANICAL VENTILATION  Goal: Patient will maintain patent airway  Outcome: Ongoing  Goal: Oral health is maintained or improved  Outcome: Ongoing  Goal: Ability to express needs and understand communication  Outcome: Ongoing  Goal: Mobility/activity is maintained at optimum level for patient  Outcome: Ongoing     Problem: SKIN INTEGRITY  Goal: Skin integrity is maintained or improved  Outcome: Ongoing     Problem: NUTRITION  Goal: Nutritional status is improving  Outcome: Ongoing     Problem: Skin Integrity:  Goal: Will show no infection signs and symptoms  Description: Will show no infection signs and symptoms  Outcome: Ongoing  Goal: Absence of new skin breakdown  Description: Absence of new skin breakdown  Outcome: Ongoing     Problem: Falls - Risk of:  Goal: Will remain free from falls  Description: Will remain free from falls  Outcome: Ongoing  Goal: Absence of physical injury  Description: Absence of physical injury  Outcome: Ongoing     Problem: Nutrition  Goal: Optimal nutrition therapy  Description: Nutrition Problem #1: Inadequate oral intake  Intervention: Food and/or Nutrient Delivery: Start Tube Feeding  Nutritional Goals: provide greater than 75% of nutrition needs     Outcome: Ongoing     Problem: Pain:  Goal: Pain level will decrease  Description: Pain level will decrease  Outcome: Ongoing  Goal: Control of acute pain  Description: Control of acute pain  Outcome: Ongoing  Goal: Control of chronic pain  Description: Control of chronic pain  Outcome: Ongoing

## 2020-08-26 VITALS
SYSTOLIC BLOOD PRESSURE: 127 MMHG | OXYGEN SATURATION: 100 % | HEART RATE: 116 BPM | TEMPERATURE: 98.9 F | DIASTOLIC BLOOD PRESSURE: 92 MMHG | HEIGHT: 64 IN | WEIGHT: 147.49 LBS | RESPIRATION RATE: 17 BRPM | BODY MASS INDEX: 25.18 KG/M2

## 2020-08-26 LAB
ABSOLUTE EOS #: <0.03 K/UL (ref 0–0.44)
ABSOLUTE IMMATURE GRANULOCYTE: 0.11 K/UL (ref 0–0.3)
ABSOLUTE LYMPH #: 1.84 K/UL (ref 1.1–3.7)
ABSOLUTE MONO #: 1.27 K/UL (ref 0.1–1.2)
ANION GAP SERPL CALCULATED.3IONS-SCNC: 17 MMOL/L (ref 9–17)
BASOPHILS # BLD: 0 % (ref 0–2)
BASOPHILS ABSOLUTE: 0.07 K/UL (ref 0–0.2)
BUN BLDV-MCNC: 24 MG/DL (ref 6–20)
BUN/CREAT BLD: ABNORMAL (ref 9–20)
CALCIUM SERPL-MCNC: 9.6 MG/DL (ref 8.6–10.4)
CHLORIDE BLD-SCNC: 99 MMOL/L (ref 98–107)
CO2: 24 MMOL/L (ref 20–31)
CREAT SERPL-MCNC: 0.87 MG/DL (ref 0.7–1.2)
CULTURE: NORMAL
DIFFERENTIAL TYPE: ABNORMAL
EKG ATRIAL RATE: 103 BPM
EKG ATRIAL RATE: 99 BPM
EKG P AXIS: 65 DEGREES
EKG P AXIS: 68 DEGREES
EKG P-R INTERVAL: 118 MS
EKG P-R INTERVAL: 126 MS
EKG Q-T INTERVAL: 340 MS
EKG Q-T INTERVAL: 346 MS
EKG QRS DURATION: 100 MS
EKG QRS DURATION: 96 MS
EKG QTC CALCULATION (BAZETT): 444 MS
EKG QTC CALCULATION (BAZETT): 445 MS
EKG R AXIS: -40 DEGREES
EKG R AXIS: -42 DEGREES
EKG T AXIS: 25 DEGREES
EKG T AXIS: 30 DEGREES
EKG VENTRICULAR RATE: 103 BPM
EKG VENTRICULAR RATE: 99 BPM
EOSINOPHILS RELATIVE PERCENT: 0 % (ref 1–4)
GFR AFRICAN AMERICAN: >60 ML/MIN
GFR NON-AFRICAN AMERICAN: >60 ML/MIN
GFR SERPL CREATININE-BSD FRML MDRD: ABNORMAL ML/MIN/{1.73_M2}
GFR SERPL CREATININE-BSD FRML MDRD: ABNORMAL ML/MIN/{1.73_M2}
GLUCOSE BLD-MCNC: 144 MG/DL (ref 70–99)
HCT VFR BLD CALC: 43 % (ref 40.7–50.3)
HEMOGLOBIN: 14 G/DL (ref 13–17)
HIV AG/AB: NONREACTIVE
IMMATURE GRANULOCYTES: 1 %
LYMPHOCYTES # BLD: 10 % (ref 24–43)
Lab: NORMAL
MCH RBC QN AUTO: 31.5 PG (ref 25.2–33.5)
MCHC RBC AUTO-ENTMCNC: 32.6 G/DL (ref 28.4–34.8)
MCV RBC AUTO: 96.6 FL (ref 82.6–102.9)
MONOCYTES # BLD: 7 % (ref 3–12)
NRBC AUTOMATED: 0 PER 100 WBC
PDW BLD-RTO: 13.7 % (ref 11.8–14.4)
PLATELET # BLD: 895 K/UL (ref 138–453)
PLATELET ESTIMATE: ABNORMAL
PMV BLD AUTO: 9.5 FL (ref 8.1–13.5)
POTASSIUM SERPL-SCNC: 3.9 MMOL/L (ref 3.7–5.3)
RBC # BLD: 4.45 M/UL (ref 4.21–5.77)
RBC # BLD: ABNORMAL 10*6/UL
SEG NEUTROPHILS: 82 % (ref 36–65)
SEGMENTED NEUTROPHILS ABSOLUTE COUNT: 16.1 K/UL (ref 1.5–8.1)
SODIUM BLD-SCNC: 140 MMOL/L (ref 135–144)
SPECIMEN DESCRIPTION: NORMAL
T. PALLIDUM, IGG: NONREACTIVE
WBC # BLD: 19.4 K/UL (ref 3.5–11.3)
WBC # BLD: ABNORMAL 10*3/UL

## 2020-08-26 PROCEDURE — 36415 COLL VENOUS BLD VENIPUNCTURE: CPT

## 2020-08-26 PROCEDURE — 97116 GAIT TRAINING THERAPY: CPT

## 2020-08-26 PROCEDURE — 6360000002 HC RX W HCPCS: Performed by: STUDENT IN AN ORGANIZED HEALTH CARE EDUCATION/TRAINING PROGRAM

## 2020-08-26 PROCEDURE — 93005 ELECTROCARDIOGRAM TRACING: CPT | Performed by: STUDENT IN AN ORGANIZED HEALTH CARE EDUCATION/TRAINING PROGRAM

## 2020-08-26 PROCEDURE — 80048 BASIC METABOLIC PNL TOTAL CA: CPT

## 2020-08-26 PROCEDURE — 6370000000 HC RX 637 (ALT 250 FOR IP): Performed by: STUDENT IN AN ORGANIZED HEALTH CARE EDUCATION/TRAINING PROGRAM

## 2020-08-26 PROCEDURE — 93010 ELECTROCARDIOGRAM REPORT: CPT | Performed by: INTERNAL MEDICINE

## 2020-08-26 PROCEDURE — 99239 HOSP IP/OBS DSCHRG MGMT >30: CPT | Performed by: INTERNAL MEDICINE

## 2020-08-26 PROCEDURE — 85025 COMPLETE CBC W/AUTO DIFF WBC: CPT

## 2020-08-26 PROCEDURE — 2580000003 HC RX 258: Performed by: STUDENT IN AN ORGANIZED HEALTH CARE EDUCATION/TRAINING PROGRAM

## 2020-08-26 PROCEDURE — 87389 HIV-1 AG W/HIV-1&-2 AB AG IA: CPT

## 2020-08-26 PROCEDURE — 86780 TREPONEMA PALLIDUM: CPT

## 2020-08-26 PROCEDURE — 6370000000 HC RX 637 (ALT 250 FOR IP): Performed by: INTERNAL MEDICINE

## 2020-08-26 RX ORDER — QUETIAPINE FUMARATE 200 MG/1
200 TABLET, FILM COATED ORAL NIGHTLY
Qty: 30 TABLET | Refills: 0 | Status: SHIPPED | OUTPATIENT
Start: 2020-08-26 | End: 2020-09-01

## 2020-08-26 RX ORDER — GUAIFENESIN 600 MG/1
600 TABLET, EXTENDED RELEASE ORAL 2 TIMES DAILY
Qty: 30 TABLET | Refills: 0 | Status: SHIPPED | OUTPATIENT
Start: 2020-08-26 | End: 2020-09-01

## 2020-08-26 RX ORDER — LEVOFLOXACIN 250 MG/1
250 TABLET ORAL DAILY
Qty: 10 TABLET | Refills: 0 | Status: SHIPPED | OUTPATIENT
Start: 2020-08-26 | End: 2020-09-05

## 2020-08-26 RX ADMIN — ENOXAPARIN SODIUM 40 MG: 40 INJECTION SUBCUTANEOUS at 07:58

## 2020-08-26 RX ADMIN — FAMOTIDINE 20 MG: 20 TABLET, FILM COATED ORAL at 07:58

## 2020-08-26 RX ADMIN — OXYCODONE HYDROCHLORIDE 10 MG: 5 TABLET ORAL at 03:58

## 2020-08-26 RX ADMIN — SODIUM CHLORIDE, PRESERVATIVE FREE 10 ML: 5 INJECTION INTRAVENOUS at 07:59

## 2020-08-26 RX ADMIN — GUAIFENESIN 600 MG: 600 TABLET, EXTENDED RELEASE ORAL at 07:58

## 2020-08-26 RX ADMIN — OXYCODONE HYDROCHLORIDE 10 MG: 5 TABLET ORAL at 09:40

## 2020-08-26 RX ADMIN — CHLORHEXIDINE GLUCONATE 15 ML: 1.2 RINSE ORAL at 08:01

## 2020-08-26 ASSESSMENT — PAIN SCALES - GENERAL
PAINLEVEL_OUTOF10: 5
PAINLEVEL_OUTOF10: 7
PAINLEVEL_OUTOF10: 5

## 2020-08-26 ASSESSMENT — PAIN DESCRIPTION - LOCATION: LOCATION: GENERALIZED

## 2020-08-26 ASSESSMENT — PAIN DESCRIPTION - PAIN TYPE: TYPE: ACUTE PAIN

## 2020-08-26 ASSESSMENT — PAIN DESCRIPTION - DESCRIPTORS: DESCRIPTORS: ACHING;SORE

## 2020-08-26 NOTE — CARE COORDINATION
Consult received for polysubstance abuse  Met with pt, who has been discharged and ready to leave  Pt stated he is living with his mother  Stated he was working at a [employer] but was unsure if he still had a job  Pt with no insurance - stated his mother is a [employer]  and is helping him get on [employer] stated he has cut back on his substance abuse the past year  Stated he has drank alcohol 2-3x the past year  Stated he does smoke marijuana daily  He reports cocaine use (snorting) 1-2x year, last use day of admission  He reports he had fentanyl unknowingly day of admission  He reports Ecstasy use 1-2x year, last use day of admission  Pt stated he has not had any substance abuse tx, other than as a teenager he was at [employer] for both psych and drug use  Stated he has never been to AA/NA/CA   Pt reports he would like to get linked with [facility] for tx - stated he wants OP tx  - discussed dual dx tx  Provided pt with list of tx resources and the walk in times for [facility] (8a-1p) and advised him to go tomorrow morning - he stated he planned to and stated he was familiar with [facility] program as he used to take his gfriend there  Also provided him with AA/NA/CA directories

## 2020-08-26 NOTE — PLAN OF CARE
Problem: SKIN INTEGRITY  Goal: Skin integrity is maintained or improved  8/26/2020 0521 by Gissel Garcia RN  Outcome: Met This Shift     Problem: NUTRITION  Goal: Nutritional status is improving  8/26/2020 0521 by Gissel Garcia RN  Outcome: Met This Shift     Problem: Skin Integrity:  Goal: Will show no infection signs and symptoms  Description: Will show no infection signs and symptoms  8/26/2020 0521 by Gissel Garcia RN  Outcome: Met This Shift     Problem: Skin Integrity:  Goal: Absence of new skin breakdown  Description: Absence of new skin breakdown  8/26/2020 0521 by Gissel Garcia RN  Outcome: Met This Shift     Problem: Falls - Risk of:  Goal: Will remain free from falls  Description: Will remain free from falls  8/26/2020 0521 by Gissel Garcia RN  Outcome: Met This Shift     Problem: Falls - Risk of:  Goal: Absence of physical injury  Description: Absence of physical injury  8/26/2020 0521 by Gissel Garcia RN  Outcome: Met This Shift     Problem: Pain:  Goal: Pain level will decrease  Description: Pain level will decrease  8/26/2020 0521 by Gissel Garcia RN  Outcome: Met This Shift     Problem: Pain:  Goal: Control of acute pain  Description: Control of acute pain  8/26/2020 0521 by Gissel Garcia RN  Outcome: Met This Shift     Problem: Pain:  Goal: Control of chronic pain  Description: Control of chronic pain  8/26/2020 0521 by Gissel Garcia RN  Outcome: Met This Shift

## 2020-08-26 NOTE — PROGRESS NOTES
[facility]  Internal Medicine Teaching Residency Program  Inpatient Daily Progress Note  ______________________________________________________________________________    Patient: Lidia [place]  YOB: 1985   ZWS:[id number]    Acct: [de-identified]     Room: [facility]-0  Admit date: 8/14/2020  Today's date: 08/26/20  Number of days in the hospital: 12    SUBJECTIVE   Admitting Diagnosis: Acute hypoxic respiratory failure secondary to recreational drug abuse    Pt examined at bedside. Chart & results reviewed. Patient is calm and comfortable lying on the bed. Patient is hemodynamically stable and afebrile. No acute complaints. Vitals /92, heart rate 110, RR 18, saturating 98% on room air  Labs glucose 144, BUN 24, 0.87, WBC 19.4, hemoglobin 14  Blood cultures no growth  Psych recommendations considered. ROS:  Constitutional:  negative for chills, fevers, sweats  Respiratory:  negative for cough, dyspnea on exertion, hemoptysis, shortness of breath, wheezing  Cardiovascular:  negative for chest pain, chest pressure/discomfort, lower extremity edema, palpitations  Gastrointestinal:  negative for abdominal pain, constipation, diarrhea, nausea, vomiting  Neurological:  negative for dizziness, headache  BRIEF HISTORY     Patient is a 78-year-old male with history of IV drug use and admitted in the ED for treatment of acute hypoxic respiratory failure due to recreational use. CPR was performed by the EPS. Patient was intubated in the ICU and sedated with restraints. Patient was extubated 08/24 and Seroquel was increased. Patient stepdown from ICU to the floors on 08/25. Extubated tolerated saturating well on room air. Psych consulted psych recommendations noted.     OBJECTIVE     Vital Signs:  BP (!) 127/92   Pulse 116   Temp 98.9 °F (37.2 °C) (Oral)   Resp 17   Ht 5' 4\" (1.626 m)   Wt 147 lb 7.8 oz (66.9 kg)   SpO2 100%   BMI 25.32 kg/m² Temp (24hrs), Av.6 °F (37.6 °C), Min:98.9 °F (37.2 °C), Max:100.3 °F (37.9 °C)    In: 512   Out: 100 [Urine:100]    Physical Exam:  Constitutional: This is a well developed, well nourished,  38y.o. year old male who is alert, oriented, cooperative and in no apparent distress. Head:normocephalic and atraumatic. EENT:  PERRLA. No conjunctival injections. Neck: Supple without thyromegaly. No elevated JVP. Respiratory: Chest was symmetrical without dullness to percussion. Breath sounds bilaterally were clear to auscultation. Cardiovascular: Regular without murmur, clicks, gallops or rubs. Abdomen: Slightly rounded and soft without organomegaly. Lymphatic: No lymphadenopathy. Musculoskeletal: Normal curvature of the spine. No gross muscle weakness. Extremities:  No lower extremity edema, ulcerations, tenderness, varicosities or erythema. Muscle size, tone and strength are normal.    Skin:  Warm and dry. Good color, turgor and pigmentation. No lesions or scars.   No cyanosis or clubbing  Neurological/Psychiatric: The patient's general behavior, level of consciousness, thought content and emotional status is normal.        Medications:  Scheduled Medications:    guaiFENesin  600 mg Oral BID    [Held by provider] QUEtiapine  200 mg Oral BID    famotidine  20 mg Oral BID    oxyCODONE  10 mg Oral Q6H    cefTRIAXone (ROCEPHIN) IV  1 g Intravenous Q24H    nicotine  1 patch Transdermal Daily    enoxaparin  40 mg Subcutaneous Daily    chlorhexidine  15 mL Mouth/Throat BID    sodium chloride flush  10 mL Intravenous 2 times per day    sodium chloride flush  10 mL Intravenous 2 times per day     Continuous Infusions:    sodium chloride Stopped (20 1254)     PRN Medicationsdocusate sodium, 100 mg, Daily PRN  benzocaine-menthol, 1 lozenge, Q2H PRN  LORazepam, 1 mg, Q3H PRN  fentanNYL, 75 mcg, Q1H PRN  albuterol, 2.5 mg, As Directed RT PRN  sodium chloride flush, 10 mL, PRN  sodium chloride flush, 10 mL, PRN  acetaminophen, 650 mg, Q6H PRN    Or  acetaminophen, 650 mg, Q6H PRN  polyethylene glycol, 17 g, Daily PRN  promethazine, 12.5 mg, Q6H PRN    Or  ondansetron, 4 mg, Q6H PRN  artificial tears, , PRN        Diagnostic Labs:  CBC:   Recent Labs     08/24/20  0515 08/25/20  0549 08/26/20  0610   WBC 10.3 19.2* 19.4*   RBC 3.83* 4.22 4.45   HGB 12.2* 13.3 14.0   HCT 40.0* 39.7* 43.0   .4* 94.1 96.6   RDW 13.5 13.5 13.7   * 721* 895*     BMP:   Recent Labs     08/24/20  0515 08/25/20  0549 08/26/20  0610    140 140   K 4.4 4.1 3.9    100 99   CO2 23 25 24   PHOS 4.5 4.0  --    BUN 21* 17 24*   CREATININE 0.75 0.84 0.87     BNP: No results for input(s): BNP in the last 72 hours. PT/INR: No results for input(s): PROTIME, INR in the last 72 hours. APTT: No results for input(s): APTT in the last 72 hours. CARDIAC ENZYMES: No results for input(s): CKMB, CKMBINDEX, TROPONINI in the last 72 hours. Invalid input(s): CKTOTAL;3  FASTING LIPID PANEL:  Lab Results   Component Value Date    TRIG 208 (H) 08/22/2020     LIVER PROFILE:   Recent Labs     08/24/20  0515 08/25/20  0549   * 94*   * 122*   BILITOT 0.30 0.47   ALKPHOS 450* 412*      MICROBIOLOGY:   Lab Results   Component Value Date/Time    CULTURE NO GROWTH 12 HOURS 08/25/2020 04:24 PM       Imaging:    Xr Chest Portable    Result Date: 8/25/2020  No acute cardiopulmonary findings       ASSESSMENT & PLAN     ASSESSMENT / PLAN:     1.  Acute hypoxic respiratory failure secondary to recreational drug abuse     --Status resolved. --patient is extubated and is breathing comfortably on nasal cannula  --Repeat x-ray showed normal findings.     2. Suspected Cystitis     --WBC count 19.4, Pt afebrile temperature  --Patient removed the [place] by himself.   --Blood culture: No growth   --Levaquin 250 mg p.o. OD for 10 days on discharge     3.  Hypo-kalemia hypophosphatemia and hypomagnesemia.     --Status resolved with potassium of 4.1 phosphorus of 4 and magnesium of 2.2 as of yesterday       Psych consulted yesterday. Psych recommendations noted. Patient will follow-up with [facility] outpatient     DVT Prophylaxis: Enoxaparin 40 mg subcutaneous        Neida Tapia MD  Internal Medicine Resident, PGY-1  [facility];  [facility]  8/26/2020, 10:19 AM

## 2020-08-26 NOTE — PROGRESS NOTES
Physical Therapy  Facility/Department: [address] STEPDOWN  Daily Treatment Note  NAME: Damian Hartman  : 1985  MRN: [id number]    Date of Service: 2020    Discharge Recommendations:  Patient would benefit from continued therapy after discharge   PT Equipment Recommendations  Equipment Needed: No    Assessment   Body structures, Functions, Activity limitations: Decreased functional mobility ; Decreased balance;Decreased strength;Decreased safe awareness;Decreased sensation  Assessment: Pt able to amb 300ft with NO AD requiring Min A for safety d/t decrease balance and poor safety awareness, perfomed 8steps with MIN A, unsteady with no LOB noted throughout. Pt would beneft from continue therapy to return to PLOF. Prognosis: Good  PT Education: Goals;Plan of Care;General Safety;Gait Training  Activity Tolerance  Activity Tolerance: Patient Tolerated treatment well;Patient limited by fatigue     Patient Diagnosis(es): The encounter diagnosis was Acute respiratory failure with hypoxia ([facility] [address] has a past medical history of Bronchitis, Difficult intubation, Heartburn, Inguinal hernia, and Snores. has a past surgical history that includes Tonsillectomy; Inguinal hernia repair (Left, 2018); and pr lap,inguinal hernia repr,initial (Left, 2018). Restrictions  Restrictions/Precautions  Restrictions/Precautions: General Precautions  Required Braces or Orthoses?: No  Position Activity Restriction  Other position/activity restrictions: Sitter in room  Subjective   General  Chart Reviewed: Yes  Response To Previous Treatment: Patient with no complaints from previous session. Family / Caregiver Present: No  Subjective  Subjective: Pt and RN agreeable to therapy. Pt found supine in bed. Pt very impulsive but cooperative. .  Pain Screening  Patient Currently in Pain: No  Vital Signs  Patient Currently in Pain: No       Orientation  Orientation  Orientation Level: Disoriented to time;Oriented to place;Oriented to person  Cognition      Objective   Bed mobility  Supine to Sit: Stand by assistance  Sit to Supine: Stand by assistance  Scooting: Modified independent  Comment: Pt very impuslive and demo poor safety judgement . Transfers  Sit to Stand: Stand by assistance  Stand to sit: Stand by assistance  Comment: pt unsteady on feet with increase swaying strategy to maintain standing balance  Ambulation  Ambulation?: Yes  Ambulation 1  Device: No Device  Quality of Gait: Scissoring gait, needed verbal cueing to correct  Gait Deviations: Decreased step height;Shuffles;Decreased arm swing;Decreased head and trunk rotation;Deviated path  Distance: 300ft  Comments: Pt demo poor safety awareness throughout. distracts easily. Stairs/Curb  Stairs?: Yes  Stairs  # Steps : 8  Rails: Right ascending  Curbs: 6\"  Device: No Device  Assistance: Minimal assistance  Comment: No LOB throughout. Cueing for sequencing and safety.      Balance  Sitting - Static: Good;+  Sitting - Dynamic: Fair;+  Standing - Static: Fair  Standing - Dynamic: Fair;-  Comments: Standing without AD  Exercises  Comments: Pt decline d/t fatigue       Goals  Short term goals  Time Frame for Short term goals: 8 visits  Short term goal 1: Pt to perform independent bed mobility  Short term goal 2: Pt to perform independent transfers  Short term goal 3: Pt to display good standing balance  Short term goal 4: Pt to ambulate independently for 300 ft  Short term goal 5: Pt to perform 5 stairs independently  Patient Goals   Patient goals : Pt wants to return home    Plan    Plan  Times per week: 5x/week  Times per day: Daily  Specific instructions for Next Treatment: Be careful, give max cueing to keep pt impulsiveness under control  Current Treatment Recommendations: Transfer Training, Endurance Training, Balance Training, Gait Training, Stair training, Functional Mobility Training, Patient/Caregiver Education & Training, Safety Education & Training, Home Exercise Program  Safety Devices  Type of devices: Patient at risk for falls, Bed alarm in place, Left in bed, Nurse notified, Gait belt, Call light within reach     Therapy Time   Individual Concurrent Group Co-treatment   Time In 1008         Time Out 1022         Minutes 14         Timed Code Treatment Minutes: Sahra Feliciano 79, PTA

## 2020-08-31 LAB
CULTURE: NORMAL
CULTURE: NORMAL
Lab: NORMAL
Lab: NORMAL
SPECIMEN DESCRIPTION: NORMAL
SPECIMEN DESCRIPTION: NORMAL

## 2020-09-01 ENCOUNTER — OFFICE VISIT (OUTPATIENT)
Dept: INTERNAL MEDICINE | Age: 35
End: 2020-09-01
Payer: MEDICAID

## 2020-09-01 VITALS
HEART RATE: 82 BPM | DIASTOLIC BLOOD PRESSURE: 84 MMHG | BODY MASS INDEX: 19.58 KG/M2 | SYSTOLIC BLOOD PRESSURE: 118 MMHG | HEIGHT: 69 IN | WEIGHT: 132.2 LBS

## 2020-09-01 PROCEDURE — 99203 OFFICE O/P NEW LOW 30 MIN: CPT | Performed by: NURSE PRACTITIONER

## 2020-09-01 PROCEDURE — 99211 OFF/OP EST MAY X REQ PHY/QHP: CPT | Performed by: NURSE PRACTITIONER

## 2020-09-01 RX ORDER — VENLAFAXINE HYDROCHLORIDE 75 MG/1
75 CAPSULE, EXTENDED RELEASE ORAL DAILY
Qty: 30 CAPSULE | Refills: 1 | Status: SHIPPED | OUTPATIENT
Start: 2020-09-01

## 2020-09-01 NOTE — PROGRESS NOTES
2020    Magalie Alonzo (:  1985) is a 38 y.o. male, here for evaluation of the following medical concerns:    Mental Health Problem   The primary symptoms include dysphoric mood. The current episode started more than 1 month ago. This is a recurrent problem. The onset of the illness is precipitated by a stressful event, emotional stress and drug abuse. The degree of incapacity that he is experiencing as a consequence of his illness is moderate. Sequelae of the illness include an inability to work, harmed interpersonal relations and an inability to care for self. Additional symptoms of the illness include anhedonia, unexpected weight change, fatigue, attention impairment, distractible and poor judgment. Additional symptoms of the illness do not include visual change, headaches or abdominal pain. He does not admit to suicidal ideas. He does not have a plan to attempt suicide. He does not contemplate harming himself. He has not already injured self. He does not contemplate injuring another person. He has not already  injured another person. Risk factors that are present for mental illness include a history of mental illness and substance abuse. Extremity Weakness   This is a new problem. The current episode started 1 to 4 weeks ago. The problem occurs constantly. The problem has been unchanged. Associated symptoms include fatigue. Pertinent negatives include no abdominal pain, anorexia, arthralgias, change in bowel habit, chest pain, chills, congestion, coughing, diaphoresis, fever, headaches, joint swelling, myalgias, nausea, neck pain, numbness, rash, sore throat, swollen glands, urinary symptoms, vertigo, visual change, vomiting or weakness. Nothing aggravates the symptoms. He has tried nothing for the symptoms. The treatment provided no relief. Review of Systems   Constitutional: Positive for fatigue and unexpected weight change. Negative for chills, diaphoresis and fever.    HENT: Negative Types: Cigarettes     Last attempt to quit: 2020     Years since quittin.0    Smokeless tobacco: Never Used   Substance and Sexual Activity    Alcohol use: No    Drug use: Not Currently     Types: Marijuana     Comment: None since overdose on 2020    Sexual activity: Not Currently     Birth control/protection: Condom   Lifestyle    Physical activity     Days per week: Not on file     Minutes per session: Not on file    Stress: Not on file   Relationships    Social connections     Talks on phone: Not on file     Gets together: Not on file     Attends [employer] service: Not on file     Active member of club or organization: Not on file     Attends meetings of clubs or organizations: Not on file     Relationship status: Not on file    Intimate partner violence     Fear of current or ex partner: Not on file     Emotionally abused: Not on file     Physically abused: Not on file     Forced sexual activity: Not on file   Other Topics Concern    Not on file   Social History Narrative    Not on file        Family History   Problem Relation Age of Onset    No Known Problems Mother     No Known Problems Father     No Known Problems Sister     No Known Problems Brother     Cancer Maternal Grandmother     No Known Problems Maternal Grandfather     Diabetes Paternal Grandmother     Diabetes Paternal Grandfather        Vitals:    20 0902   BP: 118/84   Site: Right Upper Arm   Position: Sitting   Cuff Size: Medium Adult   Pulse: 82   Weight: 132 lb 3.2 oz (60 kg)   Height: 5' 9\" (1.753 m)     Estimated body mass index is 19.52 kg/m² as calculated from the following:    Height as of this encounter: 5' 9\" (1.753 m). Weight as of this encounter: 132 lb 3.2 oz (60 kg). Physical Exam  Vitals signs and nursing note reviewed. Constitutional:       General: He is not in acute distress. Appearance: Normal appearance. HENT:      Head: Normocephalic and atraumatic.       Right Ear: Tympanic membrane, ear canal and external ear normal.      Left Ear: Tympanic membrane, ear canal and external ear normal.      Nose: Nose normal.      Mouth/Throat:      Mouth: Mucous membranes are moist.      Pharynx: Oropharynx is clear. Eyes:      Extraocular Movements: Extraocular movements intact. Conjunctiva/sclera: Conjunctivae normal.      Pupils: Pupils are equal, round, and reactive to light. Neck:      Musculoskeletal: Normal range of motion and neck supple. Thyroid: No thyromegaly. Cardiovascular:      Rate and Rhythm: Normal rate and regular rhythm. Pulmonary:      Effort: Pulmonary effort is normal.      Breath sounds: Normal breath sounds. Abdominal:      General: Bowel sounds are normal.      Palpations: Abdomen is soft. Musculoskeletal: Normal range of motion. Comments: Generalized weakness. Lymphadenopathy:      Cervical: No cervical adenopathy. Skin:     General: Skin is warm and dry. Neurological:      Mental Status: He is alert and oriented to person, place, and time. Motor: Weakness present. Psychiatric:         Mood and Affect: Mood normal.         Behavior: Behavior normal.         Thought Content: Thought content normal.         Judgment: Judgment normal.         ASSESSMENT/PLAN:  1. Well adult exam  - Comprehensive Metabolic Panel; Future  - Urinalysis Reflex to Culture; Future    2. Bipolar I disorder ([facility] [address]  - encouraged to re establish with mental health care provider  - venlafaxine (EFFEXOR XR) 75 MG extended release capsule; Take 1 capsule by mouth daily  Dispense: 30 capsule; Refill: 1    3. Screening for thyroid disorder  - TSH with Reflex; Future    4. Screening for hyperlipidemia  - Lipid Panel; Future    5. Screening for iron deficiency anemia  - CBC Auto Differential; Future    6. Toxic metabolic encephalopathy  - [facility]    7.  Generalized weakness  - [facility]      Return in about 4 weeks (around 9/29/2020). An  electronic signature was used to authenticate this note.     --KEILA Gustafson - CNP on 9/5/2020 at 3:47 PM

## 2020-09-01 NOTE — PATIENT INSTRUCTIONS
Return To Clinic 10/6/2020 for 1 month follow up in office. After Visit Summary given and reviewed. The medication list included in this document is our record of what you are currently taking, including any changes that were made at today's visit. If you find any differences when compared to your medications at home, or have any questions that were not answered at your visit, please contact the office. It is very important for your care that you keep your appointment. If for some reason you are unable to keep your appointment, it is equally important that you call our office at [phone] to cancel your appointment and reschedule. Failure to do so may result in your termination from our practice. LABORATORY INSTRUCTIONS    Your doctor has ordered blood or urine testing. You can get this testing done at the Lab located on the first floor of the [facility], or at any other [facility]. Please stop at Main Registration, before going to the lab, as you must be registered first.     Please get this lab done before your next visit. You may eat or drink before this test.    Referral for Physical Therapy/Occupational Therapy was sent to [facility]. Patient was given a copy of the referral and advised to contact facility within 48 hours to schedule appointment. Medications have been e-scribed to pharmacy of patients choice.      BEVERLY Ortega

## 2020-09-05 ASSESSMENT — ENCOUNTER SYMPTOMS
ABDOMINAL PAIN: 0
VISUAL CHANGE: 0
NAUSEA: 0
SWOLLEN GLANDS: 0
COUGH: 0
SORE THROAT: 0
VOMITING: 0
CHANGE IN BOWEL HABIT: 0

## 2020-09-08 ENCOUNTER — HOSPITAL ENCOUNTER (OUTPATIENT)
Dept: PHYSICAL THERAPY | Age: 35
Setting detail: THERAPIES SERIES
Discharge: HOME | End: 2020-09-08
Payer: MEDICAID

## 2020-09-08 PROCEDURE — 97161 PT EVAL LOW COMPLEX 20 MIN: CPT

## 2020-09-08 NOTE — CONSULTS
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, climb in,       ADL/IADL Previous level of function Current level of function Who currently assists the patient with task   Bathing  [x] Independent  [] Assist [x] Independent  [] Assist    Dress/grooming [x] Independent  [] Assist [x] Independent  [] Assist    Transfer/mobility [x] Independent  [] Assist [x] Independent  [] Assist    Feeding [x] Independent  [] Assist [x] Independent  [] Assist    Toileting [x] Independent  [] Assist [x] Independent  [] Assist    Driving [x] Independent  [] Assist [x] Independent  [] Assist    Housekeeping [x] Independent  [] Assist [x] Independent  [] Assist    Grocery shop/meal prep [x] Independent  [] Assist [x] Independent  [] Assist      Gait Prior level of function Current level of function    [x] Independent  [] Assist [x] Independent  [] Assist   Device: [x] Independent [x] Independent    [] Straight Cane [] Quad cane [] Straight Cane [] Quad cane    [] Standard walker [] Rolling walker   [] 4 wheeled walker [] Standard walker [] Rolling walker   [] 4 wheeled walker    [] Wheelchair [] Wheelchair     Pain:  [] Yes  [x] No Location: rt thumb numbness Pain Rating: (0-10 scale) 0/10  Pain altered Tx:  [] Yes  [] No  Action:    Symptoms:  [x] Improving [] Worsening [] Same  Better:  [] AM    [] PM    [] Sit    [] Rise/Sit    []Stand    [] Walk    [] Lying    [] Other:  Worse: [] AM    [] PM Gait Training   [id number] [] Ultrasound   [id number]   [] Neuromuscular Re-education  [id number] [] Electrical Stimulation Unattended  93602   [] Manual Therapy  84231 [] Electrical Stimulation Attended  [id number]   [] Instruction in HEP  [] Lumbar/Cervical Traction  [id number]   [] Aquatic Therapy   [id number] [] Cold/hotpack    [] Massage   [id number]      [] Dry Needling, 1 or 2 muscles  36249   [] Biofeedback, first 15 minutes   30426  [] Biofeedback, additional 15 minutes   42723 [] Dry Needling, 3 or more muscles  55452     []  Medication allergies reviewed for use of    Dexamethasone Sodium Phosphate 4mg/ml     with iontophoresis treatments. Pt is not allergic. Frequency:  NONE    Todays Treatment                                                                  NONE Needed  Modalities:   Exercises:  Exercise Reps/ Time Weight/ Level Comments                                 Other:           Evaluation Complexity:  History (Personal factors, comorbidities) [] 0 [x] 1-2 [] 3+   Exam (limitations, restrictions) [x] 0 [] 3 [] 4+   Clinical presentation (progression) [x] Stable [] Evolving  [] Unstable   Decision Making [x] Low [] Moderate [] High    [x] Low Complexity [] Moderate Complexity [] High Complexity       Treatment Charges: Mins Units   [x] Evaluation       [x]  Low       []  Moderate       []  High 30 1   []  Modalities     []  Ther Exercise     []  Manual Therapy     []  Ther Activities     []  Aquatics     []  Vasocompression     []  Other       TOTAL TREATMENT TIME: 30    He tests without deficits and feels he is doing all activities at home without difficulty, doing better and more every day. Does not require PT at this time.     Time in:1:50 pm     Time out: 2:40 pm    Electronically signed by: Petr Cotter PT        Physician Signature:________________________________Date:__________________  By signing above or cosigning this note, I have reviewed this plan of care and certify a need for medically necessary rehabilitation services.      *PLEASE SIGN ABOVE AND FAX BACK ALL PAGES*

## 2020-09-09 ENCOUNTER — TELEPHONE (OUTPATIENT)
Dept: INTERNAL MEDICINE | Age: 35
End: 2020-09-09

## 2020-09-09 ENCOUNTER — HOSPITAL ENCOUNTER (OUTPATIENT)
Age: 35
Setting detail: SPECIMEN
Discharge: HOME | End: 2020-09-09
Payer: MEDICAID

## 2020-09-09 LAB
-: NORMAL
ABSOLUTE EOS #: 0.32 K/UL (ref 0–0.44)
ABSOLUTE IMMATURE GRANULOCYTE: 0.06 K/UL (ref 0–0.3)
ABSOLUTE LYMPH #: 2.57 K/UL (ref 1.1–3.7)
ABSOLUTE MONO #: 0.67 K/UL (ref 0.1–1.2)
ALBUMIN SERPL-MCNC: 4.3 G/DL (ref 3.5–5.2)
ALBUMIN/GLOBULIN RATIO: 1.5 (ref 1–2.5)
ALP BLD-CCNC: 71 U/L (ref 40–129)
ALT SERPL-CCNC: 29 U/L (ref 5–41)
AMORPHOUS: NORMAL
ANION GAP SERPL CALCULATED.3IONS-SCNC: 16 MMOL/L (ref 9–17)
AST SERPL-CCNC: 25 U/L
BACTERIA: NORMAL
BASOPHILS # BLD: 1 % (ref 0–2)
BASOPHILS ABSOLUTE: 0.05 K/UL (ref 0–0.2)
BILIRUB SERPL-MCNC: 0.22 MG/DL (ref 0.3–1.2)
BILIRUBIN URINE: NEGATIVE
BUN BLDV-MCNC: 19 MG/DL (ref 6–20)
BUN/CREAT BLD: ABNORMAL (ref 9–20)
CALCIUM SERPL-MCNC: 9.2 MG/DL (ref 8.6–10.4)
CASTS UA: NORMAL /LPF (ref 0–8)
CHLORIDE BLD-SCNC: 100 MMOL/L (ref 98–107)
CHOLESTEROL/HDL RATIO: 3.2
CHOLESTEROL: 188 MG/DL
CO2: 22 MMOL/L (ref 20–31)
COLOR: YELLOW
COMMENT UA: ABNORMAL
CREAT SERPL-MCNC: 0.82 MG/DL (ref 0.7–1.2)
CRYSTALS, UA: NORMAL /HPF
DIFFERENTIAL TYPE: ABNORMAL
EOSINOPHILS RELATIVE PERCENT: 4 % (ref 1–4)
EPITHELIAL CELLS UA: NORMAL /HPF (ref 0–5)
GFR AFRICAN AMERICAN: >60 ML/MIN
GFR NON-AFRICAN AMERICAN: >60 ML/MIN
GFR SERPL CREATININE-BSD FRML MDRD: ABNORMAL ML/MIN/{1.73_M2}
GFR SERPL CREATININE-BSD FRML MDRD: ABNORMAL ML/MIN/{1.73_M2}
GLUCOSE BLD-MCNC: 112 MG/DL (ref 70–99)
GLUCOSE URINE: NEGATIVE
HCT VFR BLD CALC: 43.9 % (ref 40.7–50.3)
HDLC SERPL-MCNC: 59 MG/DL
HEMOGLOBIN: 14.2 G/DL (ref 13–17)
IMMATURE GRANULOCYTES: 1 %
KETONES, URINE: NEGATIVE
LDL CHOLESTEROL: 81 MG/DL (ref 0–130)
LEUKOCYTE ESTERASE, URINE: NEGATIVE
LYMPHOCYTES # BLD: 33 % (ref 24–43)
MCH RBC QN AUTO: 32.1 PG (ref 25.2–33.5)
MCHC RBC AUTO-ENTMCNC: 32.3 G/DL (ref 28.4–34.8)
MCV RBC AUTO: 99.3 FL (ref 82.6–102.9)
MONOCYTES # BLD: 9 % (ref 3–12)
MUCUS: NORMAL
NITRITE, URINE: NEGATIVE
NRBC AUTOMATED: 0 PER 100 WBC
OTHER OBSERVATIONS UA: NORMAL
PDW BLD-RTO: 13.3 % (ref 11.8–14.4)
PH UA: 5.5 (ref 5–8)
PLATELET # BLD: 474 K/UL (ref 138–453)
PLATELET ESTIMATE: ABNORMAL
PMV BLD AUTO: 10.7 FL (ref 8.1–13.5)
POTASSIUM SERPL-SCNC: 3.8 MMOL/L (ref 3.7–5.3)
PROTEIN UA: NEGATIVE
RBC # BLD: 4.42 M/UL (ref 4.21–5.77)
RBC # BLD: ABNORMAL 10*6/UL
RBC UA: NORMAL /HPF (ref 0–4)
RENAL EPITHELIAL, UA: NORMAL /HPF
SEG NEUTROPHILS: 52 % (ref 36–65)
SEGMENTED NEUTROPHILS ABSOLUTE COUNT: 4.08 K/UL (ref 1.5–8.1)
SODIUM BLD-SCNC: 138 MMOL/L (ref 135–144)
SPECIFIC GRAVITY UA: 1.02 (ref 1–1.03)
TOTAL PROTEIN: 7.2 G/DL (ref 6.4–8.3)
TRICHOMONAS: NORMAL
TRIGL SERPL-MCNC: 241 MG/DL
TSH SERPL DL<=0.05 MIU/L-ACNC: 1.28 MIU/L (ref 0.3–5)
TURBIDITY: CLEAR
URINE HGB: ABNORMAL
UROBILINOGEN, URINE: NORMAL
VLDLC SERPL CALC-MCNC: ABNORMAL MG/DL (ref 1–30)
WBC # BLD: 7.8 K/UL (ref 3.5–11.3)
WBC # BLD: ABNORMAL 10*3/UL
WBC UA: NORMAL /HPF (ref 0–5)
YEAST: NORMAL

## 2020-09-09 NOTE — TELEPHONE ENCOUNTER
Patient came into office requesting clearance to go back to work. States PT evaluation is in chart and that he is okay to go back to work but needs letter giving clearance.

## 2020-10-05 ENCOUNTER — TELEPHONE (OUTPATIENT)
Dept: UROLOGY | Age: 35
End: 2020-10-05

## 2021-04-29 ENCOUNTER — OFFICE VISIT (OUTPATIENT)
Dept: FAMILY MEDICINE CLINIC | Age: 36
End: 2021-04-29
Payer: MEDICAID

## 2021-04-29 VITALS
DIASTOLIC BLOOD PRESSURE: 94 MMHG | TEMPERATURE: 98.1 F | OXYGEN SATURATION: 99 % | HEIGHT: 69 IN | WEIGHT: 150 LBS | HEART RATE: 67 BPM | BODY MASS INDEX: 22.22 KG/M2 | SYSTOLIC BLOOD PRESSURE: 132 MMHG

## 2021-04-29 DIAGNOSIS — S32.009D CLOSED FRACTURE OF LUMBAR VERTEBRA WITH ROUTINE HEALING, UNSPECIFIED FRACTURE MORPHOLOGY, UNSPECIFIED LUMBAR VERTEBRAL LEVEL, SUBSEQUENT ENCOUNTER: ICD-10-CM
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
## 2021-04-29 PROCEDURE — 99213 OFFICE O/P EST LOW 20 MIN: CPT | Performed by: FAMILY MEDICINE

## 2021-04-29 PROCEDURE — G8420 CALC BMI NORM PARAMETERS: HCPCS | Performed by: FAMILY MEDICINE

## 2021-04-29 PROCEDURE — G8427 DOCREV CUR MEDS BY ELIG CLIN: HCPCS | Performed by: FAMILY MEDICINE

## 2021-04-29 PROCEDURE — 1036F TOBACCO NON-USER: CPT | Performed by: FAMILY MEDICINE

## 2021-04-29 ASSESSMENT — PATIENT HEALTH QUESTIONNAIRE - PHQ9
2. FEELING DOWN, DEPRESSED OR HOPELESS: 0
SUM OF ALL RESPONSES TO PHQ QUESTIONS 1-9: 0
SUM OF ALL RESPONSES TO PHQ QUESTIONS 1-9: 0
1. LITTLE INTEREST OR PLEASURE IN DOING THINGS: 0

## 2021-04-29 ASSESSMENT — ENCOUNTER SYMPTOMS
RESPIRATORY NEGATIVE: 1
ABDOMINAL PAIN: 0
BOWEL INCONTINENCE: 0
BACK PAIN: 0
GASTROINTESTINAL NEGATIVE: 1

## 2021-04-29 NOTE — PATIENT INSTRUCTIONS

## 2021-04-29 NOTE — LETTER
[facility]   [facility] [address] [facility] [address]  Phone: [phone]  Fax: [phone]    Tabatha Garcia MD        April 29, 2021     Patient: Vinay Grayson   YOB: 1985   Date of Visit: 4/29/2021       To Whom it May Concern:    Kate Narayan was seen in my clinic on 4/29/2021. He is able to work with no restrictions as long as he remains pain free. If you have any questions or concerns, please don't hesitate to call.     Sincerely,           Tabatha Garcia MD

## 2021-04-29 NOTE — PROGRESS NOTES
Onset    No Known Problems Mother     No Known Problems Father     No Known Problems Sister     No Known Problems Brother     Cancer Maternal Grandmother     No Known Problems Maternal Grandfather     Diabetes Paternal Grandmother     Diabetes Paternal Grandfather        Social History     Tobacco Use    Smoking status: Former Smoker     Packs/day: 0.50     Years: 12.00     Pack years: 6.00     Types: Cigarettes     Quit date: 2020     Years since quittin.7    Smokeless tobacco: Never Used   Substance Use Topics    Alcohol use: No      Current Outpatient Medications   Medication Sig Dispense Refill    venlafaxine (EFFEXOR XR) 75 MG extended release capsule Take 1 capsule by mouth daily (Patient not taking: Reported on 2021) 30 capsule 1     No current facility-administered medications for this visit. No Known Allergies    Health Maintenance   Topic Date Due    Hepatitis C screen  Never done    Hepatitis A vaccine (1 of 2 - Risk 2-dose series) Never done    COVID-19 Vaccine (1) Never done    Hepatitis B vaccine (1 of 3 - Risk 3-dose series) Never done    DTaP/Tdap/Td vaccine (1 - Tdap) 2025 (Originally 2002)    Flu vaccine (Season Ended) 2021    HIV screen  Completed    Hib vaccine  Aged Out    Meningococcal (ACWY) vaccine  Aged Out    Pneumococcal 0-64 years Vaccine  Aged Out    Varicella vaccine  Discontinued       :      Review of Systems   Constitutional: Negative for fever. HENT: Negative. Respiratory: Negative. Cardiovascular: Negative for chest pain. Gastrointestinal: Negative. Negative for abdominal pain and bowel incontinence. Genitourinary: Negative for bladder incontinence and pelvic pain. Musculoskeletal: Negative for arthralgias, back pain, gait problem, myalgias, neck pain and neck stiffness. Skin: Negative for pallor and rash. Neurological: Negative. Negative for tingling, weakness, numbness and headaches. Psychiatric/Behavioral: Negative. Objective:     Physical Exam  Vitals signs and nursing note reviewed. Constitutional:       Appearance: Normal appearance. He is normal weight. HENT:      Head: Normocephalic and atraumatic. Right Ear: Hearing normal.      Left Ear: Hearing normal.      Nose: Nose normal.      Mouth/Throat:      Lips: Pink. Eyes:      Extraocular Movements: Extraocular movements intact. Conjunctiva/sclera: Conjunctivae normal.   Neck:      Musculoskeletal: Normal range of motion. Cardiovascular:      Rate and Rhythm: Normal rate and regular rhythm. Heart sounds: Normal heart sounds. Pulmonary:      Effort: Pulmonary effort is normal.      Breath sounds: Normal breath sounds. Musculoskeletal: Normal range of motion. General: No swelling, tenderness, deformity or signs of injury. Comments: Patient has normal range of motion of back. No pain with flexion, extension, lateral bending or twisting. Skin:     General: Skin is warm and dry. Neurological:      Mental Status: He is alert and oriented to person, place, and time. Mental status is at baseline. Psychiatric:         Mood and Affect: Mood normal.         Behavior: Behavior normal.         Thought Content: Thought content normal.         Judgment: Judgment normal.       BP (!) 132/94 Comment: recheck  Pulse 67   Temp 98.1 °F (36.7 °C) (Infrared)   Ht 5' 9\" (1.753 m)   Wt 150 lb (68 kg)   SpO2 99%   BMI 22.15 kg/m²     Assessment:       Diagnosis Orders   1. Fall, initial encounter     2. Closed fracture of lumbar vertebra with routine healing, unspecified fracture morphology, unspecified lumbar vertebral level, subsequent encounter         Plan:    May take Tylenol or Motrin as needed for pain. May ice as needed for pain.   If you do develop any pain may consider back xray  If symptoms worsen or do not improve please follow-up with PCP or return to clinic    Patient is cleared to work with no restrictions as long as he remains pain-free    No orders of the defined types were placed in this encounter. No orders of the defined types were placed in this encounter. Patient given educational materials - see patient instructions. Discussed use, benefit, and side effects of prescribed medications. All patient questions answered. Pt voiced understanding. Patient agreed with treatment plan. Follow up as directed.      Electronicallysigned by David Wray MD on 4/29/2021 at 11:45 AM

## 2022-06-08 NOTE — ED PROVIDER NOTES
[facility] ED  Emergency Department Encounter  Emergency Medicine Resident     Pt Name: Saniya Monroe  MRN: [id number]  [place] 1985  Date ofevaluation: 6/24/19  PCP:  Rozina Erickson MD    [address] [place] [address]       Chief Complaint   Patient presents with    Hematuria     x 1 week     HISTORY OF PRESENT ILLNESS  (Location/Symptom, Timing/Onset, Context/Setting, Quality, Duration, Modifying Factors, Severity.)      Saniya Monroe is a 26 y.o. male who presents with complaint of intermittent hematuria for the last week w/ mild dysuria. Patient also reports mild penile discharge x 2 days. Denies fever, chills, nausea, vomiting, abdominal pain, back pain, rash. Denies any genital sores or lesions. Reports unprotected sexual intercourse as well as multiple partners. Denies hx of STDs. REVIEW OF SYSTEMS    (2-9 systems for level 4, 10 or more for level 5)      Review of Systems   Constitutional: Negative for chills and fever. HENT: Negative for sore throat. Respiratory: Negative for shortness of breath. Cardiovascular: Negative for chest pain. Gastrointestinal: Negative for abdominal pain, nausea and vomiting. Genitourinary: Positive for discharge, dysuria and hematuria. Musculoskeletal: Negative for gait problem. Skin: Negative for rash. Neurological: Negative for headaches. Psychiatric/Behavioral: Negative for confusion. PAST MEDICAL / SURGICAL /SOCIAL / FAMILY HISTORY      has a past medical history of Bronchitis, Difficult intubation, Heartburn, Inguinal hernia, and Snores. has a past surgical history that includes Tonsillectomy; Inguinal hernia repair (Left, 04/13/2018); and pr lap,inguinal hernia repr,initial (Left, 4/13/2018).     Social History     Socioeconomic History    Marital status: Single     Spouse name: Not on file    Number of children: Not on file    Years of education: Not on file    Highest education level: Not on file   Occupational History    Not on file   Social Needs    Financial resource strain: Not on file    Food insecurity:     Worry: Not on file     Inability: Not on file    Transportation needs:     Medical: Not on file     Non-medical: Not on file   Tobacco Use    Smoking status: Current Every Day Smoker     Packs/day: 0.50     Years: 20.00     Pack years: 10.00     Types: Cigarettes    Smokeless tobacco: Never Used    Tobacco comment: trying to quit   Substance and Sexual Activity    Alcohol use: No     Comment: monthly    Drug use: Yes     Types: Marijuana     Comment: last [facility] 4/10/2018    Sexual activity: Yes     Partners: Female     Birth control/protection: Condom   Lifestyle    Physical activity:     Days per week: Not on file     Minutes per session: Not on file    Stress: Not on file   Relationships    Social connections:     Talks on phone: Not on file     Gets together: Not on file     Attends Nondenominational service: Not on file     Active member of club or organization: Not on file     Attends meetings of clubs or organizations: Not on file     Relationship status: Not on file    Intimate partner violence:     Fear of current or ex partner: Not on file     Emotionally abused: Not on file     Physically abused: Not on file     Forced sexual activity: Not on file   Other Topics Concern    Not on file   Social History Narrative    Not on file     Family History   Problem Relation Age of Onset    No Known Problems Mother     No Known Problems Father     No Known Problems Sister     No Known Problems Brother     Cancer Maternal Grandmother     No Known Problems Maternal Grandfather     Diabetes Paternal Grandmother     Diabetes Paternal Grandfather      Portions of the past medical history, past surgical history, social history, and family history were discussed and reviewed with thepatient/family and is included in HPI if pertinent.     ALLERGIES / IMMUNIZATIONS / HOME MEDICATIONS     Allergies:  Patient has no known Statement Selected allergies. IMMUNIZATIONS      There is no immunization history on file for this patient. Home Medications:  Prior to Admission medications    Medication Sig Start Date End Date Taking? Authorizing Provider   cephALEXin (KEFLEX) 500 MG capsule Take 1 capsule by mouth 4 times daily for 7 days 6/24/19 7/1/19 Yes Keyanna Swanson MD   docusate sodium (COLACE) 100 MG capsule Take 1 capsule by mouth 2 times daily as needed for Constipation (use while taking narcotic pain meds) 4/13/18   Star Justice, DO     PHYSICAL EXAM   (up to 7 for level 4, 8 or more for level 5)      INITIAL VITALS:    oral temperature is 98.4 °F (36.9 °C). His blood pressure is 123/83 and his pulse is 90. His respiration is 16 and oxygen saturation is 98%. Physical Exam   Constitutional: He is oriented to person, place, and time. He appears well-developed and well-nourished. No distress. HENT:   Head: Normocephalic and atraumatic. Neck: Normal range of motion. Neck supple. Cardiovascular: Normal rate, regular rhythm and normal heart sounds. Pulmonary/Chest: Effort normal and breath sounds normal.   Abdominal: Soft. There is no tenderness. Genitourinary:   Genitourinary Comments: No penile or scrotal lesions noted, no edema or tenderness, no discharge   Musculoskeletal: He exhibits no deformity. Neurological: He is alert and oriented to person, place, and time. Skin: Skin is warm and dry. Capillary refill takes less than 2 seconds. Psychiatric: He has a normal mood and affect. His behavior is normal.   Vitals reviewed.     Vitals:    Vitals:    06/24/19 0453   BP: 123/83   Pulse: 90   Resp: 16   Temp: 98.4 °F (36.9 °C)   TempSrc: Oral   SpO2: 98%     DIFFERENTIAL  DIAGNOSIS     PLAN (LABS / IMAGING / EKG):  Orders Placed This Encounter   Procedures    Wet prep, genital    C.trachomatis N.gonorrhoeae DNA, Urine    Urine Culture    Urinalysis Reflex to Culture    HIV Screen    T. pallidum Ab    Microscopic Urinalysis  Protein, Urine    Protein, Urine     Plan of care is reviewed and discussed with the family/ patient when able. Family/ Patient consents to treatment and plan if able to do so. MEDICATIONS ORDERED:  Orders Placed This Encounter   Medications    cefTRIAXone (ROCEPHIN) injection 250 mg    azithromycin (ZITHROMAX) tablet 1,000 mg    cephALEXin (KEFLEX) 500 MG capsule     Sig: Take 1 capsule by mouth 4 times daily for 7 days     Dispense:  28 capsule     Refill:  0     DIAGNOSTIC RESULTS     LABS:  Results for orders placed or performed during the hospital encounter of 06/24/19   Wet prep, genital   Result Value Ref Range    Specimen Description . PENIS     Special Requests NOT REPORTED     Direct Exam NO TRICHOMONAS SEEN    C.trachomatis N.gonorrhoeae DNA, Urine   Result Value Ref Range    Specimen Description . URINE     C. trachomatis DNA ,Urine (A) NEGATIVE     POSITIVE: CHLAMYDIA TRACHOMATIS DNA detected by nucleic acid amplification. N. gonorrhoeae DNA, Urine (A) NEGATIVE     POSITIVE: NEISSERIA GONORRHOEAE DNA detected by nucleic acid amplification. Urine Culture   Result Value Ref Range    Specimen Description . Random Urine     Special Requests NOT REPORTED     Culture NO SIGNIFICANT GROWTH    Urinalysis Reflex to Culture   Result Value Ref Range    Color, UA YELLOW YELLOW    Turbidity UA CLOUDY (A) CLEAR    Glucose, Ur NEGATIVE NEGATIVE    Bilirubin Urine NEGATIVE NEGATIVE    Ketones, Urine NEGATIVE NEGATIVE    Specific Gravity, UA 1.028 1.005 - 1.030    Urine Hgb NEGATIVE NEGATIVE    pH, UA 8.0 5.0 - 8.0    Protein, UA NEGATIVE  Verified by sulfosalicylic acid test. (A) NEGATIVE    Urobilinogen, Urine Normal Normal    Nitrite, Urine NEGATIVE NEGATIVE    Leukocyte Esterase, Urine MODERATE (A) NEGATIVE    Urinalysis Comments NOT REPORTED    HIV Screen   Result Value Ref Range    HIV Ag/Ab NONREACTIVE NONREACTIVE   T. pallidum Ab   Result Value Ref Range    T. pallidum, IgG NONREACTIVE NONREACTIVE Microscopic Urinalysis   Result Value Ref Range    -          WBC, UA TOO NUMEROUS TO COUNT 0 - 5 /HPF    RBC, UA 2 TO 5 0 - 4 /HPF    Casts UA  0 - 8 /LPF     0 TO 2 HYALINE Reference range defined for non-centrifuged specimen. Crystals UA NOT REPORTED None /HPF    Epithelial Cells UA None 0 - 5 /HPF    Renal Epithelial, Urine NOT REPORTED 0 /HPF    Bacteria, UA NOT REPORTED None    Mucus, UA NOT REPORTED None    Trichomonas, UA NOT REPORTED None    Amorphous, UA NOT REPORTED None    Other Observations UA NOT REPORTED NOT REQ. Yeast, UA NOT REPORTED None     Labs Reviewed   C.TRACHOMATIS N.GONORRHOEAE DNA, URINE - Abnormal; Notable for the following components:       Result Value    C. trachomatis DNA ,Urine   (*)     Value: POSITIVE: CHLAMYDIA TRACHOMATIS DNA detected by nucleic acid amplification. N. gonorrhoeae DNA, Urine   (*)     Value: POSITIVE: NEISSERIA GONORRHOEAE DNA detected by nucleic acid amplification. All other components within normal limits   URINE RT REFLEX TO CULTURE - Abnormal; Notable for the following components:    Turbidity UA CLOUDY (*)     Protein, UA NEGATIVE  Verified by sulfosalicylic acid test. (*)     Leukocyte Esterase, Urine MODERATE (*)     All other components within normal limits   WET PREP, GENITAL   URINE CULTURE   HIV SCREEN   T. PALLIDUM AB   MICROSCOPIC URINALYSIS   PROTEIN, URINE   PROTEIN, URINE     ED BEDSIDE ULTRASOUND:   Not indicated    EMERGENCY DEPARTMENT COURSE / MDM   69-year-old male here with complaint of hematuria, dysuria, penile discharge. Patient reports history of unprotected intercourse with multiple partners. No other symptoms reported otherwise. Vitals unremarkable. On evaluation patient is comfortable appearing, no distress, abdomen is soft nontender,  exam is unremarkable with no lesions, no edema or tenderness, no penile discharge.   Will send for urinalysis, trichomonas, GC, will check for HIV and syphilis, will treat with Rocephin and azithromycin for possible STD. Patient is agreeable with plan. Labs pending. Patient care signed out to Dr. Krista Sarabia:  Procedures    CONSULTS:  None    CRITICAL CARE:  See attending note    FINAL IMPRESSION      1. Urethritis        DISPOSITION / PLAN     DISPOSITION      If the patient was admitted, some of the above orders,medications, labs, and consults may have been placed by the admitting team(s) and were auto populated above when I refreshed my note prior to signing it. If there is any question, please check for the responsible providerfor individual orders in the EHR system. If discharged, the patient was instructed to return to the emergency department with any worsening symptoms, if new symptoms arise, or if they have any other concerns. Patient was instructed not to drive home if discharged today and received pain medications or other mind-altering medications while here. Pre-hypertension/Hypertension: In the case that there was an elevated blood pressure reading for this patient today in the emergency department, the patient was informed thathe or she may have pre-hypertension or hypertension. It was recommended to the patient to call the primary care provider listed in the discharge instructions or a physician of the patient's choice this week to arrange followup for further evaluation of possible pre-hypertension or hypertension.      PATIENT REFERRED TO:  Niesha Smith MD  [address] [place] [address]  [phone]    Schedule an appointment as soon as possible for a visit       [facility] ED  [address] [address]  [phone]    As needed, If symptoms worsen      DISCHARGE MEDICATIONS:  Discharge Medication List as of 6/24/2019  9:15 AM      START taking these medications    Details   cephALEXin (KEFLEX) 500 MG capsule Take 1 capsule by mouth 4 times daily for 7 days, Disp-28 capsule, R-0Print           Mary Beth Foster, DO  Emergency Medicine Resident    (Please note that portions of this note were completed with a voice recognition program.  Curt Anna made to edit the dictations but occasionally words are mis-transcribed.)      Meet Rao DO  06/25/19 1197

## (undated) DEVICE — BLADELESS OBTURATOR: Brand: WECK VISTA

## (undated) DEVICE — SUTURE MCRYL SZ 4-0 L18IN ABSRB UD L16MM PC-3 3/8 CIR PRIM Y845G

## (undated) DEVICE — ARM DRAPE

## (undated) DEVICE — CANNULA IV 18GA L15IN BLNT FILL LUERLOCK HUB MJCT

## (undated) DEVICE — GARMENT,MEDLINE,DVT,INT,CALF,MED, GEN2: Brand: MEDLINE

## (undated) DEVICE — 35 ML SYRINGE LUER-LOCK TIP: Brand: MONOJECT

## (undated) DEVICE — TRI-LUMEN FILTERED TUBE SET WITH ACTIVATED CHARCOAL FILTER: Brand: AIRSEAL

## (undated) DEVICE — NEEDLE SPNL 18GA L3.5IN W/ QNCKE SHARPER BVL DURA CLICK

## (undated) DEVICE — GLOVE ORANGE PI 7   MSG9070

## (undated) DEVICE — MITT PREP W575XL775IN POVIDONE IOD HAIR REMV

## (undated) DEVICE — GEL US 20GM NONIRRITATING OVERWRAPPED FILE PCH TRNSMIT

## (undated) DEVICE — CANNULA SEAL

## (undated) DEVICE — BLADE CLIPPER GEN PURP NS

## (undated) DEVICE — TIP COVER ACCESSORY

## (undated) DEVICE — GLOVE ORANGE PI 7 1/2   MSG9075

## (undated) DEVICE — CHLORAPREP 26ML ORANGE

## (undated) DEVICE — SUTURE DEV SZ 2-0 WND CLSR ABSRB GS-22 VLOC COVIDIEN VLOCM2145

## (undated) DEVICE — Device

## (undated) DEVICE — GLOVE SURG SZ 65 THK91MIL LTX FREE SYN POLYISOPRENE

## (undated) DEVICE — NEEDLE ECHOGENIC 20GA L4IN INSUL W/ 30DEG BVL EXTN SET

## (undated) DEVICE — DVD-R MAXWELL ROBOTIC SURGERY

## (undated) DEVICE — DEVICE TRCR 12X9X3IN WHT CLSR DISP OMNICLOSE

## (undated) DEVICE — TOWEL,OR,DSP,ST,NATURAL,DLX,4/PK,20PK/CS: Brand: MEDLINE

## (undated) DEVICE — Z DISCONTINUED USE 2423037 APPLICATOR MEDICATED 3 CC CHLORHEXIDINE GLUC 2% CHLORAPREP

## (undated) DEVICE — STANDARD HYPODERMIC NEEDLE,POLYPROPYLENE HUB: Brand: MONOJECT

## (undated) DEVICE — COVER,LIGHT HANDLE,FLX,2/PK: Brand: MEDLINE INDUSTRIES, INC.

## (undated) DEVICE — ADHESIVE SKIN CLSR 0.7ML TOP DERMBND ADV

## (undated) DEVICE — INSUFFLATION NEEDLE TO ESTABLISH PNEUMOPERITONEUM.: Brand: INSUFFLATION NEEDLE

## (undated) DEVICE — AIRSEAL 8 MM ACCESS PORT AND LOW PROFILE OBTURATOR WITH BLADELESS OPTICAL TIP, 120 MM LENGTH: Brand: AIRSEAL

## (undated) DEVICE — KENDALL SCD EXPRESS SLEEVES, KNEE LENGTH, MEDIUM: Brand: KENDALL SCD